# Patient Record
Sex: FEMALE | Race: WHITE | NOT HISPANIC OR LATINO | Employment: FULL TIME | URBAN - METROPOLITAN AREA
[De-identification: names, ages, dates, MRNs, and addresses within clinical notes are randomized per-mention and may not be internally consistent; named-entity substitution may affect disease eponyms.]

---

## 2023-04-25 LAB
ALANINE AMINOTRANSFERASE (SGPT) (U/L) IN SER/PLAS: 19 U/L (ref 7–45)
ALBUMIN (G/DL) IN SER/PLAS: 3.7 G/DL (ref 3.4–5)
ALKALINE PHOSPHATASE (U/L) IN SER/PLAS: 58 U/L (ref 33–136)
ANION GAP IN SER/PLAS: 17 MMOL/L (ref 10–20)
ASPARTATE AMINOTRANSFERASE (SGOT) (U/L) IN SER/PLAS: 19 U/L (ref 9–39)
BILIRUBIN TOTAL (MG/DL) IN SER/PLAS: 0.3 MG/DL (ref 0–1.2)
C REACTIVE PROTEIN (MG/L) IN SER/PLAS: 0.78 MG/DL
CALCIUM (MG/DL) IN SER/PLAS: 9.3 MG/DL (ref 8.6–10.3)
CARBON DIOXIDE, TOTAL (MMOL/L) IN SER/PLAS: 21 MMOL/L (ref 21–32)
CHLORIDE (MMOL/L) IN SER/PLAS: 104 MMOL/L (ref 98–107)
CREATININE (MG/DL) IN SER/PLAS: 0.61 MG/DL (ref 0.5–1.05)
ERYTHROCYTE DISTRIBUTION WIDTH (RATIO) BY AUTOMATED COUNT: 12.4 % (ref 11.5–14.5)
ERYTHROCYTE MEAN CORPUSCULAR HEMOGLOBIN CONCENTRATION (G/DL) BY AUTOMATED: 31.8 G/DL (ref 32–36)
ERYTHROCYTE MEAN CORPUSCULAR VOLUME (FL) BY AUTOMATED COUNT: 90 FL (ref 80–100)
ERYTHROCYTES (10*6/UL) IN BLOOD BY AUTOMATED COUNT: 4.48 X10E12/L (ref 4–5.2)
GFR FEMALE: >90 ML/MIN/1.73M2
GLUCOSE (MG/DL) IN SER/PLAS: 70 MG/DL (ref 74–99)
HEMATOCRIT (%) IN BLOOD BY AUTOMATED COUNT: 40.3 % (ref 36–46)
HEMOGLOBIN (G/DL) IN BLOOD: 12.8 G/DL (ref 12–16)
LEUKOCYTES (10*3/UL) IN BLOOD BY AUTOMATED COUNT: 5.3 X10E9/L (ref 4.4–11.3)
PLATELETS (10*3/UL) IN BLOOD AUTOMATED COUNT: 231 X10E9/L (ref 150–450)
POTASSIUM (MMOL/L) IN SER/PLAS: 4.5 MMOL/L (ref 3.5–5.3)
PROTEIN TOTAL: 6.4 G/DL (ref 6.4–8.2)
SEDIMENTATION RATE, ERYTHROCYTE: 44 MM/H (ref 0–30)
SODIUM (MMOL/L) IN SER/PLAS: 137 MMOL/L (ref 136–145)
UREA NITROGEN (MG/DL) IN SER/PLAS: 20 MG/DL (ref 6–23)

## 2023-11-02 DIAGNOSIS — I10 ESSENTIAL HYPERTENSION: ICD-10-CM

## 2023-11-02 RX ORDER — METOPROLOL SUCCINATE 50 MG/1
50 TABLET, EXTENDED RELEASE ORAL 2 TIMES DAILY
COMMUNITY
End: 2023-11-02 | Stop reason: SDUPTHER

## 2023-11-03 RX ORDER — METOPROLOL SUCCINATE 50 MG/1
50 TABLET, EXTENDED RELEASE ORAL 2 TIMES DAILY
Qty: 180 TABLET | Refills: 3 | Status: SHIPPED | OUTPATIENT
Start: 2023-11-03 | End: 2024-01-29 | Stop reason: WASHOUT

## 2023-12-05 ENCOUNTER — OFFICE VISIT (OUTPATIENT)
Dept: RHEUMATOLOGY | Facility: CLINIC | Age: 63
End: 2023-12-05
Payer: COMMERCIAL

## 2023-12-05 VITALS
DIASTOLIC BLOOD PRESSURE: 60 MMHG | WEIGHT: 102 LBS | SYSTOLIC BLOOD PRESSURE: 101 MMHG | HEIGHT: 66 IN | BODY MASS INDEX: 16.39 KG/M2

## 2023-12-05 DIAGNOSIS — M06.9 RHEUMATOID ARTHRITIS, INVOLVING UNSPECIFIED SITE, UNSPECIFIED WHETHER RHEUMATOID FACTOR PRESENT (MULTI): Primary | ICD-10-CM

## 2023-12-05 DIAGNOSIS — Z79.899 ENCOUNTER FOR LONG-TERM (CURRENT) USE OF HIGH-RISK MEDICATION: ICD-10-CM

## 2023-12-05 PROCEDURE — 99214 OFFICE O/P EST MOD 30 MIN: CPT | Performed by: INTERNAL MEDICINE

## 2023-12-05 PROCEDURE — 3078F DIAST BP <80 MM HG: CPT | Performed by: INTERNAL MEDICINE

## 2023-12-05 PROCEDURE — 1036F TOBACCO NON-USER: CPT | Performed by: INTERNAL MEDICINE

## 2023-12-05 PROCEDURE — 3074F SYST BP LT 130 MM HG: CPT | Performed by: INTERNAL MEDICINE

## 2023-12-05 RX ORDER — ABATACEPT 125 MG/ML
125 INJECTION, SOLUTION SUBCUTANEOUS
COMMUNITY
End: 2024-01-12 | Stop reason: HOSPADM

## 2023-12-05 RX ORDER — ACETAMINOPHEN, ASPIRIN (NSAID), AND CAFFEINE 250; 250; 65 MG/1; MG/1; MG/1
2 TABLET, FILM COATED ORAL EVERY 6 HOURS PRN
COMMUNITY

## 2023-12-05 ASSESSMENT — ENCOUNTER SYMPTOMS
PALPITATIONS: 1
DIZZINESS: 1
ROS GI COMMENTS: COLOSTOMY
SHORTNESS OF BREATH: 0
ABDOMINAL PAIN: 0

## 2023-12-05 NOTE — PROGRESS NOTES
Subjective   Patient ID: Maggie Carrington is a 63 y.o. female who presents for Rheumatoid Arthritis (Follow up ).  HPI  Patient with history of rheumatoid arthritis with rheumatoid nodules.  Previous medications have included methotrexate, gold, leflunomide, Xeljanz.  Patient with history of diverticulosis with history of colostomy and cardiac arrest.  Has had frequent infections on medication such as Xeljanz    At her last visit on 4/25/2023, we had her continue with Orencia injections.  Unfortunately she said she had difficulty with the co-pay assistance and they are charging her full price.  She does restarted again about 2 weeks ago and had been off for about 3 months.  Everything has been hurting but especially her right knee and hip.  She has been following with [facility] orth[facility] and did have injections in her right knee and hip with steroid and hyaluronic acid.    Denies any falls.  Denies any fractures.    She said her stress test was indicative of CAD.  Continues to be on metoprolol and gets occasional palpitations.  Gets occasional dizziness.    If she uses her walker too much her hands and shoulders will hurt her.  She is unable to ambulate without it.  Review of Systems   Respiratory:  Negative for shortness of breath.    Cardiovascular:  Positive for palpitations. Negative for leg swelling.   Gastrointestinal:  Negative for abdominal pain.        Colostomy   Neurological:  Positive for dizziness.       Objective   Physical Exam  GEN: NAD A&O x3 appropriate affect examined in her chair.  Uses a wheeled walker.  HENT: no enlarged glands or thyroid  EYES: +conjunctival redness, eyelids normal  SKIN: No rashes seen on exposed skin  MSK:  Unable to extend her fingers has had chronic changes bilaterally and surgical procedures in her MCPs.  Has swelling in the left breast more than the right no swelling elbows or shoulders decreased muscle tone in the upper extremities has swelling in the knees more on the  right than the left and pain with range of motion especially of the right hip.    Assessment/Plan        Rheumatoid arthritis with nodules.  Previously has been on methotrexate, gold, NSAIDs, leflunomide, Enbrel, Xeljanz.    -Currently on Orencia but has just been on for the last few weeks due to co-pay assistance problems.  Told her to call us if she has any issues in the future.  We will have her do blood work.  She does have some active swelling in her wrist today and also pain in the right knee and hip which most likely is from her rheumatoid.  Has been seeing [facility] and has had steroid and hyaluronic acid injections.      We will have her come back again in 6 months or as needed.    Patient is asking for prescription for wheelchair.

## 2023-12-25 DIAGNOSIS — M06.9 RHEUMATOID ARTHRITIS, INVOLVING UNSPECIFIED SITE, UNSPECIFIED WHETHER RHEUMATOID FACTOR PRESENT (MULTI): Primary | ICD-10-CM

## 2023-12-25 RX ORDER — ABATACEPT 125 MG/ML
125 INJECTION, SOLUTION SUBCUTANEOUS
Qty: 4 ML | Refills: 5 | Status: SHIPPED | OUTPATIENT
Start: 2023-12-25 | End: 2024-06-22

## 2024-01-05 ENCOUNTER — HOSPITAL ENCOUNTER (INPATIENT)
Facility: HOSPITAL | Age: 64
LOS: 3 days | Discharge: ANOTHER HOSPITAL | DRG: 281 | End: 2024-01-10
Attending: PHYSICIAN ASSISTANT | Admitting: INTERNAL MEDICINE
Payer: COMMERCIAL

## 2024-01-05 ENCOUNTER — APPOINTMENT (OUTPATIENT)
Dept: CARDIOLOGY | Facility: HOSPITAL | Age: 64
DRG: 281 | End: 2024-01-05
Payer: COMMERCIAL

## 2024-01-05 ENCOUNTER — HOSPITAL ENCOUNTER (INPATIENT)
Facility: HOSPITAL | Age: 64
End: 2024-01-05
Attending: FAMILY MEDICINE | Admitting: FAMILY MEDICINE
Payer: COMMERCIAL

## 2024-01-05 ENCOUNTER — APPOINTMENT (OUTPATIENT)
Dept: RADIOLOGY | Facility: HOSPITAL | Age: 64
DRG: 281 | End: 2024-01-05
Payer: COMMERCIAL

## 2024-01-05 DIAGNOSIS — I21.4 NSTEMI (NON-ST ELEVATED MYOCARDIAL INFARCTION) (MULTI): Primary | ICD-10-CM

## 2024-01-05 DIAGNOSIS — K04.7 DENTAL ABSCESS: ICD-10-CM

## 2024-01-05 DIAGNOSIS — R55 SYNCOPE AND COLLAPSE: ICD-10-CM

## 2024-01-05 LAB
ALBUMIN SERPL BCP-MCNC: 3.4 G/DL (ref 3.4–5)
ALP SERPL-CCNC: 58 U/L (ref 33–136)
ALT SERPL W P-5'-P-CCNC: 16 U/L (ref 7–45)
ANION GAP SERPL CALC-SCNC: 18 MMOL/L (ref 10–20)
ANION GAP SERPL CALC-SCNC: 19 MMOL/L (ref 10–20)
APTT PPP: 27 SECONDS (ref 27–38)
AST SERPL W P-5'-P-CCNC: 22 U/L (ref 9–39)
BILIRUB SERPL-MCNC: 0.4 MG/DL (ref 0–1.2)
BUN SERPL-MCNC: 11 MG/DL (ref 6–23)
BUN SERPL-MCNC: 11 MG/DL (ref 6–23)
CALCIUM SERPL-MCNC: 8.4 MG/DL (ref 8.6–10.3)
CALCIUM SERPL-MCNC: 8.4 MG/DL (ref 8.6–10.3)
CARDIAC TROPONIN I PNL SERPL HS: 127 NG/L (ref 0–13)
CARDIAC TROPONIN I PNL SERPL HS: 170 NG/L (ref 0–13)
CARDIAC TROPONIN I PNL SERPL HS: 94 NG/L (ref 0–13)
CHLORIDE SERPL-SCNC: 105 MMOL/L (ref 98–107)
CHLORIDE SERPL-SCNC: 105 MMOL/L (ref 98–107)
CO2 SERPL-SCNC: 20 MMOL/L (ref 21–32)
CO2 SERPL-SCNC: 21 MMOL/L (ref 21–32)
CREAT SERPL-MCNC: 0.61 MG/DL (ref 0.5–1.05)
CREAT SERPL-MCNC: 0.63 MG/DL (ref 0.5–1.05)
ERYTHROCYTE [DISTWIDTH] IN BLOOD BY AUTOMATED COUNT: 12.4 % (ref 11.5–14.5)
FLUAV RNA RESP QL NAA+PROBE: NOT DETECTED
FLUBV RNA RESP QL NAA+PROBE: NOT DETECTED
GFR SERPL CREATININE-BSD FRML MDRD: >90 ML/MIN/1.73M*2
GFR SERPL CREATININE-BSD FRML MDRD: >90 ML/MIN/1.73M*2
GLUCOSE SERPL-MCNC: 106 MG/DL (ref 74–99)
GLUCOSE SERPL-MCNC: 108 MG/DL (ref 74–99)
HCT VFR BLD AUTO: 39.1 % (ref 36–46)
HGB BLD-MCNC: 11.7 G/DL (ref 12–16)
LACTATE SERPL-SCNC: 1.8 MMOL/L (ref 0.4–2)
MAGNESIUM SERPL-MCNC: 1.51 MG/DL (ref 1.6–2.4)
MCH RBC QN AUTO: 27.2 PG (ref 26–34)
MCHC RBC AUTO-ENTMCNC: 29.9 G/DL (ref 32–36)
MCV RBC AUTO: 91 FL (ref 80–100)
NRBC BLD-RTO: 0 /100 WBCS (ref 0–0)
PLATELET # BLD AUTO: 171 X10*3/UL (ref 150–450)
POTASSIUM SERPL-SCNC: 3.5 MMOL/L (ref 3.5–5.3)
POTASSIUM SERPL-SCNC: 3.5 MMOL/L (ref 3.5–5.3)
PROT SERPL-MCNC: 6.4 G/DL (ref 6.4–8.2)
RBC # BLD AUTO: 4.3 X10*6/UL (ref 4–5.2)
SARS-COV-2 RNA RESP QL NAA+PROBE: NOT DETECTED
SODIUM SERPL-SCNC: 140 MMOL/L (ref 136–145)
SODIUM SERPL-SCNC: 140 MMOL/L (ref 136–145)
UFH PPP CHRO-ACNC: 0.7 IU/ML
WBC # BLD AUTO: 4.3 X10*3/UL (ref 4.4–11.3)

## 2024-01-05 PROCEDURE — 87636 SARSCOV2 & INF A&B AMP PRB: CPT | Performed by: PHYSICIAN ASSISTANT

## 2024-01-05 PROCEDURE — 93005 ELECTROCARDIOGRAM TRACING: CPT

## 2024-01-05 PROCEDURE — 70450 CT HEAD/BRAIN W/O DYE: CPT

## 2024-01-05 PROCEDURE — 36415 COLL VENOUS BLD VENIPUNCTURE: CPT | Performed by: PHYSICIAN ASSISTANT

## 2024-01-05 PROCEDURE — 73030 X-RAY EXAM OF SHOULDER: CPT | Mod: LT

## 2024-01-05 PROCEDURE — 70486 CT MAXILLOFACIAL W/O DYE: CPT | Performed by: RADIOLOGY

## 2024-01-05 PROCEDURE — 76377 3D RENDER W/INTRP POSTPROCES: CPT | Mod: IPSPLIT

## 2024-01-05 PROCEDURE — 72125 CT NECK SPINE W/O DYE: CPT

## 2024-01-05 PROCEDURE — 99285 EMERGENCY DEPT VISIT HI MDM: CPT | Performed by: PHYSICIAN ASSISTANT

## 2024-01-05 PROCEDURE — 2500000001 HC RX 250 WO HCPCS SELF ADMINISTERED DRUGS (ALT 637 FOR MEDICARE OP): Performed by: PHYSICIAN ASSISTANT

## 2024-01-05 PROCEDURE — 80053 COMPREHEN METABOLIC PANEL: CPT | Performed by: PHYSICIAN ASSISTANT

## 2024-01-05 PROCEDURE — 84484 ASSAY OF TROPONIN QUANT: CPT | Performed by: PHYSICIAN ASSISTANT

## 2024-01-05 PROCEDURE — 85730 THROMBOPLASTIN TIME PARTIAL: CPT | Performed by: PHYSICIAN ASSISTANT

## 2024-01-05 PROCEDURE — 71045 X-RAY EXAM CHEST 1 VIEW: CPT | Performed by: STUDENT IN AN ORGANIZED HEALTH CARE EDUCATION/TRAINING PROGRAM

## 2024-01-05 PROCEDURE — 72125 CT NECK SPINE W/O DYE: CPT | Performed by: RADIOLOGY

## 2024-01-05 PROCEDURE — 71045 X-RAY EXAM CHEST 1 VIEW: CPT

## 2024-01-05 PROCEDURE — 70486 CT MAXILLOFACIAL W/O DYE: CPT

## 2024-01-05 PROCEDURE — 85027 COMPLETE CBC AUTOMATED: CPT | Performed by: PHYSICIAN ASSISTANT

## 2024-01-05 PROCEDURE — 70450 CT HEAD/BRAIN W/O DYE: CPT | Performed by: RADIOLOGY

## 2024-01-05 PROCEDURE — 83605 ASSAY OF LACTIC ACID: CPT | Performed by: PHYSICIAN ASSISTANT

## 2024-01-05 PROCEDURE — 2500000004 HC RX 250 GENERAL PHARMACY W/ HCPCS (ALT 636 FOR OP/ED): Performed by: PHYSICIAN ASSISTANT

## 2024-01-05 PROCEDURE — 73030 X-RAY EXAM OF SHOULDER: CPT | Mod: LEFT SIDE | Performed by: RADIOLOGY

## 2024-01-05 PROCEDURE — 76377 3D RENDER W/INTRP POSTPROCES: CPT | Performed by: RADIOLOGY

## 2024-01-05 PROCEDURE — 80048 BASIC METABOLIC PNL TOTAL CA: CPT | Mod: CCI | Performed by: PHYSICIAN ASSISTANT

## 2024-01-05 PROCEDURE — 85520 HEPARIN ASSAY: CPT | Performed by: PHYSICIAN ASSISTANT

## 2024-01-05 PROCEDURE — 83735 ASSAY OF MAGNESIUM: CPT | Performed by: PHYSICIAN ASSISTANT

## 2024-01-05 RX ORDER — ACETAMINOPHEN 325 MG/1
650 TABLET ORAL ONCE
Status: COMPLETED | OUTPATIENT
Start: 2024-01-05 | End: 2024-01-05

## 2024-01-05 RX ORDER — HEPARIN SODIUM 5000 [USP'U]/ML
60 INJECTION, SOLUTION INTRAVENOUS; SUBCUTANEOUS ONCE
Status: COMPLETED | OUTPATIENT
Start: 2024-01-05 | End: 2024-01-05

## 2024-01-05 RX ORDER — LANOLIN ALCOHOL/MO/W.PET/CERES
400 CREAM (GRAM) TOPICAL ONCE
Status: COMPLETED | OUTPATIENT
Start: 2024-01-05 | End: 2024-01-05

## 2024-01-05 RX ORDER — CEFTRIAXONE 1 G/50ML
1 INJECTION, SOLUTION INTRAVENOUS ONCE
Status: COMPLETED | OUTPATIENT
Start: 2024-01-05 | End: 2024-01-05

## 2024-01-05 RX ORDER — NAPROXEN SODIUM 220 MG/1
81 TABLET, FILM COATED ORAL ONCE
Status: COMPLETED | OUTPATIENT
Start: 2024-01-05 | End: 2024-01-05

## 2024-01-05 RX ORDER — HEPARIN SODIUM 5000 [USP'U]/ML
INJECTION, SOLUTION INTRAVENOUS; SUBCUTANEOUS EVERY 4 HOURS PRN
Status: DISCONTINUED | OUTPATIENT
Start: 2024-01-05 | End: 2024-01-07

## 2024-01-05 RX ORDER — METOPROLOL SUCCINATE 25 MG/1
50 TABLET, EXTENDED RELEASE ORAL DAILY
Status: DISCONTINUED | OUTPATIENT
Start: 2024-01-06 | End: 2024-01-06

## 2024-01-05 RX ORDER — HEPARIN SODIUM 10000 [USP'U]/100ML
0-4000 INJECTION, SOLUTION INTRAVENOUS CONTINUOUS
Status: DISCONTINUED | OUTPATIENT
Start: 2024-01-05 | End: 2024-01-07

## 2024-01-05 RX ORDER — CLINDAMYCIN PHOSPHATE 300 MG/50ML
300 INJECTION, SOLUTION INTRAVENOUS EVERY 8 HOURS
Status: DISCONTINUED | OUTPATIENT
Start: 2024-01-05 | End: 2024-01-06

## 2024-01-05 RX ADMIN — HEPARIN SODIUM 1200 UNITS/HR: 10000 INJECTION, SOLUTION INTRAVENOUS at 14:21

## 2024-01-05 RX ADMIN — SODIUM CHLORIDE 1000 ML: 0.9 INJECTION, SOLUTION INTRAVENOUS at 12:14

## 2024-01-05 RX ADMIN — Medication 400 MG: at 15:58

## 2024-01-05 RX ADMIN — HEPARIN SODIUM 2650 UNITS: 5000 INJECTION, SOLUTION INTRAVENOUS; SUBCUTANEOUS at 14:22

## 2024-01-05 RX ADMIN — CLINDAMYCIN IN 5 PERCENT DEXTROSE 300 MG: 6 INJECTION, SOLUTION INTRAVENOUS at 20:15

## 2024-01-05 RX ADMIN — ACETAMINOPHEN 650 MG: 325 TABLET ORAL at 18:31

## 2024-01-05 RX ADMIN — ASPIRIN 81 MG CHEWABLE TABLET 81 MG: 81 TABLET CHEWABLE at 20:53

## 2024-01-05 RX ADMIN — CEFTRIAXONE 1 G: 1 INJECTION, SOLUTION INTRAVENOUS at 12:18

## 2024-01-05 ASSESSMENT — PAIN - FUNCTIONAL ASSESSMENT
PAIN_FUNCTIONAL_ASSESSMENT: 0-10
PAIN_FUNCTIONAL_ASSESSMENT: 0-10

## 2024-01-05 ASSESSMENT — COLUMBIA-SUICIDE SEVERITY RATING SCALE - C-SSRS
2. HAVE YOU ACTUALLY HAD ANY THOUGHTS OF KILLING YOURSELF?: NO
6. HAVE YOU EVER DONE ANYTHING, STARTED TO DO ANYTHING, OR PREPARED TO DO ANYTHING TO END YOUR LIFE?: NO
1. IN THE PAST MONTH, HAVE YOU WISHED YOU WERE DEAD OR WISHED YOU COULD GO TO SLEEP AND NOT WAKE UP?: NO

## 2024-01-05 ASSESSMENT — PAIN SCALES - GENERAL: PAINLEVEL_OUTOF10: 0 - NO PAIN

## 2024-01-05 ASSESSMENT — PAIN DESCRIPTION - LOCATION: LOCATION: BACK

## 2024-01-05 NOTE — ED PROVIDER NOTES
HPI   Chief Complaint   Patient presents with    Syncope     Pt. Being treated with amoxicillen since 1/2/24 for a tooth infection, has been vomiting every time she takes the medication.  Was walking to the bathroom when her legs became shakey, she got dizzy and then she woke up on the floor and called 911.  Pt. Denies any injury from the fall.       63-year-old female with past medical history positive for anxiety disorder , rheumatoid arthritis diverticulitis with bowel obstruction requiring colostomy had a right lower dental infection had been on amoxicillin but the medication was causing nausea and vomiting and then today she developed increased lightheadedness and dizziness and had a syncopal episode this morning    She has been feeling feverish/chills for the last 2 days  She does have some slight right lower facial swelling but patient states this has been present since she was diagnosed with a tooth infection by her dentist    No chest pain no shortness of breath has had chills headache    Does have some left shoulder pain but states pain with movement or palp                          No data recorded                Patient History   Past Medical History:   Diagnosis Date    Anxiety disorder, unspecified     Anxiety    Encounter for screening for other viral diseases 02/08/2016    Need for hepatitis B screening test    Muscle weakness (generalized)     Muscle weakness    Other conditions influencing health status     Localized Joint Stiffness Occurring In More Than One Joint    Other conditions influencing health status 11/06/2015    History of cough    Pain in unspecified ankle and joints of unspecified foot 07/08/2014    Ankle joint pain    Pain in unspecified joint     Multiple joint pain    Personal history of other specified conditions     History of headache    Personal history of other specified conditions     History of fatigue    Pneumonia, unspecified organism 11/10/2015    Community acquired  pneumonia    Pneumonia, unspecified organism 11/06/2015    Community acquired pneumonia     Past Surgical History:   Procedure Laterality Date    CT GUIDED PERCUTANEOUS PERITONEAL OR RETROPERITONEAL FLUID COLLECTION DRAINAGE  1/31/2022    CT GUIDED PERCUTANEOUS PERITONEAL OR RETROPERITONEAL FLUID COLLECTION DRAINAGE [facility]    CT GUIDED PERCUTANEOUS PERITONEAL OR RETROPERITONEAL FLUID COLLECTION DRAINAGE  8/10/2022    CT GUIDED PERCUTANEOUS PERITONEAL OR RETROPERITONEAL FLUID COLLECTION DRAINAGE [facility]     No family history on file.  Social History     Tobacco Use    Smoking status: Never     Passive exposure: Never    Smokeless tobacco: Never   Vaping Use    Vaping Use: Never used   Substance Use Topics    Alcohol use: Never    Drug use: Never       Physical Exam   ED Triage Vitals [01/05/24 1105]   Temp Heart Rate Resp BP   36.1 °C (97 °F) 110 16 106/66      SpO2 Temp Source Heart Rate Source Patient Position   -- Temporal Monitor Lying      BP Location FiO2 (%)     Left arm --       Physical Exam  Vitals and nursing note reviewed.   Constitutional:       General: She is not in acute distress.     Appearance: She is well-developed.   HENT:      Head: Normocephalic and atraumatic.      Right Ear: Tympanic membrane, ear canal and external ear normal.      Left Ear: Tympanic membrane, ear canal and external ear normal.      Nose: Congestion present.      Mouth/Throat:      Mouth: Mucous membranes are dry.      Comments: Right lower jaw slight swelling no erythema  Does show dental caries to 2 teeth along the right lower mouth  Eyes:      Conjunctiva/sclera: Conjunctivae normal.      Pupils: Pupils are equal, round, and reactive to light.   Cardiovascular:      Rate and Rhythm: Regular rhythm. Tachycardia present.      Heart sounds: No murmur heard.  Pulmonary:      Effort: Pulmonary effort is normal. No respiratory distress.      Breath sounds: Normal breath sounds.   Abdominal:       Palpations: Abdomen is soft.      Tenderness: There is no abdominal tenderness.   Musculoskeletal:         General: No swelling.      Cervical back: Neck supple.   Skin:     General: Skin is warm and dry.      Capillary Refill: Capillary refill takes less than 2 seconds.   Neurological:      General: No focal deficit present.      Mental Status: She is alert.   Psychiatric:         Mood and Affect: Mood normal.         ED Course & MDM   Diagnoses as of 01/05/24 2003   NSTEMI (non-ST elevated myocardial infarction) (CMS/[facility])   Dental abscess   Syncope and collapse       Medical Decision Making  Patient here for eval of a syncopal episode had been on amoxicillin for right-sided dental infection was making her sick to the stomach and she was having nausea and vomiting episodes, then over the last 2 days increasing weakness she went to get up to go to the bathroom today got lightheaded and dizzy and had syncopal episode  Prior to going to the restroom    Came in here for eval further  Patient denied any chest pain    On exam there is slight swelling to the right side of the face  Cardiac testing CT of the head cervical spine and maxillofacial x-ray of the chest and left shoulder obtained right side of the lower face/mandible with small apical abscess    Patient also complains of left shoulder pain status post fall x-ray of the left shoulder showed no acute fracture or abnormality    EKGs had some inverted T waves but nonspecific and no ST elevation  Patient's troponins initial was at 90, 120s, and last was in the 170s    Diagnosis NSTEMI syncope and dental abscess  She was started on heparin drip given IV antibiotics for dental abscess  Because of her troponins going up she was transferred to [facility]  Accepting doctor is Dr. Gonzales the hospitalist    Maxillofacial and cardiology are on also    Labs Reviewed  CBC - Abnormal     WBC                           4.3 (*)                nRBC                          0.0                     RBC                           4.30                   Hemoglobin                    11.7 (*)               Hematocrit                    39.1                   MCV                           91                     MCH                           27.2                   MCHC                          29.9 (*)               RDW                           12.4                   Platelets                     171                 BASIC METABOLIC PANEL - Abnormal     Glucose                       106 (*)                Sodium                        140                    Potassium                     3.5                    Chloride                      105                    Bicarbonate                   21                     Anion Gap                     18                     Urea Nitrogen                 11                     Creatinine                    0.61                   eGFR                          >90                    Calcium                       8.4 (*)             COMPREHENSIVE METABOLIC PANEL - Abnormal     Glucose                       108 (*)                Sodium                        140                    Potassium                     3.5                    Chloride                      105                    Bicarbonate                   20 (*)                 Anion Gap                     19                     Urea Nitrogen                 11                     Creatinine                    0.63                   eGFR                          >90                    Calcium                       8.4 (*)                Albumin                       3.4                    Alkaline Phosphatase          58                     Total Protein                 6.4                    AST                           22                     Bilirubin, Total              0.4                    ALT                           16                  TROPONIN I, HIGH SENSITIVITY - Abnormal     Troponin I, High  Sensitivity   94 (*)                     Narrative: Less than 99th percentile of normal range cutoff-                  Female and children under 18 years old <14 ng/L; Male <21 ng/L: Negative                  Repeat testing should be performed if clinically indicated.                                     Female and children under 18 years old 14-50 ng/L; Male 21-50 ng/L:                  Consistent with possible cardiac damage and possible increased clinical                   risk. Serial measurements may help to assess extent of myocardial damage.                                     >50 ng/L: Consistent with cardiac damage, increased clinical risk and                  myocardial infarction. Serial measurements may help assess extent of                   myocardial damage.                                      NOTE: Children less than 1 year old may have higher baseline troponin                   levels and results should be interpreted in conjunction with the overall                   clinical context.                                     NOTE: Troponin I testing is performed using a different                   testing methodology at [facility] than at other                   [facility]. Direct result comparisons should only                   be made within the same method.  TROPONIN I, HIGH SENSITIVITY - Abnormal     Troponin I, High Sensitivity   127 (*)                    Narrative: Less than 99th percentile of normal range cutoff-                  Female and children under 18 years old <14 ng/L; Male <21 ng/L: Negative                  Repeat testing should be performed if clinically indicated.                                     Female and children under 18 years old 14-50 ng/L; Male 21-50 ng/L:                  Consistent with possible cardiac damage and possible increased clinical                   risk. Serial measurements may help to assess extent of myocardial damage.                                      >50 ng/L: Consistent with cardiac damage, increased clinical risk and                  myocardial infarction. Serial measurements may help assess extent of                   myocardial damage.                                      NOTE: Children less than 1 year old may have higher baseline troponin                   levels and results should be interpreted in conjunction with the overall                   clinical context.                                     NOTE: Troponin I testing is performed using a different                   testing methodology at [facility] than at other                   [facility]. Direct result comparisons should only                   be made within the same method.  MAGNESIUM - Abnormal     Magnesium                     1.51 (*)            TROPONIN I, HIGH SENSITIVITY - Abnormal     Troponin I, High Sensitivity   170 (*)                    Narrative: Less than 99th percentile of normal range cutoff-                  Female and children under 18 years old <14 ng/L; Male <21 ng/L: Negative                  Repeat testing should be performed if clinically indicated.                                     Female and children under 18 years old 14-50 ng/L; Male 21-50 ng/L:                  Consistent with possible cardiac damage and possible increased clinical                   risk. Serial measurements may help to assess extent of myocardial damage.                                     >50 ng/L: Consistent with cardiac damage, increased clinical risk and                  myocardial infarction. Serial measurements may help assess extent of                   myocardial damage.                                      NOTE: Children less than 1 year old may have higher baseline troponin                   levels and results should be interpreted in conjunction with the overall                   clinical context.                                     NOTE: Troponin I  testing is performed using a different                   testing methodology at [facility] than at other                   [facility]. Direct result comparisons should only                   be made within the same method.  LACTATE - Normal     Lactate                       1.8                        Narrative: Venipuncture immediately after or during the administration of Metamizole may lead to falsely low results. Testing should be performed immediately                  prior to Metamizole dosing.  SARS-COV-2 AND INFLUENZA A/B PCR - Normal     Flu A Result                                         Flu B Result                                         Coronavirus 2019, PCR                                    Narrative: This assay has received FDA Emergency Use Authorization (EUA) and  is only authorized for the duration of time that circumstances exist to justify the authorization of the emergency use of in vitro diagnostic tests for the detection of SARS-CoV-2 virus and/or diagnosis of COVID-19 infection under section 564(b)(1) of the Act, 21 U.S.C. 360bbb-3(b)(1). Testing for SARS-CoV-2 is only recommended for patients who meet current clinical and/or epidemiological criteria as defined by federal, state, or local public health directives. This assay is an in vitro diagnostic nucleic acid amplification test for the qualitative detection of SARS-CoV-2, Influenza A, and Influenza B from nasopharyngeal specimens and has been validated for use at [facility]. Negative results do not preclude COVID-19 infections or Influenza A/B infections, and should not be used as the sole basis for diagnosis, treatment, or other management decisions. If Influenza A/B and RSV PCR results are negative, testing for Parainfluenza virus, Adenovirus and Metapneumovirus is routinely performed for [facility] pediatric oncology and intensive care inpatients, and is available on other patients by placing an  add-on request.   APTT - Normal     aPTT                          27                         Narrative: The APTT is no longer used for monitoring Unfractionated Heparin Therapy. For monitoring Heparin Therapy, use the Heparin Assay.  HEPARIN ASSAY - Normal     Heparin Unfractionated        0.7                        Narrative: The therapeutic reference range for UFH may be either 0.3-0.6 IU/mL or 0.3-0.7 IU/mL based on the clinical setting for anticoagulant therapy and the associated nomogram used. For Heparin dosing guidelines based on clinical scenario and Heparin Assay results, please refer to local Pharmacy and the [facility] Guidelines for Anticoagulation Therapy available on the [facility] intranet at: [email or web address]  HEPARIN ASSAY      XR shoulder left 2+ views   Final Result    No acute findings.                MACRO:    None          Signed by: Maynor Bryson 1/5/2024 4:09 PM    Dictation workstation:   [id number]     CT head wo IV contrast   Final Result    No CT evidence of acute intracranial hemorrhage or mass effect.          Signed by: Armen Villareal 1/5/2024 12:16 PM    Dictation workstation:   [id number]     CT maxillofacial bones wo IV contrast   Final Result    Soft tissue swelling anterior and lateral to the right mandibular    body. There is no soft tissue air or localized fluid collection at    this site.          1.5 cm round periapical lucency at the left mandibular molar    compatible with periapical abscess.          Mild mucoperiosteal thickening at the floor of the left maxillary    sinus with possible trace fluid. The remaining paranasal sinuses and    mastoid air cells are clear and symmetrically aerated.          Slight bowing of the septum to the left. The ostiomeatal units remain    patent bilaterally.          Signed by: Armen Villareal 1/5/2024 12:26 PM    Dictation workstation:    [id number]     CT cervical spine wo IV contrast   Final Result    No sign of acute fracture or subluxation.          Dextro convexity of the cervical spine with multilevel hypertrophic    facet arthrosis on the right.          No central canal narrowing.          Biapical pleural and parenchymal scarring with nodular cavitary    apical opacities and bronchiectasis. This appearance has progressed    in the interval from 08/09/2022. The need for further workup should    be determined clinically.          Signed by: Armen Villareal 1/5/2024 12:20 PM    Dictation workstation:   [id number]     CT 3D reconstruction    (Results Pending)  XR chest 1 view    (Results Pending)    Patient has been accepted at [facility] to the hospitalist service      Amount and/or Complexity of Data Reviewed  ECG/medicine tests: independent interpretation performed.     Details: EKG done at 1252 shows sinus rhythm baseline artifact rate of 91 Axis normal inverted T waves V1   QRS 68 QT/QTc 350/435 no old to compare to but no acute ST elevation    Second EKG done at 1751 shows sinus rhythm rate of 91 Axis normal, inverted T waves V3  QRS 60 QT/QTc 348/428, no ST elevation  Discussion of management or test interpretation with external provider(s): Patient still in the ED 24 hours (still no bed available for transfer ) later her troponins have cycled down still no chest pain  I had reached out to cardiology on and patient is okay to be admitted here instead of being transferred    She will be admitted here at [facility]        Procedure  Procedures     Chaparrita Daniels, PA-C  01/05/24 1150       Chaparrita L Frances, PA-C  01/05/24 1334       Chaparrita L Frances, PA-C  01/05/24 1532       Chaparrita L Frances, PA-C  01/05/24 2009       Chaparrita L Page, PA-C  01/05/24 2208       Chaparrita L Page, PA-C  01/06/24 1152       Chaparrita L Page, PA-C  01/06/24 1207

## 2024-01-05 NOTE — ED TRIAGE NOTES
Pt. Being treated with amoxicillen for a tooth infection since 1/2/24, had been vomiting since taking the medications, was walking to the bathroom this morning when her legs became shakey, she became dizzy and then she said she woke up on the floor.

## 2024-01-06 ENCOUNTER — APPOINTMENT (OUTPATIENT)
Dept: RADIOLOGY | Facility: HOSPITAL | Age: 64
DRG: 281 | End: 2024-01-06
Payer: COMMERCIAL

## 2024-01-06 ENCOUNTER — HOSPITAL ENCOUNTER (INPATIENT)
Age: 64
End: 2024-01-06
Attending: FAMILY MEDICINE | Admitting: FAMILY MEDICINE
Payer: COMMERCIAL

## 2024-01-06 PROBLEM — R79.89 ELEVATED TROPONIN: Status: ACTIVE | Noted: 2024-01-06

## 2024-01-06 LAB
ALBUMIN SERPL BCP-MCNC: 3.1 G/DL (ref 3.4–5)
ALP SERPL-CCNC: 47 U/L (ref 33–136)
ALT SERPL W P-5'-P-CCNC: 13 U/L (ref 7–45)
ANION GAP SERPL CALC-SCNC: 13 MMOL/L (ref 10–20)
AST SERPL W P-5'-P-CCNC: 21 U/L (ref 9–39)
BILIRUB SERPL-MCNC: 0.3 MG/DL (ref 0–1.2)
BUN SERPL-MCNC: 9 MG/DL (ref 6–23)
CALCIUM SERPL-MCNC: 8.3 MG/DL (ref 8.6–10.3)
CARDIAC TROPONIN I PNL SERPL HS: 75 NG/L (ref 0–13)
CHLORIDE SERPL-SCNC: 106 MMOL/L (ref 98–107)
CO2 SERPL-SCNC: 25 MMOL/L (ref 21–32)
CREAT SERPL-MCNC: 0.48 MG/DL (ref 0.5–1.05)
D DIMER PPP FEU-MCNC: 5833 NG/ML FEU
GFR SERPL CREATININE-BSD FRML MDRD: >90 ML/MIN/1.73M*2
GLUCOSE SERPL-MCNC: 92 MG/DL (ref 74–99)
MAGNESIUM SERPL-MCNC: 1.59 MG/DL (ref 1.6–2.4)
POTASSIUM SERPL-SCNC: 3.6 MMOL/L (ref 3.5–5.3)
PROT SERPL-MCNC: 5.8 G/DL (ref 6.4–8.2)
SODIUM SERPL-SCNC: 140 MMOL/L (ref 136–145)
UFH PPP CHRO-ACNC: 0.2 IU/ML
UFH PPP CHRO-ACNC: 0.3 IU/ML
UFH PPP CHRO-ACNC: 0.7 IU/ML
UFH PPP CHRO-ACNC: 0.7 IU/ML
UFH PPP CHRO-ACNC: 0.8 IU/ML

## 2024-01-06 PROCEDURE — 71275 CT ANGIOGRAPHY CHEST: CPT

## 2024-01-06 PROCEDURE — 85027 COMPLETE CBC AUTOMATED: CPT

## 2024-01-06 PROCEDURE — 84484 ASSAY OF TROPONIN QUANT: CPT | Performed by: PHYSICIAN ASSISTANT

## 2024-01-06 PROCEDURE — 2500000001 HC RX 250 WO HCPCS SELF ADMINISTERED DRUGS (ALT 637 FOR MEDICARE OP)

## 2024-01-06 PROCEDURE — 71275 CT ANGIOGRAPHY CHEST: CPT | Performed by: RADIOLOGY

## 2024-01-06 PROCEDURE — 2500000004 HC RX 250 GENERAL PHARMACY W/ HCPCS (ALT 636 FOR OP/ED)

## 2024-01-06 PROCEDURE — 36415 COLL VENOUS BLD VENIPUNCTURE: CPT | Performed by: PHYSICIAN ASSISTANT

## 2024-01-06 PROCEDURE — G0378 HOSPITAL OBSERVATION PER HR: HCPCS

## 2024-01-06 PROCEDURE — 85520 HEPARIN ASSAY: CPT | Performed by: INTERNAL MEDICINE

## 2024-01-06 PROCEDURE — 80053 COMPREHEN METABOLIC PANEL: CPT | Performed by: PHYSICIAN ASSISTANT

## 2024-01-06 PROCEDURE — 73502 X-RAY EXAM HIP UNI 2-3 VIEWS: CPT | Mod: RT

## 2024-01-06 PROCEDURE — 85379 FIBRIN DEGRADATION QUANT: CPT

## 2024-01-06 PROCEDURE — 83735 ASSAY OF MAGNESIUM: CPT | Performed by: PHYSICIAN ASSISTANT

## 2024-01-06 PROCEDURE — 85520 HEPARIN ASSAY: CPT | Performed by: PHYSICIAN ASSISTANT

## 2024-01-06 PROCEDURE — 2500000004 HC RX 250 GENERAL PHARMACY W/ HCPCS (ALT 636 FOR OP/ED): Performed by: PHYSICIAN ASSISTANT

## 2024-01-06 PROCEDURE — 73502 X-RAY EXAM HIP UNI 2-3 VIEWS: CPT | Mod: RIGHT SIDE | Performed by: RADIOLOGY

## 2024-01-06 PROCEDURE — 2550000001 HC RX 255 CONTRASTS: Performed by: INTERNAL MEDICINE

## 2024-01-06 RX ORDER — ACETAMINOPHEN 160 MG/5ML
650 SOLUTION ORAL EVERY 4 HOURS PRN
Status: DISCONTINUED | OUTPATIENT
Start: 2024-01-06 | End: 2024-01-10 | Stop reason: HOSPADM

## 2024-01-06 RX ORDER — ACETAMINOPHEN 325 MG/1
650 TABLET ORAL ONCE
Status: COMPLETED | OUTPATIENT
Start: 2024-01-06 | End: 2024-01-06

## 2024-01-06 RX ORDER — METOPROLOL SUCCINATE 25 MG/1
50 TABLET, EXTENDED RELEASE ORAL 2 TIMES DAILY
Status: DISCONTINUED | OUTPATIENT
Start: 2024-01-06 | End: 2024-01-10 | Stop reason: HOSPADM

## 2024-01-06 RX ORDER — TALC
6 POWDER (GRAM) TOPICAL DAILY
Status: DISCONTINUED | OUTPATIENT
Start: 2024-01-06 | End: 2024-01-10 | Stop reason: HOSPADM

## 2024-01-06 RX ORDER — ACETAMINOPHEN 325 MG/1
TABLET ORAL
Status: COMPLETED
Start: 2024-01-06 | End: 2024-01-06

## 2024-01-06 RX ORDER — POLYETHYLENE GLYCOL 3350 17 G/17G
17 POWDER, FOR SOLUTION ORAL DAILY
Status: DISCONTINUED | OUTPATIENT
Start: 2024-01-06 | End: 2024-01-07

## 2024-01-06 RX ORDER — GUAIFENESIN 600 MG/1
600 TABLET, EXTENDED RELEASE ORAL EVERY 12 HOURS PRN
Status: DISCONTINUED | OUTPATIENT
Start: 2024-01-06 | End: 2024-01-10 | Stop reason: HOSPADM

## 2024-01-06 RX ORDER — ACETAMINOPHEN 325 MG/1
650 TABLET ORAL EVERY 4 HOURS PRN
Status: DISCONTINUED | OUTPATIENT
Start: 2024-01-06 | End: 2024-01-10 | Stop reason: HOSPADM

## 2024-01-06 RX ORDER — ACETAMINOPHEN 650 MG/1
650 SUPPOSITORY RECTAL EVERY 4 HOURS PRN
Status: DISCONTINUED | OUTPATIENT
Start: 2024-01-06 | End: 2024-01-10 | Stop reason: HOSPADM

## 2024-01-06 RX ORDER — ONDANSETRON 4 MG/1
4 TABLET, ORALLY DISINTEGRATING ORAL EVERY 8 HOURS PRN
Status: DISCONTINUED | OUTPATIENT
Start: 2024-01-06 | End: 2024-01-08

## 2024-01-06 RX ORDER — LANOLIN ALCOHOL/MO/W.PET/CERES
400 CREAM (GRAM) TOPICAL DAILY
Status: DISCONTINUED | OUTPATIENT
Start: 2024-01-06 | End: 2024-01-10 | Stop reason: HOSPADM

## 2024-01-06 RX ORDER — GUAIFENESIN/DEXTROMETHORPHAN 100-10MG/5
5 SYRUP ORAL EVERY 4 HOURS PRN
Status: DISCONTINUED | OUTPATIENT
Start: 2024-01-06 | End: 2024-01-10 | Stop reason: HOSPADM

## 2024-01-06 RX ORDER — SODIUM CHLORIDE 9 MG/ML
INJECTION, SOLUTION INTRAVENOUS
Status: COMPLETED
Start: 2024-01-06 | End: 2024-01-07

## 2024-01-06 RX ORDER — AMPICILLIN AND SULBACTAM 2; 1 G/1; G/1
INJECTION, POWDER, FOR SOLUTION INTRAMUSCULAR; INTRAVENOUS
Status: COMPLETED
Start: 2024-01-06 | End: 2024-01-06

## 2024-01-06 RX ORDER — ONDANSETRON HYDROCHLORIDE 2 MG/ML
4 INJECTION, SOLUTION INTRAVENOUS EVERY 8 HOURS PRN
Status: DISCONTINUED | OUTPATIENT
Start: 2024-01-06 | End: 2024-01-08

## 2024-01-06 RX ORDER — SODIUM CHLORIDE 9 MG/ML
INJECTION, SOLUTION INTRAVENOUS
Status: COMPLETED
Start: 2024-01-06 | End: 2024-01-06

## 2024-01-06 RX ORDER — PANTOPRAZOLE SODIUM 40 MG/10ML
40 INJECTION, POWDER, LYOPHILIZED, FOR SOLUTION INTRAVENOUS
Status: DISCONTINUED | OUTPATIENT
Start: 2024-01-07 | End: 2024-01-10 | Stop reason: HOSPADM

## 2024-01-06 RX ORDER — PANTOPRAZOLE SODIUM 40 MG/1
40 TABLET, DELAYED RELEASE ORAL
Status: DISCONTINUED | OUTPATIENT
Start: 2024-01-07 | End: 2024-01-10 | Stop reason: HOSPADM

## 2024-01-06 RX ADMIN — SODIUM CHLORIDE 250 ML: 9 INJECTION, SOLUTION INTRAVENOUS at 15:53

## 2024-01-06 RX ADMIN — ACETAMINOPHEN 650 MG: 325 TABLET ORAL at 21:43

## 2024-01-06 RX ADMIN — METOPROLOL SUCCINATE 50 MG: 25 TABLET, EXTENDED RELEASE ORAL at 21:44

## 2024-01-06 RX ADMIN — ACETAMINOPHEN 650 MG: 325 TABLET ORAL at 02:00

## 2024-01-06 RX ADMIN — CLINDAMYCIN IN 5 PERCENT DEXTROSE 300 MG: 6 INJECTION, SOLUTION INTRAVENOUS at 04:15

## 2024-01-06 RX ADMIN — AMPICILLIN SODIUM AND SULBACTAM SODIUM 3 G: 2; 1 INJECTION, POWDER, FOR SOLUTION INTRAMUSCULAR; INTRAVENOUS at 21:00

## 2024-01-06 RX ADMIN — HEPARIN SODIUM 900 UNITS/HR: 10000 INJECTION, SOLUTION INTRAVENOUS at 15:46

## 2024-01-06 RX ADMIN — ACETAMINOPHEN 650 MG: 325 TABLET ORAL at 15:45

## 2024-01-06 RX ADMIN — ACETAMINOPHEN 650 MG: 325 TABLET ORAL at 08:28

## 2024-01-06 RX ADMIN — AMPICILLIN SODIUM AND SULBACTAM SODIUM: 2; 1 INJECTION, POWDER, FOR SOLUTION INTRAMUSCULAR; INTRAVENOUS at 22:00

## 2024-01-06 RX ADMIN — Medication 6 MG: at 22:04

## 2024-01-06 RX ADMIN — IOHEXOL 68 ML: 350 INJECTION, SOLUTION INTRAVENOUS at 15:02

## 2024-01-06 RX ADMIN — AMPICILLIN SODIUM AND SULBACTAM SODIUM 3 G: 2; 1 INJECTION, POWDER, FOR SOLUTION INTRAMUSCULAR; INTRAVENOUS at 15:46

## 2024-01-06 RX ADMIN — SODIUM CHLORIDE: 9 INJECTION, SOLUTION INTRAVENOUS at 22:00

## 2024-01-06 RX ADMIN — Medication 400 MG: at 12:02

## 2024-01-06 SDOH — SOCIAL STABILITY: SOCIAL INSECURITY: DO YOU FEEL UNSAFE GOING BACK TO THE PLACE WHERE YOU ARE LIVING?: NO

## 2024-01-06 SDOH — SOCIAL STABILITY: SOCIAL INSECURITY: HAS ANYONE EVER THREATENED TO HURT YOUR FAMILY OR YOUR PETS?: NO

## 2024-01-06 SDOH — SOCIAL STABILITY: SOCIAL INSECURITY: ABUSE: ADULT

## 2024-01-06 SDOH — SOCIAL STABILITY: SOCIAL INSECURITY: ARE YOU OR HAVE YOU BEEN THREATENED OR ABUSED PHYSICALLY, EMOTIONALLY, OR SEXUALLY BY ANYONE?: NO

## 2024-01-06 SDOH — SOCIAL STABILITY: SOCIAL INSECURITY: DOES ANYONE TRY TO KEEP YOU FROM HAVING/CONTACTING OTHER FRIENDS OR DOING THINGS OUTSIDE YOUR HOME?: NO

## 2024-01-06 SDOH — SOCIAL STABILITY: SOCIAL INSECURITY: DO YOU FEEL ANYONE HAS EXPLOITED OR TAKEN ADVANTAGE OF YOU FINANCIALLY OR OF YOUR PERSONAL PROPERTY?: NO

## 2024-01-06 SDOH — SOCIAL STABILITY: SOCIAL INSECURITY: HAVE YOU HAD THOUGHTS OF HARMING ANYONE ELSE?: NO

## 2024-01-06 SDOH — SOCIAL STABILITY: SOCIAL INSECURITY: WERE YOU ABLE TO COMPLETE ALL THE BEHAVIORAL HEALTH SCREENINGS?: YES

## 2024-01-06 SDOH — SOCIAL STABILITY: SOCIAL INSECURITY: ARE THERE ANY APPARENT SIGNS OF INJURIES/BEHAVIORS THAT COULD BE RELATED TO ABUSE/NEGLECT?: NO

## 2024-01-06 ASSESSMENT — PAIN SCALES - GENERAL
PAINLEVEL_OUTOF10: 0 - NO PAIN
PAINLEVEL_OUTOF10: 7
PAINLEVEL_OUTOF10: 5 - MODERATE PAIN
PAINLEVEL_OUTOF10: 6
PAINLEVEL_OUTOF10: 8
PAINLEVEL_OUTOF10: 8
PAINLEVEL_OUTOF10: 3

## 2024-01-06 ASSESSMENT — COGNITIVE AND FUNCTIONAL STATUS - GENERAL
MOBILITY SCORE: 21
PATIENT BASELINE BEDBOUND: NO
DAILY ACTIVITIY SCORE: 24
CLIMB 3 TO 5 STEPS WITH RAILING: A LOT
WALKING IN HOSPITAL ROOM: A LITTLE

## 2024-01-06 ASSESSMENT — PAIN DESCRIPTION - LOCATION
LOCATION: GENERALIZED

## 2024-01-06 ASSESSMENT — ACTIVITIES OF DAILY LIVING (ADL)
GROOMING: INDEPENDENT
PATIENT'S MEMORY ADEQUATE TO SAFELY COMPLETE DAILY ACTIVITIES?: YES
DRESSING YOURSELF: INDEPENDENT
WALKS IN HOME: INDEPENDENT
HEARING - RIGHT EAR: FUNCTIONAL
JUDGMENT_ADEQUATE_SAFELY_COMPLETE_DAILY_ACTIVITIES: YES
ADEQUATE_TO_COMPLETE_ADL: YES
FEEDING YOURSELF: INDEPENDENT
BATHING: INDEPENDENT
LACK_OF_TRANSPORTATION: NO
ASSISTIVE_DEVICE: WALKER
TOILETING: INDEPENDENT
HEARING - LEFT EAR: FUNCTIONAL

## 2024-01-06 ASSESSMENT — PATIENT HEALTH QUESTIONNAIRE - PHQ9
1. LITTLE INTEREST OR PLEASURE IN DOING THINGS: NOT AT ALL
SUM OF ALL RESPONSES TO PHQ9 QUESTIONS 1 & 2: 0
2. FEELING DOWN, DEPRESSED OR HOPELESS: NOT AT ALL

## 2024-01-06 ASSESSMENT — COLUMBIA-SUICIDE SEVERITY RATING SCALE - C-SSRS
6. HAVE YOU EVER DONE ANYTHING, STARTED TO DO ANYTHING, OR PREPARED TO DO ANYTHING TO END YOUR LIFE?: NO
1. IN THE PAST MONTH, HAVE YOU WISHED YOU WERE DEAD OR WISHED YOU COULD GO TO SLEEP AND NOT WAKE UP?: NO
2. HAVE YOU ACTUALLY HAD ANY THOUGHTS OF KILLING YOURSELF?: NO

## 2024-01-06 ASSESSMENT — LIFESTYLE VARIABLES
SKIP TO QUESTIONS 9-10: 1
AUDIT-C TOTAL SCORE: 0
HOW MANY STANDARD DRINKS CONTAINING ALCOHOL DO YOU HAVE ON A TYPICAL DAY: PATIENT DOES NOT DRINK
HOW OFTEN DO YOU HAVE A DRINK CONTAINING ALCOHOL: NEVER
HOW OFTEN DO YOU HAVE 6 OR MORE DRINKS ON ONE OCCASION: NEVER
AUDIT-C TOTAL SCORE: 0

## 2024-01-06 ASSESSMENT — PAIN - FUNCTIONAL ASSESSMENT
PAIN_FUNCTIONAL_ASSESSMENT: 0-10

## 2024-01-06 ASSESSMENT — PAIN DESCRIPTION - DESCRIPTORS: DESCRIPTORS: ACHING

## 2024-01-06 NOTE — CARE PLAN
The clinical goals for the shift include Have heparin be theraputic  Pt had CT chest today and has heparin infusion going. Pt has Rheumatoid arthritis with severe hand deformities and generalized pain. She was resting in bed today--her  is in rehab at this time--her son has been in to visit. Lungs are clear. Tele shows SR. Denies chest pain.

## 2024-01-06 NOTE — PROGRESS NOTES
"Maggie Carrington is a 63 y.o. female on day 0 of admission presenting with Elevated troponin.    Subjective   Patient here yesterday status post syncopal episode after having several days of vomiting 2/2 amoxicillin, patient noted to have elevated troponins but no chest pain no EKG changes  She was initially set up for transfer to [facility] but still no beds  Is on heparin drip           Objective     Physical Exam  Vitals and nursing note reviewed.   Constitutional:       General: She is not in acute distress.     Appearance: She is well-developed.   HENT:      Head: Normocephalic and atraumatic.      Jaw: Swelling present.      Comments: Right-sided mandible slight swelling unchanged from previously     Right Ear: Tympanic membrane, ear canal and external ear normal.      Left Ear: Tympanic membrane, ear canal and external ear normal.      Nose: Nose normal.      Mouth/Throat:      Mouth: Mucous membranes are moist.      Pharynx: Oropharynx is clear.   Eyes:      Conjunctiva/sclera: Conjunctivae normal.   Cardiovascular:      Rate and Rhythm: Normal rate and regular rhythm.      Heart sounds: No murmur heard.  Pulmonary:      Effort: Pulmonary effort is normal. No respiratory distress.      Breath sounds: Normal breath sounds.   Abdominal:      Palpations: Abdomen is soft.      Tenderness: There is no abdominal tenderness.   Musculoskeletal:         General: No swelling.      Cervical back: Neck supple.   Skin:     General: Skin is warm and dry.      Capillary Refill: Capillary refill takes less than 2 seconds.   Neurological:      General: No focal deficit present.      Mental Status: She is alert.   Psychiatric:         Mood and Affect: Mood normal.         Last Recorded Vitals  Blood pressure 105/60, pulse 73, temperature 36.1 °C (97 °F), temperature source Temporal, resp. rate 14, height 1.651 m (5' 5\"), weight (!) 44.5 kg (98 lb), SpO2 98 %.  Intake/Output last 3 Shifts:  I/O last 3 completed shifts:  In: 50 " (1.1 mL/kg) [IV Piggyback:50]  Out: - (0 mL/kg)   Weight: 44.5 kg     Relevant Results              Scheduled medications  clindamycin, 300 mg, intravenous, q8h  metoprolol succinate XL, 50 mg, oral, Daily      Continuous medications  heparin, 0-4,000 Units/hr, Last Rate: 800 Units/hr (01/06/24 1112)      PRN medications  PRN medications: heparin    Results for orders placed or performed during the hospital encounter of 01/05/24 (from the past 24 hour(s))   Comprehensive metabolic panel   Result Value Ref Range    Glucose 108 (H) 74 - 99 mg/dL    Sodium 140 136 - 145 mmol/L    Potassium 3.5 3.5 - 5.3 mmol/L    Chloride 105 98 - 107 mmol/L    Bicarbonate 20 (L) 21 - 32 mmol/L    Anion Gap 19 10 - 20 mmol/L    Urea Nitrogen 11 6 - 23 mg/dL    Creatinine 0.63 0.50 - 1.05 mg/dL    eGFR >90 >60 mL/min/1.73m*2    Calcium 8.4 (L) 8.6 - 10.3 mg/dL    Albumin 3.4 3.4 - 5.0 g/dL    Alkaline Phosphatase 58 33 - 136 U/L    Total Protein 6.4 6.4 - 8.2 g/dL    AST 22 9 - 39 U/L    Bilirubin, Total 0.4 0.0 - 1.2 mg/dL    ALT 16 7 - 45 U/L   Sars-CoV-2 and Influenza A/B PCR   Result Value Ref Range    Flu A Result Not Detected Not Detected    Flu B Result Not Detected Not Detected    Coronavirus 2019, PCR Not Detected Not Detected   Troponin I, High Sensitivity   Result Value Ref Range    Troponin I, High Sensitivity 127 (HH) 0 - 13 ng/L   Magnesium   Result Value Ref Range    Magnesium 1.51 (L) 1.60 - 2.40 mg/dL   aPTT - baseline   Result Value Ref Range    aPTT 27 27 - 38 seconds   Troponin I, High Sensitivity   Result Value Ref Range    Troponin I, High Sensitivity 170 (HH) 0 - 13 ng/L   Heparin Assay, UFH   Result Value Ref Range    Heparin Unfractionated 0.7 See Comment Below for Therapeutic Ranges IU/mL   Heparin Assay, UFH   Result Value Ref Range    Heparin Unfractionated 0.8 See Comment Below for Therapeutic Ranges IU/mL   Heparin Assay, UFH   Result Value Ref Range    Heparin Unfractionated 0.7 See Comment Below for  Therapeutic Ranges IU/mL   Comprehensive metabolic panel   Result Value Ref Range    Glucose 92 74 - 99 mg/dL    Sodium 140 136 - 145 mmol/L    Potassium 3.6 3.5 - 5.3 mmol/L    Chloride 106 98 - 107 mmol/L    Bicarbonate 25 21 - 32 mmol/L    Anion Gap 13 10 - 20 mmol/L    Urea Nitrogen 9 6 - 23 mg/dL    Creatinine 0.48 (L) 0.50 - 1.05 mg/dL    eGFR >90 >60 mL/min/1.73m*2    Calcium 8.3 (L) 8.6 - 10.3 mg/dL    Albumin 3.1 (L) 3.4 - 5.0 g/dL    Alkaline Phosphatase 47 33 - 136 U/L    Total Protein 5.8 (L) 6.4 - 8.2 g/dL    AST 21 9 - 39 U/L    Bilirubin, Total 0.3 0.0 - 1.2 mg/dL    ALT 13 7 - 45 U/L   Troponin I, High Sensitivity   Result Value Ref Range    Troponin I, High Sensitivity 75 (HH) 0 - 13 ng/L   Heparin Assay, UFH   Result Value Ref Range    Heparin Unfractionated 0.7 See Comment Below for Therapeutic Ranges IU/mL         {XR hip right with pelvis when performed 2 or 3 views    Result Date: 1/6/2024  Interpreted By:  Benton Camargo, STUDY: XR HIP RIGHT WITH PELVIS WHEN PERFORMED 2 OR 3 VIEWS;  1/6/2024 8:23 am   INDICATION: Signs/Symptoms:fall.   COMPARISON: None.   ACCESSION NUMBER(S): [id number]   ORDERING CLINICIAN: KHLOE HORNER   FINDINGS: No acute fracture identified. There is severe joint space narrowing of the right hip with femoral head flattening.       No definite acute osseous injury the pelvis or right hip. Severe degenerative changes of the right hip. Femoral head flattening could be related to remote subchondral fracture secondary to avascular necrosis.     Signed by: Benton Camargo 1/6/2024 8:28 AM Dictation workstation:   [id number]    XR chest 1 view    Result Date: 1/5/2024  Interpreted By:  Rodrick Ignacio, STUDY: XR CHEST 1 VIEW;  1/5/2024 8:15 pm   INDICATION: Signs/Symptoms:cp.   COMPARISON: Chest radiograph dated 08/15/2022. Chest CT dated 09/20/2022.   ACCESSION NUMBER(S): [id number]   ORDERING CLINICIAN: JAMISON FREEMAN   FINDINGS:   CARDIOMEDIASTINAL SILHOUETTE:  Cardiomediastinal silhouette is normal in size and configuration.   LUNGS/PLEURA: There are patchy opacities in the lateral right upper lobe, which may represent an acute infectious or inflammatory process. Hyperinflated and hyperlucent lungs. There are no consolidations.There are no pleural effusions. There is no demonstrated pneumothorax.     BONES: No evidence of acute osseous abnormality.       1.  Patchy opacities in the lateral right upper lobe which may represent an acute infectious or inflammatory process. The lungs are mildly hyperinflated and hyperlucent suggestive of emphysematous change.     Signed by: Rodrick Ignacio 1/5/2024 8:22 PM Dictation workstation:   [id number]    XR shoulder left 2+ views    Result Date: 1/5/2024  Interpreted By:  Maynor Bryson, STUDY: XR SHOULDER LEFT 2+ VIEWS; ;  1/5/2024 3:47 pm   INDICATION: Signs/Symptoms:fall pain.   COMPARISON: None.   ACCESSION NUMBER(S): [id number]   ORDERING CLINICIAN: JAMISON FREEMAN   FINDINGS: No fracture or dislocation. No joint effusion. AC joint adrenal gland joint are normal. Lung apex is clear.       No acute findings.     MACRO: None   Signed by: Maynor Bryson 1/5/2024 4:09 PM Dictation workstation:   [id number]    CT maxillofacial bones wo IV contrast    Result Date: 1/5/2024  Interpreted By:  Armen Villareal, STUDY: CT FACIAL BONES WO IV CONTRAST;  1/5/2024 11:51 am   INDICATION: Signs/Symptoms:pain swelling right jaw.   COMPARISON: None.   ACCESSION NUMBER(S): [id number]   ORDERING CLINICIAN: JAMISON FREEMAN   TECHNIQUE: Contiguous axial CT sections are performed through the facial bones and orbits and supplemented with coronal and sagittal reformatted images.   Multiplanar 3D reformations are also generated and reviewed on a separate workstation.   FINDINGS: There is mucoperiosteal thickening of the floor of the left maxillary sinus with possible small fluid level. The remaining paranasal sinuses and mastoid air cells are clear  and symmetrically aerated.   There is mild bowing of the nasal septum to the left. The ostiomeatal units are patent bilaterally.   The visualized orbital contents are unremarkable. The medial and inferior orbital walls are intact. There is no fluid collection or intraorbital air.   There is some soft tissue swelling anterior to the mandible at the midline and lateral to the right mandibular body. There is no soft tissue air or organized fluid collection. There is no underlying bone destruction or aggressive periosteal reaction. There is rounded apical lucency within the left maxilla at the level of the left molar with suspected violation of the medial cortex. This finding measures 1.5 cm in diameter. There is mild multifocal periapical lucencies on the right.       Soft tissue swelling anterior and lateral to the right mandibular body. There is no soft tissue air or localized fluid collection at this site.   1.5 cm round periapical lucency at the left mandibular molar compatible with periapical abscess.   Mild mucoperiosteal thickening at the floor of the left maxillary sinus with possible trace fluid. The remaining paranasal sinuses and mastoid air cells are clear and symmetrically aerated.   Slight bowing of the septum to the left. The ostiomeatal units remain patent bilaterally.   Signed by: Armen Villareal 1/5/2024 12:26 PM Dictation workstation:   [id number]    CT cervical spine wo IV contrast    Result Date: 1/5/2024  Interpreted By:  Armen Villarela, STUDY: CT CERVICAL SPINE WO IV CONTRAST;  1/5/2024 11:51 am   INDICATION: Signs/Symptoms:pain fall.   COMPARISON: None.   ACCESSION NUMBER(S): [id number]   ORDERING CLINICIAN: JAMISON FREEMAN   TECHNIQUE: Contiguous axial CT sections are performed from the skullbase to the upper thoracic spine and supplemented with coronal and sagittal reformatted images.   FINDINGS: There is mild dextro convexity of the cervical spine. The cervical vertebral body AP  alignment is within normal limits. The facet joints align normally.   The cervical vertebral body heights are maintained. There is no sign of acute cervical spine fracture. The C1 ring is intact. There is no bone destruction or aggressive periosteal reaction. No lytic or blastic lesion is identified. The surrounding visualized osseous structures are intact.   There is no evidence of central canal narrowing.   There is multilevel hypertrophic facet arthrosis on the right with at least mild narrowing of the right C3-4 and C4-5 neural foramen.   There is biapical pleural and parenchymal scarring. There are nodular opacities with cavitation and bronchiectasis in the lung apices. The remaining surrounding soft tissues are unremarkable.       No sign of acute fracture or subluxation.   Dextro convexity of the cervical spine with multilevel hypertrophic facet arthrosis on the right.   No central canal narrowing.   Biapical pleural and parenchymal scarring with nodular cavitary apical opacities and bronchiectasis. This appearance has progressed in the interval from 08/09/2022. The need for further workup should be determined clinically.   Signed by: Armen Villareal 1/5/2024 12:20 PM Dictation workstation:   [id number]    CT head wo IV contrast    Result Date: 1/5/2024  Interpreted By:  Armen Villareal, STUDY: CT HEAD WO IV CONTRAST;  1/5/2024 11:51 am   INDICATION: Signs/Symptoms:fall syncope.   COMPARISON: None.   ACCESSION NUMBER(S): [id number]   ORDERING CLINICIAN: JAMISON FREEMAN   TECHNIQUE: Contiguous unenhanced axial CT sections are performed from the skull base to the vertex.   FINDINGS: The osseous structures are intact. The visualized portions of the paranasal sinuses and mastoid air cells are clear. There is mucoperiosteal thickening at the floor of the left maxillary sinus.   The cortical sulci and CSF spaces are symmetric in appearance. There is no sign of parenchymal hematoma or dense extra-axial  fluid collection. There is no mass effect or midline shift. The gray matter/white-matter differentiation is preserved.       No CT evidence of acute intracranial hemorrhage or mass effect.   Signed by: Armen Villareal 1/5/2024 12:16 PM Dictation workstation:   [id number]              Assessment/Plan   Principal Problem:    Elevated troponin    Troponins have been cycling down  Started at 94-> 170, this morning's is down to 75  Spoke with Dr. Star anne with patient staying here  I also spoke with Dr. Head about the dental abscess okay with staying here continue with IV antibiotics    He can see her as telemedicine today or tomorrow and then will see her in person on Monday if needed    Patient will be admitted here instead of being transferred       I spent 32 minutes in the professional and overall care of this patient.      Chaparrita Daniels PA-C

## 2024-01-07 PROBLEM — I21.4 NSTEMI (NON-ST ELEVATED MYOCARDIAL INFARCTION) (MULTI): Status: ACTIVE | Noted: 2024-01-07

## 2024-01-07 PROBLEM — F41.9 ANXIETY: Status: ACTIVE | Noted: 2024-01-07

## 2024-01-07 PROBLEM — Z86.74 HISTORY OF CARDIAC ARREST: Status: ACTIVE | Noted: 2024-01-07

## 2024-01-07 PROBLEM — K04.7 DENTAL ABSCESS: Status: ACTIVE | Noted: 2024-01-07

## 2024-01-07 PROBLEM — M05.79 RHEUMATOID ARTHRITIS INVOLVING MULTIPLE SITES WITH POSITIVE RHEUMATOID FACTOR (MULTI): Status: ACTIVE | Noted: 2024-01-07

## 2024-01-07 PROBLEM — R55 SYNCOPE AND COLLAPSE: Status: ACTIVE | Noted: 2024-01-07

## 2024-01-07 PROBLEM — K57.90 DIVERTICULOSIS: Status: ACTIVE | Noted: 2024-01-07

## 2024-01-07 LAB
ANION GAP SERPL CALC-SCNC: 9 MMOL/L (ref 10–20)
BUN SERPL-MCNC: 6 MG/DL (ref 6–23)
CALCIUM SERPL-MCNC: 7.8 MG/DL (ref 8.6–10.3)
CHLORIDE SERPL-SCNC: 108 MMOL/L (ref 98–107)
CO2 SERPL-SCNC: 25 MMOL/L (ref 21–32)
CREAT SERPL-MCNC: 0.44 MG/DL (ref 0.5–1.05)
ERYTHROCYTE [DISTWIDTH] IN BLOOD BY AUTOMATED COUNT: 12.3 % (ref 11.5–14.5)
ERYTHROCYTE [DISTWIDTH] IN BLOOD BY AUTOMATED COUNT: 12.3 % (ref 11.5–14.5)
GFR SERPL CREATININE-BSD FRML MDRD: >90 ML/MIN/1.73M*2
GLUCOSE SERPL-MCNC: 94 MG/DL (ref 74–99)
HCT VFR BLD AUTO: 33.5 % (ref 36–46)
HCT VFR BLD AUTO: 34 % (ref 36–46)
HGB BLD-MCNC: 10.4 G/DL (ref 12–16)
HGB BLD-MCNC: 10.4 G/DL (ref 12–16)
HOLD SPECIMEN: NORMAL
HOLD SPECIMEN: NORMAL
MCH RBC QN AUTO: 27.7 PG (ref 26–34)
MCH RBC QN AUTO: 27.8 PG (ref 26–34)
MCHC RBC AUTO-ENTMCNC: 30.6 G/DL (ref 32–36)
MCHC RBC AUTO-ENTMCNC: 31 G/DL (ref 32–36)
MCV RBC AUTO: 89 FL (ref 80–100)
MCV RBC AUTO: 91 FL (ref 80–100)
NRBC BLD-RTO: 0 /100 WBCS (ref 0–0)
NRBC BLD-RTO: 0 /100 WBCS (ref 0–0)
PLATELET # BLD AUTO: 172 X10*3/UL (ref 150–450)
PLATELET # BLD AUTO: 176 X10*3/UL (ref 150–450)
POTASSIUM SERPL-SCNC: 3.4 MMOL/L (ref 3.5–5.3)
RBC # BLD AUTO: 3.74 X10*6/UL (ref 4–5.2)
RBC # BLD AUTO: 3.76 X10*6/UL (ref 4–5.2)
SODIUM SERPL-SCNC: 139 MMOL/L (ref 136–145)
UFH PPP CHRO-ACNC: 0.3 IU/ML
UFH PPP CHRO-ACNC: 0.5 IU/ML
WBC # BLD AUTO: 3.3 X10*3/UL (ref 4.4–11.3)
WBC # BLD AUTO: 3.5 X10*3/UL (ref 4.4–11.3)

## 2024-01-07 PROCEDURE — 85520 HEPARIN ASSAY: CPT | Performed by: INTERNAL MEDICINE

## 2024-01-07 PROCEDURE — 36415 COLL VENOUS BLD VENIPUNCTURE: CPT

## 2024-01-07 PROCEDURE — 80048 BASIC METABOLIC PNL TOTAL CA: CPT

## 2024-01-07 PROCEDURE — 1200000002 HC GENERAL ROOM WITH TELEMETRY DAILY: Mod: IPSPLIT

## 2024-01-07 PROCEDURE — 2500000001 HC RX 250 WO HCPCS SELF ADMINISTERED DRUGS (ALT 637 FOR MEDICARE OP): Mod: IPSPLIT | Performed by: NURSE PRACTITIONER

## 2024-01-07 PROCEDURE — 2500000004 HC RX 250 GENERAL PHARMACY W/ HCPCS (ALT 636 FOR OP/ED): Mod: IPSPLIT

## 2024-01-07 PROCEDURE — 85027 COMPLETE CBC AUTOMATED: CPT

## 2024-01-07 PROCEDURE — C9113 INJ PANTOPRAZOLE SODIUM, VIA: HCPCS

## 2024-01-07 PROCEDURE — 2500000004 HC RX 250 GENERAL PHARMACY W/ HCPCS (ALT 636 FOR OP/ED)

## 2024-01-07 PROCEDURE — 99223 1ST HOSP IP/OBS HIGH 75: CPT | Performed by: NURSE PRACTITIONER

## 2024-01-07 PROCEDURE — 36415 COLL VENOUS BLD VENIPUNCTURE: CPT | Mod: IPSPLIT | Performed by: INTERNAL MEDICINE

## 2024-01-07 PROCEDURE — 2500000004 HC RX 250 GENERAL PHARMACY W/ HCPCS (ALT 636 FOR OP/ED): Performed by: PHYSICIAN ASSISTANT

## 2024-01-07 PROCEDURE — 2500000004 HC RX 250 GENERAL PHARMACY W/ HCPCS (ALT 636 FOR OP/ED): Performed by: NURSE PRACTITIONER

## 2024-01-07 PROCEDURE — 94760 N-INVAS EAR/PLS OXIMETRY 1: CPT

## 2024-01-07 PROCEDURE — 87040 BLOOD CULTURE FOR BACTERIA: CPT | Mod: GENLAB | Performed by: INTERNAL MEDICINE

## 2024-01-07 PROCEDURE — G0408 INPT/TELE FOLLOW UP 35: HCPCS | Performed by: OTOLARYNGOLOGY

## 2024-01-07 PROCEDURE — 2500000001 HC RX 250 WO HCPCS SELF ADMINISTERED DRUGS (ALT 637 FOR MEDICARE OP): Mod: IPSPLIT

## 2024-01-07 PROCEDURE — 99222 1ST HOSP IP/OBS MODERATE 55: CPT | Performed by: NURSE PRACTITIONER

## 2024-01-07 RX ORDER — SODIUM CHLORIDE 9 MG/ML
INJECTION, SOLUTION INTRAVENOUS
Status: COMPLETED
Start: 2024-01-07 | End: 2024-01-07

## 2024-01-07 RX ORDER — SODIUM CHLORIDE 9 MG/ML
75 INJECTION, SOLUTION INTRAVENOUS CONTINUOUS
Status: DISCONTINUED | OUTPATIENT
Start: 2024-01-07 | End: 2024-01-10 | Stop reason: HOSPADM

## 2024-01-07 RX ORDER — POTASSIUM CHLORIDE 20 MEQ/1
20 TABLET, EXTENDED RELEASE ORAL ONCE
Status: COMPLETED | OUTPATIENT
Start: 2024-01-07 | End: 2024-01-07

## 2024-01-07 RX ORDER — ATORVASTATIN CALCIUM 40 MG/1
40 TABLET, FILM COATED ORAL NIGHTLY
Status: DISCONTINUED | OUTPATIENT
Start: 2024-01-07 | End: 2024-01-10 | Stop reason: HOSPADM

## 2024-01-07 RX ORDER — POLYETHYLENE GLYCOL 3350 17 G/17G
17 POWDER, FOR SOLUTION ORAL DAILY PRN
Status: DISCONTINUED | OUTPATIENT
Start: 2024-01-07 | End: 2024-01-10 | Stop reason: HOSPADM

## 2024-01-07 RX ORDER — ASPIRIN 81 MG/1
81 TABLET ORAL DAILY
Status: DISCONTINUED | OUTPATIENT
Start: 2024-01-07 | End: 2024-01-10 | Stop reason: HOSPADM

## 2024-01-07 RX ORDER — AMPICILLIN AND SULBACTAM 2; 1 G/1; G/1
INJECTION, POWDER, FOR SOLUTION INTRAMUSCULAR; INTRAVENOUS
Status: COMPLETED
Start: 2024-01-07 | End: 2024-01-07

## 2024-01-07 RX ADMIN — AMPICILLIN SODIUM AND SULBACTAM SODIUM: 2; 1 INJECTION, POWDER, FOR SOLUTION INTRAMUSCULAR; INTRAVENOUS at 03:30

## 2024-01-07 RX ADMIN — ASPIRIN 81 MG: 81 TABLET, COATED ORAL at 13:02

## 2024-01-07 RX ADMIN — AMPICILLIN SODIUM AND SULBACTAM SODIUM 3 G: 2; 1 INJECTION, POWDER, FOR SOLUTION INTRAMUSCULAR; INTRAVENOUS at 09:50

## 2024-01-07 RX ADMIN — Medication 6 MG: at 21:59

## 2024-01-07 RX ADMIN — ACETAMINOPHEN 650 MG: 325 TABLET ORAL at 15:58

## 2024-01-07 RX ADMIN — ATORVASTATIN CALCIUM 40 MG: 40 TABLET, FILM COATED ORAL at 21:59

## 2024-01-07 RX ADMIN — AMPICILLIN SODIUM AND SULBACTAM SODIUM 3 G: 2; 1 INJECTION, POWDER, FOR SOLUTION INTRAMUSCULAR; INTRAVENOUS at 15:40

## 2024-01-07 RX ADMIN — ACETAMINOPHEN 650 MG: 325 TABLET ORAL at 04:58

## 2024-01-07 RX ADMIN — SODIUM CHLORIDE 500 ML: 9 INJECTION, SOLUTION INTRAVENOUS at 04:49

## 2024-01-07 RX ADMIN — SODIUM CHLORIDE 100 ML: 9 INJECTION, SOLUTION INTRAVENOUS at 21:58

## 2024-01-07 RX ADMIN — AMPICILLIN SODIUM AND SULBACTAM SODIUM 3 G: 2; 1 INJECTION, POWDER, FOR SOLUTION INTRAMUSCULAR; INTRAVENOUS at 03:36

## 2024-01-07 RX ADMIN — Medication 400 MG: at 09:49

## 2024-01-07 RX ADMIN — PANTOPRAZOLE SODIUM 40 MG: 40 INJECTION, POWDER, FOR SOLUTION INTRAVENOUS at 06:44

## 2024-01-07 RX ADMIN — AMPICILLIN SODIUM AND SULBACTAM SODIUM 3 G: 2; 1 INJECTION, POWDER, FOR SOLUTION INTRAMUSCULAR; INTRAVENOUS at 21:59

## 2024-01-07 RX ADMIN — ACETAMINOPHEN 650 MG: 325 TABLET ORAL at 22:00

## 2024-01-07 RX ADMIN — ACETAMINOPHEN 650 MG: 325 TABLET ORAL at 09:48

## 2024-01-07 RX ADMIN — SODIUM CHLORIDE 75 ML/HR: 9 INJECTION, SOLUTION INTRAVENOUS at 13:03

## 2024-01-07 RX ADMIN — TICAGRELOR 180 MG: 90 TABLET ORAL at 13:02

## 2024-01-07 RX ADMIN — SODIUM CHLORIDE: 9 INJECTION, SOLUTION INTRAVENOUS at 03:30

## 2024-01-07 RX ADMIN — POTASSIUM CHLORIDE 20 MEQ: 1500 TABLET, EXTENDED RELEASE ORAL at 15:40

## 2024-01-07 ASSESSMENT — PAIN SCALES - WONG BAKER
WONGBAKER_NUMERICALRESPONSE: NO HURT

## 2024-01-07 ASSESSMENT — PAIN SCALES - GENERAL
PAINLEVEL_OUTOF10: 6
PAINLEVEL_OUTOF10: 0 - NO PAIN
PAINLEVEL_OUTOF10: 6
PAINLEVEL_OUTOF10: 3
PAINLEVEL_OUTOF10: 4
PAINLEVEL_OUTOF10: 0 - NO PAIN
PAINLEVEL_OUTOF10: 4
PAINLEVEL_OUTOF10: 6
PAINLEVEL_OUTOF10: 7
PAINLEVEL_OUTOF10: 4
PAINLEVEL_OUTOF10: 0 - NO PAIN

## 2024-01-07 ASSESSMENT — ENCOUNTER SYMPTOMS
LIGHT-HEADEDNESS: 1
DIZZINESS: 1
VOMITING: 1
CHEST TIGHTNESS: 0
FACIAL SWELLING: 1
PALPITATIONS: 0
WHEEZING: 1
SHORTNESS OF BREATH: 0
DIARRHEA: 1
NAUSEA: 1

## 2024-01-07 ASSESSMENT — COGNITIVE AND FUNCTIONAL STATUS - GENERAL
DAILY ACTIVITIY SCORE: 20
TURNING FROM BACK TO SIDE WHILE IN FLAT BAD: A LITTLE
STANDING UP FROM CHAIR USING ARMS: A LOT
HELP NEEDED FOR BATHING: A LITTLE
DRESSING REGULAR UPPER BODY CLOTHING: A LITTLE
WALKING IN HOSPITAL ROOM: A LOT
TOILETING: A LITTLE
DRESSING REGULAR LOWER BODY CLOTHING: A LITTLE
MOBILITY SCORE: 16
CLIMB 3 TO 5 STEPS WITH RAILING: A LOT
MOVING TO AND FROM BED TO CHAIR: A LITTLE

## 2024-01-07 ASSESSMENT — PAIN - FUNCTIONAL ASSESSMENT
PAIN_FUNCTIONAL_ASSESSMENT: WONG-BAKER FACES
PAIN_FUNCTIONAL_ASSESSMENT: 0-10
PAIN_FUNCTIONAL_ASSESSMENT: WONG-BAKER FACES
PAIN_FUNCTIONAL_ASSESSMENT: WONG-BAKER FACES
PAIN_FUNCTIONAL_ASSESSMENT: 0-10

## 2024-01-07 ASSESSMENT — PAIN DESCRIPTION - ORIENTATION
ORIENTATION: RIGHT
ORIENTATION: LEFT;RIGHT
ORIENTATION: RIGHT;LEFT

## 2024-01-07 ASSESSMENT — PAIN DESCRIPTION - LOCATION
LOCATION: HIP
LOCATION: HIP
LOCATION: GENERALIZED
LOCATION: HIP

## 2024-01-07 NOTE — CONSULTS
Cardiology Consult Note  Patient: Maggie Carrington  Unit/Bed: 233/233-A  YOB: 1960    Admitting Diagnosis: Syncope and collapse [R55]  Dental abscess [K04.7]  NSTEMI (non-ST elevated myocardial infarction) (CMS/Formerly [facility]) [I21.4]  Elevated troponin [R79.89]  Date:  1/5/2024  Hospital Day: 0  Attending: Radha Francis MD      Chief Complaint   Patient presents with    Syncope     Pt. Being treated with amoxicillen since 1/2/24 for a tooth infection, has been vomiting every time she takes the medication.  Was walking to the bathroom when her legs became shakey, she got dizzy and then she woke up on the floor and called 911.  Pt. Denies any injury from the fall.        SUBJECTIVE:     History of Present Illness:  63 YOF presented via [facility] ED with c/o dizziness / malaise after several days of vomiting after starting antibiotic for dental infection.    hsTroponin returned at  returned elevated at 94--127-170--75 and she has been persistently hypotensive     She follows with Dr. Alas and reports a hx of HF and PAF ~2021.  During a hospitalization for sepsis.  She does have a history of cardiac arrest -- I don't have any detailed records of this  MPI stress testing May 2023 showed an old septal infarct  She is disabled at baseline with arthritis, denies chest discomfort or unusual shortness of breath       Allergies:  Allergies   Allergen Reactions    Duloxetine GI Upset      Home Medication:  Medications Prior to Admission   Medication Sig Dispense Refill Last Dose    abatacept (Orencia ClickJect) 125 mg/mL auto-injector auto-injection Inject 1 mL (125 mg) under the skin 1 (one) time per week. 4 mL 5     abatacept (Orencia) 125 mg/mL injection Inject 1 mL (125 mg) under the skin 1 (one) time per week.       aspirin-acetaminophen-caffeine (Excedrin Extra Strength) 250-250-65 mg tablet Take 2 tablets by mouth every 6 hours if needed.       metoprolol succinate XL (Toprol-XL) 50 mg 24 hr tablet Take 1  tablet (50 mg) by mouth 2 times a day. 180 tablet 3       PMHx:  Past Medical History:   Diagnosis Date    Anxiety disorder, unspecified     Anxiety    Encounter for screening for other viral diseases 02/08/2016    Need for hepatitis B screening test    Muscle weakness (generalized)     Muscle weakness    Other conditions influencing health status     Localized Joint Stiffness Occurring In More Than One Joint    Other conditions influencing health status 11/06/2015    History of cough    Pain in unspecified ankle and joints of unspecified foot 07/08/2014    Ankle joint pain    Pain in unspecified joint     Multiple joint pain    Personal history of other specified conditions     History of headache    Personal history of other specified conditions     History of fatigue    Pneumonia, unspecified organism 11/10/2015    Community acquired pneumonia    Pneumonia, unspecified organism 11/06/2015    Community acquired pneumonia       PSHx:  Past Surgical History:   Procedure Laterality Date    CT GUIDED PERCUTANEOUS PERITONEAL OR RETROPERITONEAL FLUID COLLECTION DRAINAGE  1/31/2022    CT GUIDED PERCUTANEOUS PERITONEAL OR RETROPERITONEAL FLUID COLLECTION DRAINAGE [facility] INPATIENT LEGACY    CT GUIDED PERCUTANEOUS PERITONEAL OR RETROPERITONEAL FLUID COLLECTION DRAINAGE  8/10/2022    CT GUIDED PERCUTANEOUS PERITONEAL OR RETROPERITONEAL FLUID COLLECTION DRAINAGE [facility] INPATIENT LEGACY       Social Hx:  Social History     Socioeconomic History    Marital status:      Spouse name: None    Number of children: None    Years of education: None    Highest education level: None   Occupational History    None   Tobacco Use    Smoking status: Never     Passive exposure: Never    Smokeless tobacco: Never   Vaping Use    Vaping Use: Never used   Substance and Sexual Activity    Alcohol use: Never    Drug use: Never    Sexual activity: None   Other Topics Concern    None   Social History Narrative    None     Social Determinants of  Health     Financial Resource Strain: Low Risk  (1/6/2024)    Overall Financial Resource Strain (CARDIA)     Difficulty of Paying Living Expenses: Not hard at all   Food Insecurity: Not on file   Transportation Needs: No Transportation Needs (1/6/2024)    PRAPARE - Transportation     Lack of Transportation (Medical): No     Lack of Transportation (Non-Medical): No   Physical Activity: Not on file   Stress: Not on file   Social Connections: Not on file   Intimate Partner Violence: Not on file   Housing Stability: Low Risk  (1/6/2024)    Housing Stability Vital Sign     Unable to Pay for Housing in the Last Year: No     Number of Places Lived in the Last Year: 1     Unstable Housing in the Last Year: No       Family Hx:  No family history on file.    Review of Systems:   12 system review of symptoms is negative except as noted above             OBJECTIVE  Vitals:   Visit Vitals  BP (!) 90/48 (BP Location: Right arm)   Pulse 71   Temp 36.6 °C (97.9 °F) (Temporal)   Resp 16        Wt Readings from Last 5 Encounters:   01/06/24 45.1 kg (99 lb 6.8 oz)   12/05/23 46.3 kg (102 lb)   08/09/23 (!) 44 kg (97 lb)   04/25/23 (!) 44 kg (97 lb)   04/19/23 (!) 44 kg (97 lb)       Intake / Output:   I/O last 3 completed shifts:  In: 1355.3 (30.1 mL/kg) [P.O.:620; I.V.:315.3 (7 mL/kg); IV Piggyback:420]  Out: 1610 (35.7 mL/kg) [Urine:560 (0.3 mL/kg/hr); Stool:1050]  Weight: 45.1 kg      Tele monitoring: NSR    Physical Examination:    Constitutional:       Appearance: Healthy appearance. Not in distress.   Eyes:      Conjunctiva/sclera: Conjunctivae normal.   Neck:      Vascular: JVD normal.   Pulmonary:      Effort: Pulmonary effort is normal.      Breath sounds: Normal breath sounds.   Cardiovascular:      PMI at left midclavicular line. Normal rate. Regular rhythm. Normal S1. Normal S2.       Murmurs: There is no murmur.      No rub.   Pulses:     Intact distal pulses.   Edema:     Peripheral edema absent.   Abdominal:       General: Bowel sounds are normal.   Musculoskeletal:      Cervical back: Neck supple. Skin:     General: Skin is warm and dry.   Neurological:      Mental Status: Alert and oriented to person, place and time.            LABS:  CMP:   Recent Labs     01/07/24  0726 01/06/24  0812 01/05/24  1307 01/05/24  1156 01/05/24  1120 04/25/23  1156 11/04/22  1422 08/19/22  0729 08/18/22  0517 08/17/22  0704 08/16/22  0611 08/15/22  0700 08/14/22  0548 08/13/22  0546 08/12/22  0349 08/11/22  0452 08/10/22  2012 08/10/22  0323 08/09/22  0422 08/08/22  0429    140  --  140 140 137 137 130* 128* 131* 133 132* 138 138 141 143   < > 146*   < > 142   K 3.4* 3.6  --  3.5 3.5 4.5 4.5 3.4 4.0 3.8 4.5 4.3 3.6 3.5 3.5 3.7   < > 4.1   < > 3.4   * 106  --  105 105 104 103 96* 93* 95* 94* 95* 100 99 104 106   < > 111*   < > 102   CO2 25 25  --  20* 21 21 21* 28 25 28 31 30 32* 30 29 28   < > 26   < > 31   ANIONGAP 9* 13  --  19 18 17 13 7 10 8 8 7 6 9 8 10   < > 9   < > 9   BUN 6 9  --  11 11 20 18 3* 3* 4* 6* 6* 5* 6* 7* 11   < > 12   < > 11   CREATININE 0.44* 0.48*  --  0.63 0.61 0.61 0.6 0.3* 0.3* 0.3* 0.3* 0.3* 0.3* 0.3* 0.3* 0.3*   < > 0.5   < > 0.6   EGFR >90 >90  --  >90 >90  --  101 121 121 121 121 121 121 121 121 121   < > 107   < > 102   MG  --  1.59* 1.51*  --   --   --   --   --   --  1.7  --  1.6 1.6 1.6 1.7 1.9  --  1.7  --  2.0    < > = values in this interval not displayed.     LIVER ENZYMES:   Recent Labs     01/06/24  0812 01/05/24  1156 04/25/23  1156 11/04/22  1422 08/19/22  0729 08/18/22  0517 08/17/22  0704 08/15/22  0700 08/06/22  0249 08/01/22  2026 01/29/22  0616 01/28/22  1829   ALBUMIN 3.1* 3.4 3.7 3.6 2.2* 2.3* 2.1* 2.4*   < > 3.8   < > 3.5   ALT 13 16 19 24 11 14 16 27   < > 4*   < > 7   AST 21 22 19 28 13 21 17 30   < > 11   < > 15   BILITOT 0.3 0.4 0.3 0.2 0.5 0.7 0.7 0.8   < > 0.6   < > 0.4   LIPASE  --   --   --   --   --   --   --   --   --  7*  --  16    < > = values in this interval not  "displayed.     CBC:  Recent Labs     01/07/24  0726 01/06/24  2353 01/05/24  1120 04/25/23  1156 11/04/22  1422 08/19/22  0729 08/18/22  1305 08/17/22  0704   WBC 3.3* 3.5* 4.3* 5.3 6.4 6.5 8.8 9.8   HGB 10.4* 10.4* 11.7* 12.8 12.8 8.9* 10.0* 8.6*   HCT 34.0* 33.5* 39.1 40.3 40.4 28.1* 31.5* 27.1*    172 171 231 327 312 330 328   MCV 91 89 91 90 91.6 89.8 89.2 91.2     COAG:   Recent Labs     08/10/22  0323 08/08/22  1731 08/06/22  0249 08/05/22  0402 08/05/22  0013 08/04/22  2203 08/01/22 2026 01/31/22  1022   INR 1.0 1.2* 1.6* 1.7* SAMPLE HEMOLYZED TO BE RECOLLECTED SAMPLE PROBLEM SUSPECTED 1.1 1.2*     ABO:   Recent Labs     08/16/22  1106   ABO O  NEGATIVE     HEME/ENDO:  Recent Labs     08/09/22  0422 08/08/22  0429 06/01/22  1502 07/26/18  1017   FERRITIN  --   --  113  --    IRONSAT  --   --  7.2*  --    TSH 3.93 3.23  --   --    HGBA1C 5.1  --   --  5.2      CARDIAC:   Recent Labs     01/06/24  0812 01/05/24  1526 01/05/24  1307 01/05/24  1123 08/09/22  0422 08/08/22  0429 08/01/22  2026   LDH  --   --   --   --  396* 445*  --    TROPHS 75* 170* 127* 94*  --   --  6       Lipid Panel:  No results found for: \"HDL\", \"CHHDL\", \"VLDL\", \"TRIG\", \"NHDL\"       Current Medications:   MEDICATIONS  Infusions:  heparin, Last Rate: 900 Units/hr (01/06/24 8016)      Scheduled:  ampicillin-sulbactam, 3 g, q6h  magnesium oxide, 400 mg, Daily  melatonin, 6 mg, Daily  metoprolol succinate XL, 50 mg, BID  pantoprazole, 40 mg, Daily before breakfast   Or  pantoprazole, 40 mg, Daily before breakfast  polyethylene glycol, 17 g, Daily      PRN:  acetaminophen, 650 mg, q4h PRN   Or  acetaminophen, 650 mg, q4h PRN   Or  acetaminophen, 650 mg, q4h PRN  acetaminophen, 650 mg, q4h PRN   Or  acetaminophen, 650 mg, q4h PRN   Or  acetaminophen, 650 mg, q4h PRN  benzocaine-menthoL, 1 lozenge, q2h PRN  dextromethorphan-guaifenesin, 5 mL, q4h PRN  guaiFENesin, 600 mg, q12h PRN  heparin, 1,500-3,000 Units, q4h PRN  ondansetron ODT, 4 " mg, q8h PRN   Or  ondansetron, 4 mg, q8h PRN            Cardiovascular studies:    EKG dated 1/05/2024 independently reviewed 1751  NSR 91. Non-specific ST changes     EKG dated 1/05/2024 independently reviewed 1252  NSR 91               LAST IMAGING RESULTS   CT angio chest for pulmonary embolism  Narrative: Interpreted By:  Estrella Hewitt,   STUDY:  CT ANGIO CHEST FOR PULMONARY EMBOLISM;  1/6/2024 3:01 pm      INDICATION:  Signs/Symptoms:elevated d-dimer.      COMPARISON:  08/09/2022      ACCESSION NUMBER(S):  [id number]      ORDERING CLINICIAN:  ELIANA RAVI      TECHNIQUE:  Serial axial CT images of the chest obtained following the  intravenous administration 60 mL Omnipaque 350. Sagittal, coronal and  MIP reconstructions were generated.      FINDINGS:  VESSELS: There is good opacification of the pulmonary arteries. There  are no obvious pulmonary artery emboli.      The aorta and cava unremarkable.      HEART:  The heart is normal in size.      MEDIASTINUM AND GEMMA: There is no significant mediastinal or hilar  lymphadenopathy.      LUNG, PLEURA, AND LARGE AIRWAYS: The lungs are hyperinflated. There  are bilateral patchy nodular and cavitary infiltrates. There is no  obvious pleural fluid.      CHEST WALL AND LOWER NECK: The thyroid as visualized is unremarkable.  There is minimal subcutaneous fat.      BONES: Patient is kyphotic.      UPPER ABDOMEN: Unremarkable      COMPARISON TO THE PRIOR EXAM:  There is less consolidation at the left base there is resolution of  the left pleural effusion. There however has been progression of  upper lobe infiltrates.      Impression: No evidence of PE.      The lungs are hyperinflated.      Parenchymal patchy nodular infiltrates bilaterally. There are areas  of cavitation within the infiltrates.      The left lower lobe consolidation on the previous exam and the left  pleural fluid has largely resolved.      Signed by: Estrella Hewitt 1/6/2024 3:56 PM  Dictation workstation:    [id number]  CT maxillofacial bones wo IV contrast, CT 3D reconstruction  Narrative: Interpreted By:  Armen Villareal,   STUDY:  CT FACIAL BONES WO IV CONTRAST;  1/5/2024 11:51 am      INDICATION:  Signs/Symptoms:pain swelling right jaw.      COMPARISON:  None.      ACCESSION NUMBER(S):  [id number]      ORDERING CLINICIAN:  JAMISON FREEMAN      TECHNIQUE:  Contiguous axial CT sections are performed through the facial bones  and orbits and supplemented with coronal and sagittal reformatted  images.      Multiplanar 3D reformations are also generated and reviewed on a  separate workstation.      FINDINGS:  There is mucoperiosteal thickening of the floor of the left maxillary  sinus with possible small fluid level. The remaining paranasal  sinuses and mastoid air cells are clear and symmetrically aerated.      There is mild bowing of the nasal septum to the left. The ostiomeatal  units are patent bilaterally.      The visualized orbital contents are unremarkable. The medial and  inferior orbital walls are intact. There is no fluid collection or  intraorbital air.      There is some soft tissue swelling anterior to the mandible at the  midline and lateral to the right mandibular body. There is no soft  tissue air or organized fluid collection. There is no underlying bone  destruction or aggressive periosteal reaction. There is rounded  apical lucency within the left maxilla at the level of the left molar  with suspected violation of the medial cortex. This finding measures  1.5 cm in diameter. There is mild multifocal periapical lucencies on  the right.      Impression: Soft tissue swelling anterior and lateral to the right mandibular  body. There is no soft tissue air or localized fluid collection at  this site.      1.5 cm round periapical lucency at the left mandibular molar  compatible with periapical abscess.      Mild mucoperiosteal thickening at the floor of the left maxillary  sinus with possible trace  fluid. The remaining paranasal sinuses and  mastoid air cells are clear and symmetrically aerated.      Slight bowing of the septum to the left. The ostiomeatal units remain  patent bilaterally.      Signed by: Armen Villareal 1/5/2024 12:26 PM  Dictation workstation:   [id number]  XR hip right with pelvis when performed 2 or 3 views  Narrative: Interpreted By:  Benton Camargo,   STUDY:  XR HIP RIGHT WITH PELVIS WHEN PERFORMED 2 OR 3 VIEWS;  1/6/2024 8:23  am      INDICATION:  Signs/Symptoms:fall.      COMPARISON:  None.      ACCESSION NUMBER(S):  [id number]      ORDERING CLINICIAN:  KHLOE HORNER      FINDINGS:  No acute fracture identified. There is severe joint space narrowing  of the right hip with femoral head flattening.      Impression: No definite acute osseous injury the pelvis or right hip. Severe  degenerative changes of the right hip. Femoral head flattening could  be related to remote subchondral fracture secondary to avascular  necrosis.          Signed by: Benton Camargo 1/6/2024 8:28 AM  Dictation workstation:   [id number]        Assessment/Plan   63 YOF with NSTEMI vs Type II MI in the setting of dental abscess     She reports PAF at home prior to admission and reports palpitations / tachycardia during this acute illness.  Telemetry / EKG are NSR although there are dynamic ST / T wave abnormalities in the inferior / lateral leads     MPI stress testing May 2023 showed an old septal infarct  She is disabled at baseline with rheumatoid arthritis,      Plan:  OK to Stop heparin infusion   ASA 81 mg daily   Brilinta loading dose followed by 90 mg BID   Atorvastatin 40 mg HS   Consider transfer to [facility] for evaluation by primary cardiologist and determination of ischemic evaluation         Thank you  for the consult   Please call or message with questions or concerns   The case was discussed with AINSLEY Watson         Electronically signed by Danielle Ann  APRN-CNP on 1/7/2024 at 8:49 AM

## 2024-01-07 NOTE — H&P
History Of Present Illness  Maggie Carrington is a 63 y.o. female with PMH of anxiety, rhuematoid arthritis, diverticulosis s/p colostomy, cardiac arrest 8/2022 who presented to ED 1/6 after a syncopal episode at home with fall. She states she has been taking antibiotics for a few days for a dental abscess which have been making her vomit. She was walking to the bathroom yesterday and became lightheaded and dizzy, causing her to fall and hit her chin. She states she woke up on the floor and came to ED. In ED she denied having CP, SOB, headache, n/t. Her ED workup included CT of head, Cspine, L shoulder, chest and R hip which showed no fracture or acute injury. CT angio showed no PE. Lab derangements include HST of 94, 127, 170. Her EKG had some TWI with no RAMANDEEP. Dr Graves was consulted from ED and accepted her here to have cardiac evaluation done. Dr Head was also consulted for her dental abscess and she was started on IV unasyn. She was also started on IV heparin gtt for cardiac protection and admitted to Medicine for further evaluation and treatment.     Past Medical History  Past Medical History:   Diagnosis Date    Anxiety     Arthritis     Encounter for screening for other viral diseases 02/08/2016    Need for hepatitis B screening test    Muscle weakness (generalized)     Muscle weakness    Pain in unspecified joint     Multiple joint pain    Pneumonia, unspecified organism 11/10/2015    Community acquired pneumonia     RA, diverticulosiis, colostomy 8/2022, cardiac arrest8/4/22  Surgical History  Past Surgical History:   Procedure Laterality Date    CT GUIDED PERCUTANEOUS PERITONEAL OR RETROPERITONEAL FLUID COLLECTION DRAINAGE  1/31/2022    CT GUIDED PERCUTANEOUS PERITONEAL OR RETROPERITONEAL FLUID COLLECTION DRAINAGE [facility]PATIENT [facility]GAC[facility]    CT GUIDED PERCUTANEOUS PERITONEAL OR RETROPERITONEAL FLUID COLLECTION DRAINAGE  8/10/2022    CT GUIDED PERCUTANEOUS PERITONEAL OR RETROPERITONEAL FLUID COLLECTION DRAINAGE  [facility]IENT LEGAC[facility]      Social History  She reports that she has never smoked. She has never been exposed to tobacco smoke. She has never used smokeless tobacco. She reports that she does not drink alcohol and does not use drugs.    Family History  No family history on file.   Mother dm, faterh psoriatic arth  Allergies  Duloxetine    Review of Systems   HENT:  Positive for dental problem and facial swelling.    Respiratory:  Positive for wheezing. Negative for chest tightness and shortness of breath.    Cardiovascular:  Negative for chest pain, palpitations and leg swelling.   Gastrointestinal:  Positive for diarrhea, nausea and vomiting.   Neurological:  Positive for dizziness, syncope and light-headedness.   All other systems reviewed and are negative.       Physical Exam  Constitutional:       General: She is not in acute distress.     Comments: Cachectic     HENT:      Head: Normocephalic.      Mouth/Throat:      Mouth: Mucous membranes are moist.   Eyes:      Extraocular Movements: Extraocular movements intact.      Pupils: Pupils are equal, round, and reactive to light.   Cardiovascular:      Rate and Rhythm: Normal rate and regular rhythm.      Pulses: Normal pulses.      Heart sounds: Normal heart sounds.   Pulmonary:      Breath sounds: Normal breath sounds. No wheezing or rales.      Comments: Poor effort    Abdominal:      General: Bowel sounds are normal. There is no distension.      Tenderness: There is no abdominal tenderness. There is no guarding.   Musculoskeletal:         General: Swelling, tenderness and deformity present.      Cervical back: Rigidity present.      Comments: Bilateral hands and feet with nodules, deformities in fingers and toes   Skin:     General: Skin is warm and dry.      Capillary Refill: Capillary refill takes less than 2 seconds.      Coloration: Skin is pale.   Neurological:      General: No focal deficit present.      Mental Status: She is alert and oriented to person,  "place, and time.   Psychiatric:         Mood and Affect: Mood normal.         Behavior: Behavior normal.        Last Recorded Vitals  Blood pressure (!) 83/47, pulse 62, temperature 36 °C (96.8 °F), temperature source Temporal, resp. rate 16, height 1.651 m (5' 5\"), weight 45.1 kg (99 lb 6.8 oz), SpO2 96 %.    Scheduled medications  ampicillin-sulbactam, 3 g, intravenous, q6h  aspirin, 81 mg, oral, Daily  atorvastatin, 40 mg, oral, Nightly  magnesium oxide, 400 mg, oral, Daily  melatonin, 6 mg, oral, Daily  [Held by provider] metoprolol succinate XL, 50 mg, oral, BID  pantoprazole, 40 mg, oral, Daily before breakfast   Or  pantoprazole, 40 mg, intravenous, Daily before breakfast  [START ON 1/8/2024] ticagrelor, 90 mg, oral, BID    Continuous medications  heparin, 0-4,000 Units/hr, Last Rate: 900 Units/hr (01/06/24 1546)  sodium chloride 0.9%, 75 mL/hr, Last Rate: 75 mL/hr (01/07/24 1303)    PRN medications  PRN medications: acetaminophen **OR** acetaminophen **OR** acetaminophen, acetaminophen **OR** acetaminophen **OR** acetaminophen, benzocaine-menthoL, dextromethorphan-guaifenesin, guaiFENesin, heparin, ondansetron ODT **OR** ondansetron, polyethylene glycol    Relevant Results    CT angio chest for pulmonary embolism  Result Date: 1/6/2024  No evidence of PE.   The lungs are hyperinflated.   Parenchymal patchy nodular infiltrates bilaterally. There are areas of cavitation within the infiltrates.   The left lower lobe consolidation on the previous exam and the left pleural fluid has largely resolved.       XR hip right with pelvis when performed 2 or 3 views  Result Date: 1/6/2024  No definite acute osseous injury the pelvis or right hip. Severe degenerative changes of the right hip. Femoral head flattening could be related to remote subchondral fracture secondary to avascular necrosis.         XR chest 1 view  Result Date: 1/5/2024  1.  Patchy opacities in the lateral right upper lobe which may represent an " acute infectious or inflammatory process. The lungs are mildly hyperinflated and hyperlucent suggestive of emphysematous change.         XR shoulder left 2+ views  Result Date: 1/5/2024  No acute findings.         CT maxillofacial bones wo IV contrast  Result Date: 1/5/2024  Soft tissue swelling anterior and lateral to the right mandibular body. There is no soft tissue air or localized fluid collection at this site.   1.5 cm round periapical lucency at the left mandibular molar compatible with periapical abscess.   Mild mucoperiosteal thickening at the floor of the left maxillary sinus with possible trace fluid. The remaining paranasal sinuses and mastoid air cells are clear and symmetrically aerated.   Slight bowing of the septum to the left. The ostiomeatal units remain patent bilaterally.       CT cervical spine wo IV contrast  Result Date: 1/5/2024  No sign of acute fracture or subluxation.   Dextro convexity of the cervical spine with multilevel hypertrophic facet arthrosis on the right.   No central canal narrowing.   Biapical pleural and parenchymal scarring with nodular cavitary apical opacities and bronchiectasis. This appearance has progressed in the interval from 08/09/2022.      CT head wo IV contrast  Result Date: 1/5/2024  No CT evidence of acute intracranial hemorrhage or mass effect.           Latest Reference Range & Units 01/05/24 11:20 01/05/24 11:23 01/05/24 11:56 01/05/24 13:07 01/05/24 15:26 01/06/24 08:12 01/07/24 07:26   GLUCOSE 74 - 99 mg/dL 106 (H)  108 (H)   92 94   SODIUM 136 - 145 mmol/L 140  140   140 139   POTASSIUM 3.5 - 5.3 mmol/L 3.5  3.5   3.6 3.4 (L)   CHLORIDE 98 - 107 mmol/L 105  105   106 108 (H)   Bicarbonate 21 - 32 mmol/L 21  20 (L)   25 25   Anion Gap 10 - 20 mmol/L 18  19   13 9 (L)   Blood Urea Nitrogen 6 - 23 mg/dL 11  11   9 6   Creatinine 0.50 - 1.05 mg/dL 0.61  0.63   0.48 (L) 0.44 (L)   EGFR >60 mL/min/1.73m*2 >90  >90   >90 >90   Calcium 8.6 - 10.3 mg/dL 8.4 (L)   8.4 (L)   8.3 (L) 7.8 (L)   Albumin 3.4 - 5.0 g/dL   3.4   3.1 (L)    Alkaline Phosphatase 33 - 136 U/L   58   47    ALT 7 - 45 U/L   16   13    AST 9 - 39 U/L   22   21    Bilirubin Total 0.0 - 1.2 mg/dL   0.4   0.3    Total Protein 6.4 - 8.2 g/dL   6.4   5.8 (L)    MAGNESIUM 1.60 - 2.40 mg/dL    1.51 (L)  1.59 (L)    Lactate 0.4 - 2.0 mmol/L 1.8         Troponin I, High Sensitivity 0 - 13 ng/L  94 (HH)  127 (HH) 170 (HH) 75 ()       [facility] Reference Range & Units 01/05/24 11:20 01/06/24 23:53 01/07/24 07:26   WBC 4.4 - 11.3 x10*3/uL 4.3 (L) 3.5 (L) 3.3 (L)   nRBC 0.0 - 0.0 /100 WBCs 0.0 0.0 0.0   RBC 4.00 - 5.20 x10*6/uL 4.30 3.76 (L) 3.74 (L)   HEMOGLOBIN 12.0 - 16.0 g/dL 11.7 (L) 10.4 (L) 10.4 (L)   HEMATOCRIT 36.0 - 46.0 % 39.1 33.5 (L) 34.0 (L)   MCV 80 - 100 fL 91 89 91   MCH 26.0 - 34.0 pg 27.2 27.7 27.8   MCHC 32.0 - 36.0 g/dL 29.9 (L) 31.0 (L) 30.6 (L)   RED CELL DISTRIBUTION WIDTH 11.5 - 14.5 % 12.4 12.3 12.3   Platelets 150 - 450 x10*3/uL 171 172 176     Assessment/Plan   Principal Problem:    Elevated troponin  Active Problems:    Syncope and collapse    Dental abscess    NSTEMI (non-ST elevated myocardial infarction) (CMS/HCC)    Rheumatoid arthritis involving multiple sites with positive rheumatoid factor (CMS/HCC)    Diverticulosis    History of cardiac arrest    Anxiety    #Elevated troponin  #History of cardiac arrest  #Syncope and collapse  #NSTEMI (non-ST elevated myocardial infarction) (CMS/HCC)  #Hypotension  #Hyperlipidemia  - Hx: VT/PE cardiac arrest post-surgery in 8/2022, ROSC quickly  - syncopal at home after n/v caused by oral abx  - appetite low lately  - trauma to face with fall  - troponin 94 > 127 > 170 > 75  - EKG with TWI, no RAMANDEEP  - denies CP at any time  - telemetry  - Cardiology consulted, appreciate recs  - transfer initiated to [facility] where her Cardiologist sees her  - heparin gtt continuously  - metop on hold for low BP, resume when able  - continue asa, statin,  ticagrelor  - orthostatic VS daily    #Dental abscess  - was on oral abx at home, nausea and vomiting ensued  - continue ampicillin-sulbactam  - Dr bryson consulted, appreciate recs  - ID consulted, appreciate recs    #Rheumatoid arthritis involving multiple sites with positive rheumatoid factor (CMS/HCC)  - has had biologic infusions in past, Orencia injections  - follows with Rhuematology  - uses Excedrin at home for pain  - tylenol, ibuprofen PRN pain    #Diverticulosis  - s/p colostomy since 8/2022  - stoma care and pouch changes as needed    #Anxiety    GI ppx: PPI  DVT ppx: heparin gtt    Fluids: as needed  Electrolytes: replace as needed  Nutrition: cardiac  Adjuncts: PIV    Code Status: full code  Pt requires inpatient stay at this time.  Total accumulated time spent face to face and not face to face preparing to see the patient, obtaining and reviewing separately obtained history; performing a medically appropriate examination and/or evaluation; counseling and educating the patient, family; ordering medications, tests, or procedures; referring and communicating with other health care professionals; documenting clinical information in the patient's medical record; independently interpreting results and communicating the results to the patient, family; and care coordination was 45 minutes.    SHANKAR Hernandez-CNP

## 2024-01-07 NOTE — CARE PLAN
Problem: Pain - Adult  Goal: Verbalizes/displays adequate comfort level or baseline comfort level  Outcome: Progressing     Problem: Safety - Adult  Goal: Free from fall injury  Outcome: Progressing     Problem: Discharge Planning  Goal: Discharge to home or other facility with appropriate resources  Outcome: Progressing     Problem: Chronic Conditions and Co-morbidities  Goal: Patient's chronic conditions and co-morbidity symptoms are monitored and maintained or improved  Outcome: Progressing   The patient's goals for the shift include      The clinical goals for the shift include Have heparin be theraputic    Over the shift, the patient did  make progress toward the following goals.

## 2024-01-07 NOTE — CONSULTS
Reason For Consult  Left mandibular swelling, dental abscess    History Of Present Illness  Maggie Carrington is a 63 y.o. female. About 2 weeks ago, she had swelling and pain of left lower gingiva. SHE saw a dentist on 01.02.204 and was recommended to use an oral antibiotic (Amoxicillin). It did not help much.    Currently, she is in [facility] , she had a cardiac issue. She is on IV unasyn 3gr q6hr.   Her CT scan shows expansion of left mandibular cortex at a focal area with a wide fenestration medially and laterally. Her WBC counts are at 3000s. The history supports an infectious process (sudden onset with pain), but lab and CT are concerning for a (possible) neoplastic process.    Plan:  1- continue IV antibiotics   2- I am planning to see the patient tomorrow   2- if there is no improvement with IV antibiotics, then needle aspiration culture and/or biopsy could be considered.      Past Medical History  She has a past medical history of Anxiety, Arthritis, Encounter for screening for other viral diseases (02/08/2016), Muscle weakness (generalized), Pain in unspecified joint, and Pneumonia, unspecified organism (11/10/2015).    Surgical History  She has a past surgical history that includes CT guided percutaneous peritoneal or retroperitoneal fluid collection drainage (01/31/2022); CT guided percutaneous peritoneal or retroperitoneal fluid collection drainage (08/10/2022); and Colon surgery.     Social History  She reports that she has never smoked. She has never been exposed to tobacco smoke. She has never used smokeless tobacco. She reports that she does not drink alcohol and does not use drugs.    Family History  Family History   Problem Relation Name Age of Onset    Diabetes Mother      Other (psoriatic arthritis) Father          Allergies  Duloxetine    Review of Systems  Left lower gingival swelling and pain.     Physical Exam  Phone call - virtual visit     Last Recorded Vitals  Blood pressure (!) 83/47, pulse  "62, temperature 36 °C (96.8 °F), temperature source Temporal, resp. rate 16, height 1.651 m (5' 5\"), weight 45.1 kg (99 lb 6.8 oz), SpO2 96 %.    Relevant Results    Interpreted By: Armen Villareal,   STUDY:  CT FACIAL BONES WO IV CONTRAST; 1/5/2024 11:51 am  INDICATION:  Signs/Symptoms:pain swelling right jaw.  COMPARISON:  None.  ACCESSION NUMBER(S):  [id number]  ORDERING CLINICIAN:  JAMISON FREEMAN  TECHNIQUE:  Contiguous axial CT sections are performed through the facial bones  and orbits and supplemented with coronal and sagittal reformatted  images.  Multiplanar 3D reformations are also generated and reviewed on a  separate workstation.  FINDINGS:  There is mucoperiosteal thickening of the floor of the left maxillary  sinus with possible small fluid level. The remaining paranasal  sinuses and mastoid air cells are clear and symmetrically aerated.  There is mild bowing of the nasal septum to the left. The ostiomeatal  units are patent bilaterally.  The visualized orbital contents are unremarkable. The medial and  inferior orbital walls are intact. There is no fluid collection or  intraorbital air.  There is some soft tissue swelling anterior to the mandible at the  midline and lateral to the right mandibular body. There is no soft  tissue air or organized fluid collection. There is no underlying bone  destruction or aggressive periosteal reaction. There is rounded  apical lucency within the left maxilla at the level of the left molar  with suspected violation of the medial cortex. This finding measures  1.5 cm in diameter. There is mild multifocal periapical lucencies on  the right.  IMPRESSION:  Soft tissue swelling anterior and lateral to the right mandibular  body. There is no soft tissue air or localized fluid collection at  this site.  1.5 cm round periapical lucency at the left mandibular molar  compatible with periapical abscess.  Mild mucoperiosteal thickening at the floor of the left " maxillary  sinus with possible trace fluid. The remaining paranasal sinuses and  mastoid air cells are clear and symmetrically aerated.  Slight bowing of the septum to the left. The ostiomeatal units remain  patent bilaterally.  Signed by: Armen Villareal 1/5/2024 12:26 PM  Dictation workstation: [id number]     Contains abnormal data CBC              Component  Ref Range & Units 07:26  (1/7/24) 1 d ago  (1/6/24) 2 d ago  (1/5/24) 8 mo ago  (4/25/23) 1 yr ago  (11/4/22) 1 yr ago  (8/19/22) 1 yr ago  (8/18/22)   WBC  4.4 - 11.3 x10*3/uL 3.3 Low  3.5 Low  4.3 Low  5.3 R 6.4 R 6.5 R 8.8 R          Assessment/Plan   Maggie Carrington is a 63 y.o. female. About 2 weeks ago, she had swelling and pain of left lower gingiva. SHE saw a dentist on 01.02.204 and was recommended to use an oral antibiotic (Amoxicillin). It did not help much.    Currently, she is in [facility] , she had a cardiac issue. She is on IV unasyn 3gr q6hr.   Her CT scan shows expansion of left mandibular cortex at a focal area with a wide fenestration medially and laterally. Her WBC counts are at 3000s. The history supports an infectious process (sudden onset with pain), but lab and CT are concerning for a (possible) neoplastic process.    Plan:  1- continue IV antibiotics   2- I am planning to see the patient tomorrow   2- if there is no improvement with IV antibiotics, then needle aspiration culture and/or biopsy could be considered.     Yon Head MD

## 2024-01-07 NOTE — CARE PLAN
The clinical goals for the shift include SBP will be >100 today  Pt's BP has been low all shift. STACIE Delacruz NP was notified thru day with results. HR was in 70's. Pt was in bed today due to low BP. Metoprolol on hold. Heparin drip Dcd. IV fluids started ay 75cc/hr. Has mild hip pain and getting Tylenol for it. Colostomy was changed today--draining loose green stool.

## 2024-01-08 ENCOUNTER — APPOINTMENT (OUTPATIENT)
Dept: CARDIOLOGY | Facility: HOSPITAL | Age: 64
DRG: 281 | End: 2024-01-08
Payer: COMMERCIAL

## 2024-01-08 LAB
ANION GAP SERPL CALC-SCNC: 13 MMOL/L (ref 10–20)
AORTIC VALVE PEAK VELOCITY: 1.01
AV PEAK GRADIENT: 4.1
AVA (PEAK VEL): 2.06
BUN SERPL-MCNC: 7 MG/DL (ref 6–23)
CALCIUM SERPL-MCNC: 7.9 MG/DL (ref 8.6–10.3)
CHLORIDE SERPL-SCNC: 113 MMOL/L (ref 98–107)
CO2 SERPL-SCNC: 21 MMOL/L (ref 21–32)
CREAT SERPL-MCNC: 0.47 MG/DL (ref 0.5–1.05)
EGFRCR SERPLBLD CKD-EPI 2021: >90 ML/MIN/1.73M*2
EJECTION FRACTION APICAL 4 CHAMBER: 60.1
EJECTION FRACTION: 62
ERYTHROCYTE [DISTWIDTH] IN BLOOD BY AUTOMATED COUNT: 12.8 % (ref 11.5–14.5)
GLUCOSE SERPL-MCNC: 87 MG/DL (ref 74–99)
HCT VFR BLD AUTO: 34 % (ref 36–46)
HGB BLD-MCNC: 10 G/DL (ref 12–16)
HOLD SPECIMEN: NORMAL
LEFT ATRIUM VOLUME AREA LENGTH INDEX BSA: 16.8
LEFT VENTRICLE INTERNAL DIMENSION DIASTOLE: 3.06 (ref 3.5–6)
LEFT VENTRICULAR OUTFLOW TRACT DIAMETER: 2
MCH RBC QN AUTO: 28.3 PG (ref 26–34)
MCHC RBC AUTO-ENTMCNC: 29.4 G/DL (ref 32–36)
MCV RBC AUTO: 96 FL (ref 80–100)
MITRAL VALVE E/A RATIO: 1.54
MITRAL VALVE E/E' RATIO: 6.25
NRBC BLD-RTO: 0 /100 WBCS (ref 0–0)
PLATELET # BLD AUTO: 159 X10*3/UL (ref 150–450)
POTASSIUM SERPL-SCNC: 4.1 MMOL/L (ref 3.5–5.3)
RBC # BLD AUTO: 3.53 X10*6/UL (ref 4–5.2)
RIGHT VENTRICLE FREE WALL PEAK S': 17.1
RIGHT VENTRICLE PEAK SYSTOLIC PRESSURE: 12.5
SODIUM SERPL-SCNC: 143 MMOL/L (ref 136–145)
TRICUSPID ANNULAR PLANE SYSTOLIC EXCURSION: 1.3
UFH PPP CHRO-ACNC: <0.1 IU/ML
WBC # BLD AUTO: 2.7 X10*3/UL (ref 4.4–11.3)

## 2024-01-08 PROCEDURE — 1200000002 HC GENERAL ROOM WITH TELEMETRY DAILY: Mod: IPSPLIT

## 2024-01-08 PROCEDURE — 93306 TTE W/DOPPLER COMPLETE: CPT | Performed by: INTERNAL MEDICINE

## 2024-01-08 PROCEDURE — 2500000001 HC RX 250 WO HCPCS SELF ADMINISTERED DRUGS (ALT 637 FOR MEDICARE OP): Mod: IPSPLIT | Performed by: NURSE PRACTITIONER

## 2024-01-08 PROCEDURE — 99232 SBSQ HOSP IP/OBS MODERATE 35: CPT | Performed by: NURSE PRACTITIONER

## 2024-01-08 PROCEDURE — 2500000001 HC RX 250 WO HCPCS SELF ADMINISTERED DRUGS (ALT 637 FOR MEDICARE OP): Mod: IPSPLIT

## 2024-01-08 PROCEDURE — 2500000004 HC RX 250 GENERAL PHARMACY W/ HCPCS (ALT 636 FOR OP/ED): Mod: IPSPLIT | Performed by: PHYSICIAN ASSISTANT

## 2024-01-08 PROCEDURE — 80048 BASIC METABOLIC PNL TOTAL CA: CPT | Mod: IPSPLIT | Performed by: NURSE PRACTITIONER

## 2024-01-08 PROCEDURE — 85027 COMPLETE CBC AUTOMATED: CPT | Mod: IPSPLIT | Performed by: NURSE PRACTITIONER

## 2024-01-08 PROCEDURE — C9113 INJ PANTOPRAZOLE SODIUM, VIA: HCPCS | Mod: IPSPLIT

## 2024-01-08 PROCEDURE — 2500000004 HC RX 250 GENERAL PHARMACY W/ HCPCS (ALT 636 FOR OP/ED): Mod: IPSPLIT

## 2024-01-08 PROCEDURE — 93306 TTE W/DOPPLER COMPLETE: CPT | Mod: IPSPLIT

## 2024-01-08 PROCEDURE — 2500000004 HC RX 250 GENERAL PHARMACY W/ HCPCS (ALT 636 FOR OP/ED): Mod: IPSPLIT | Performed by: NURSE PRACTITIONER

## 2024-01-08 PROCEDURE — 99232 SBSQ HOSP IP/OBS MODERATE 35: CPT | Performed by: OTOLARYNGOLOGY

## 2024-01-08 PROCEDURE — 36415 COLL VENOUS BLD VENIPUNCTURE: CPT | Mod: IPSPLIT | Performed by: INTERNAL MEDICINE

## 2024-01-08 PROCEDURE — 85520 HEPARIN ASSAY: CPT | Mod: IPSPLIT | Performed by: INTERNAL MEDICINE

## 2024-01-08 RX ORDER — OXYCODONE HYDROCHLORIDE 5 MG/1
5 TABLET ORAL EVERY 4 HOURS PRN
Status: DISCONTINUED | OUTPATIENT
Start: 2024-01-08 | End: 2024-01-10 | Stop reason: HOSPADM

## 2024-01-08 RX ADMIN — ASPIRIN 81 MG: 81 TABLET, COATED ORAL at 09:53

## 2024-01-08 RX ADMIN — ACETAMINOPHEN 650 MG: 325 TABLET ORAL at 22:04

## 2024-01-08 RX ADMIN — OXYCODONE HYDROCHLORIDE 5 MG: 5 TABLET ORAL at 22:02

## 2024-01-08 RX ADMIN — TICAGRELOR 90 MG: 90 TABLET ORAL at 09:54

## 2024-01-08 RX ADMIN — AMPICILLIN SODIUM AND SULBACTAM SODIUM 3 G: 2; 1 INJECTION, POWDER, FOR SOLUTION INTRAMUSCULAR; INTRAVENOUS at 09:54

## 2024-01-08 RX ADMIN — AMPICILLIN SODIUM AND SULBACTAM SODIUM 3 G: 2; 1 INJECTION, POWDER, FOR SOLUTION INTRAMUSCULAR; INTRAVENOUS at 22:02

## 2024-01-08 RX ADMIN — ATORVASTATIN CALCIUM 40 MG: 40 TABLET, FILM COATED ORAL at 22:03

## 2024-01-08 RX ADMIN — Medication 6 MG: at 22:03

## 2024-01-08 RX ADMIN — SODIUM CHLORIDE 75 ML/HR: 9 INJECTION, SOLUTION INTRAVENOUS at 19:53

## 2024-01-08 RX ADMIN — Medication 400 MG: at 09:54

## 2024-01-08 RX ADMIN — OXYCODONE HYDROCHLORIDE 5 MG: 5 TABLET ORAL at 09:53

## 2024-01-08 RX ADMIN — AMPICILLIN SODIUM AND SULBACTAM SODIUM 3 G: 2; 1 INJECTION, POWDER, FOR SOLUTION INTRAMUSCULAR; INTRAVENOUS at 15:23

## 2024-01-08 RX ADMIN — AZITHROMYCIN 500 MG: 500 INJECTION, POWDER, LYOPHILIZED, FOR SOLUTION INTRAVENOUS at 11:40

## 2024-01-08 RX ADMIN — OXYCODONE HYDROCHLORIDE 5 MG: 5 TABLET ORAL at 16:36

## 2024-01-08 RX ADMIN — SODIUM CHLORIDE 75 ML/HR: 9 INJECTION, SOLUTION INTRAVENOUS at 01:22

## 2024-01-08 RX ADMIN — TICAGRELOR 90 MG: 90 TABLET ORAL at 22:05

## 2024-01-08 RX ADMIN — PANTOPRAZOLE SODIUM 40 MG: 40 INJECTION, POWDER, FOR SOLUTION INTRAVENOUS at 06:24

## 2024-01-08 RX ADMIN — AMPICILLIN SODIUM AND SULBACTAM SODIUM 3 G: 2; 1 INJECTION, POWDER, FOR SOLUTION INTRAMUSCULAR; INTRAVENOUS at 03:06

## 2024-01-08 ASSESSMENT — COGNITIVE AND FUNCTIONAL STATUS - GENERAL
MOBILITY SCORE: 19
DRESSING REGULAR UPPER BODY CLOTHING: A LITTLE
DRESSING REGULAR LOWER BODY CLOTHING: A LITTLE
TOILETING: A LITTLE
CLIMB 3 TO 5 STEPS WITH RAILING: A LOT
MOVING TO AND FROM BED TO CHAIR: A LITTLE
WALKING IN HOSPITAL ROOM: A LITTLE
DAILY ACTIVITIY SCORE: 20
DRESSING REGULAR LOWER BODY CLOTHING: A LITTLE
MOBILITY SCORE: 19
STANDING UP FROM CHAIR USING ARMS: A LITTLE
STANDING UP FROM CHAIR USING ARMS: A LITTLE
WALKING IN HOSPITAL ROOM: A LITTLE
HELP NEEDED FOR BATHING: A LITTLE
TOILETING: A LITTLE
HELP NEEDED FOR BATHING: A LITTLE
CLIMB 3 TO 5 STEPS WITH RAILING: A LOT
MOVING TO AND FROM BED TO CHAIR: A LITTLE
DRESSING REGULAR UPPER BODY CLOTHING: A LITTLE
DAILY ACTIVITIY SCORE: 20

## 2024-01-08 ASSESSMENT — PAIN SCALES - GENERAL
PAINLEVEL_OUTOF10: 5 - MODERATE PAIN
PAINLEVEL_OUTOF10: 9
PAINLEVEL_OUTOF10: 8
PAINLEVEL_OUTOF10: 5 - MODERATE PAIN
PAINLEVEL_OUTOF10: 9
PAINLEVEL_OUTOF10: 7

## 2024-01-08 ASSESSMENT — PAIN DESCRIPTION - LOCATION
LOCATION: HIP
LOCATION: HIP

## 2024-01-08 ASSESSMENT — ACTIVITIES OF DAILY LIVING (ADL): LACK_OF_TRANSPORTATION: NO

## 2024-01-08 ASSESSMENT — PAIN - FUNCTIONAL ASSESSMENT
PAIN_FUNCTIONAL_ASSESSMENT: 0-10

## 2024-01-08 ASSESSMENT — PAIN DESCRIPTION - ORIENTATION
ORIENTATION: RIGHT;LEFT
ORIENTATION: RIGHT

## 2024-01-08 NOTE — PROGRESS NOTES
"Maggie Carrington is a 63 y.o. female on day 1 of admission presenting with Elevated troponin.    Subjective   She is resting in bed this morning, no complaints of chest pain, shortness of breath, cough, fever. She states she has chronic pain in her extremities and back, oxycodone offered for relief. We discussed transfer to [facility]-capable facility for cardiac care, she would like to stay here but understands the need for possible cardiac intervention and is willing to transfer for that reason.     Objective     Physical Exam  Constitutional:       General: She is not in acute distress.     Comments: Cachectic   HENT:      Head: Normocephalic.      Mouth/Throat:      Mouth: Mucous membranes are moist.   Eyes:      Extraocular Movements: Extraocular movements intact.      Pupils: Pupils are equal, round, and reactive to light.   Cardiovascular:      Rate and Rhythm: Normal rate and regular rhythm.      Pulses: Normal pulses.      Heart sounds: Normal heart sounds.   Pulmonary:      Breath sounds: Normal breath sounds. No wheezing or rales.      Comments: Poor effort   Abdominal:      General: Bowel sounds are normal. There is no distension.      Tenderness: There is no abdominal tenderness. There is no guarding.   Musculoskeletal:         General: Swelling, tenderness and deformity present.      Cervical back: Rigidity present.      Comments: Bilateral hands and feet with nodules, deformities in fingers and toes   Skin:     General: Skin is warm and dry.      Capillary Refill: Capillary refill takes less than 2 seconds.      Coloration: Skin is pale.   Neurological:      General: No focal deficit present.      Mental Status: She is alert and oriented to person, place, and time.   Psychiatric:         Mood and Affect: Mood normal.         Behavior: Behavior normal.      Last Recorded Vitals  Blood pressure 107/67, pulse 88, temperature 36.8 °C (98.2 °F), temperature source Temporal, resp. rate 16, height 1.651 m (5' 5\"), " weight 45.1 kg (99 lb 6.8 oz), SpO2 96 %.  Intake/Output last 3 Shifts:  I/O last 3 completed shifts:  In: 2673.2 (59.3 mL/kg) [P.O.:680; I.V.:1473.2 (32.7 mL/kg); IV Piggyback:520]  Out: 1990 (44.1 mL/kg) [Urine:890 (0.5 mL/kg/hr); Stool:1100]  Weight: 45.1 kg     Scheduled medications  ampicillin-sulbactam, 3 g, intravenous, q6h  aspirin, 81 mg, oral, Daily  atorvastatin, 40 mg, oral, Nightly  azithromycin, 500 mg, intravenous, q24h  magnesium oxide, 400 mg, oral, Daily  melatonin, 6 mg, oral, Daily  [Held by provider] metoprolol succinate XL, 50 mg, oral, BID  pantoprazole, 40 mg, oral, Daily before breakfast   Or  pantoprazole, 40 mg, intravenous, Daily before breakfast  ticagrelor, 90 mg, oral, BID    Continuous medications  sodium chloride 0.9%, 75 mL/hr, Last Rate: 75 mL/hr (01/08/24 0558)    PRN medications  PRN medications: acetaminophen **OR** acetaminophen **OR** acetaminophen, acetaminophen **OR** acetaminophen **OR** acetaminophen, benzocaine-menthoL, dextromethorphan-guaifenesin, guaiFENesin, oxyCODONE, polyethylene glycol    Relevant Results  ECG 12 lead  Result Date: 1/8/2024  Normal sinus rhythm     CT angio chest for pulmonary embolism  Result Date: 1/6/2024  No evidence of PE.   The lungs are hyperinflated.   Parenchymal patchy nodular infiltrates bilaterally. There are areas of cavitation within the infiltrates.   The left lower lobe consolidation on the previous exam and the left pleural fluid has largely resolved.       XR hip right with pelvis when performed 2 or 3 views  Result Date: 1/6/2024  No definite acute osseous injury the pelvis or right hip. Severe degenerative changes of the right hip. Femoral head flattening could be related to remote subchondral fracture secondary to avascular necrosis.         XR chest 1 view  Result Date: 1/5/2024  1.  Patchy opacities in the lateral right upper lobe which may represent an acute infectious or inflammatory process. The lungs are mildly  hyperinflated and hyperlucent suggestive of emphysematous change.         XR shoulder left 2+ views  Result Date: 1/5/2024  No acute findings.         CT maxillofacial bones wo IV contrast  Result Date: 1/5/2024  Soft tissue swelling anterior and lateral to the right mandibular body. There is no soft tissue air or localized fluid collection at this site.   1.5 cm round periapical lucency at the left mandibular molar compatible with periapical abscess.   Mild mucoperiosteal thickening at the floor of the left maxillary sinus with possible trace fluid. The remaining paranasal sinuses and mastoid air cells are clear and symmetrically aerated.   Slight bowing of the septum to the left. The ostiomeatal units remain patent bilaterally.       CT cervical spine wo IV contrast  Result Date: 1/5/2024  No sign of acute fracture or subluxation.   Dextro convexity of the cervical spine with multilevel hypertrophic facet arthrosis on the right.   No central canal narrowing. Biapical pleural and parenchymal scarring with nodular cavitary apical opacities and bronchiectasis. This appearance has progressed in the interval from 08/09/2022. The need for further workup should be determined clinically.       CT head wo IV contrast  Result Date: 1/5/2024  No CT evidence of acute intracranial hemorrhage or mass effect.        Latest Reference Range & Units 01/06/24 23:53 01/07/24 07:26 01/08/24 06:46   WBC 4.4 - 11.3 x10*3/uL 3.5 (L) 3.3 (L) 2.7 (L)   nRBC 0.0 - 0.0 /100 WBCs 0.0 0.0 0.0   RBC 4.00 - 5.20 x10*6/uL 3.76 (L) 3.74 (L) 3.53 (L)   HEMOGLOBIN 12.0 - 16.0 g/dL 10.4 (L) 10.4 (L) 10.0 (L)   HEMATOCRIT 36.0 - 46.0 % 33.5 (L) 34.0 (L) 34.0 (L)   MCV 80 - 100 fL 89 91 96   MCH 26.0 - 34.0 pg 27.7 27.8 28.3   MCHC 32.0 - 36.0 g/dL 31.0 (L) 30.6 (L) 29.4 (L)   RED CELL DISTRIBUTION WIDTH 11.5 - 14.5 % 12.3 12.3 12.8   Platelets 150 - 450 x10*3/uL 172 176 159      Latest Reference Range & Units 01/06/24 08:12 01/07/24 07:26   GLUCOSE 74  - 99 mg/dL 92 94   SODIUM 136 - 145 mmol/L 140 139   POTASSIUM 3.5 - 5.3 mmol/L 3.6 3.4 (L)   CHLORIDE 98 - 107 mmol/L 106 108 (H)   Bicarbonate 21 - 32 mmol/L 25 25   Anion Gap 10 - 20 mmol/L 13 9 (L)   Blood Urea Nitrogen 6 - 23 mg/dL 9 6   Creatinine 0.50 - 1.05 mg/dL 0.48 (L) 0.44 (L)   EGFR >60 mL/min/1.73m*2 >90 >90   Calcium 8.6 - 10.3 mg/dL 8.3 (L) 7.8 (L)      Latest Reference Range & Units 01/05/24 11:23 01/05/24 13:07 01/05/24 15:26 01/06/24 08:12   Troponin I, High Sensitivity 0 - 13 ng/L 94 (HH) 127 (HH) 170 (HH) 75 (HH)     Assessment/Plan   Principal Problem:    Elevated troponin  Active Problems:    Syncope and collapse    Dental abscess    NSTEMI (non-ST elevated myocardial infarction) (CMS/[facility])    Rheumatoid arthritis involving multiple sites with positive rheumatoid factor (CMS/[facility])    Diverticulosis    History of cardiac arrest    Anxiety    #Elevated troponin  #History of cardiac arrest  #Syncope and collapse  #NSTEMI (non-ST elevated myocardial infarction) (CMS/[facility])  #Hypotension  #Hyperlipidemia  - Hx: VT/PE cardiac arrest post-surgery in 8/2022, ROSC quickly  - syncopal at home after n/v caused by oral abx  - appetite low lately  - trauma to face with fall  - troponin 94 > 127 > 170 > 75  - EKG with TWI, no RAMANDEEP  - denies CP at any time  - telemetry  - Cardiology consulted, appreciate recs  - transfer [facility] her Cardiologist sees her   - transfer to [facility] today for possible intervention  - heparin gtt dc'd  - metop on hold for low BP, resume when able  - continue asa, statin, ticagrelor  - orthostatic VS daily     #Dental abscess  #Pulmonary infiltrate on CT   - was on oral abx at home, nausea and vomiting ensued  - continue ampicillin-sulbactam  - Dr bryson consulted, appreciate recs  - ID consulted, appreciate recs  - CXR: 1.  Patchy opacities in the lateral right upper lobe which may represent an acute infectious or inflammatory process. The lungs are mildly  hyperinflated and hyperlucent suggestive of emphysematous change.  - CT chest: Parenchymal patchy nodular infiltrates bilaterally. There are areas of cavitation within the infiltrates.    - added azithromycin for cavitary lesion vs infiltrate on CT chest, pt asymptomatic     #Rheumatoid arthritis involving multiple sites with positive rheumatoid factor (CMS/HCC)  - has had biologic infusions in past, Orencia injections  - follows with Rhuematology  - uses Excedrin at home for pain (cannot take with Brilinta)  - tylenol, ibuprofen, oxycodone PRN pain     #Diverticulosis  - s/p colostomy since 8/2022  - stoma care and pouch changes as needed     #Anxiety  - supportive care    GI ppx: PPI  DVT ppx: heparin gtt     Fluids: as needed  Electrolytes: replace as needed  Nutrition: cardiac  Adjuncts: PIV     Code Status: full code  Pt requires inpatient stay at this time.  Total accumulated time spent face to face and not face to face preparing to see the patient, obtaining and reviewing separately obtained history; performing a medically appropriate examination and/or evaluation; counseling and educating the patient, family; ordering medications, tests, or procedures; referring and communicating with other health care professionals; documenting clinical information in the patient's medical record; independently interpreting results and communicating the results to the patient, family; and care coordination was 35 minutes.     SHANKAR Hernandez-CNP

## 2024-01-08 NOTE — CARE PLAN
Dental abscess  Abnormal chest CT-nodular infiltrate    Continue Unasyn  ENT consult  Consider pulmonary consult  Blood cultures  Follow-up pending workup  Monitor temperature and WBC

## 2024-01-08 NOTE — PROGRESS NOTES
Maggie Carrington is a 63 y.o. female on day 1 of admission presenting with Elevated troponin.    Subjective   Sitting up in bed without complaints of CP, SOB or palpitations or anginal equivalent. She is resting comfortably in bed. Pending transfer to [facility].      Per H&P:  She follows with Dr. Alas and reports a hx of HF and PAF ~2021.  During a hospitalization for sepsis.  She does have a history of cardiac arrest -- I don't have any detailed records of this  MPI stress testing May 2023 showed an old septal infarct  She is disabled at baseline with arthritis, denies chest discomfort or unusual shortness of breath      Objective     Physical Exam  Constitutional:       General: She is not in acute distress.  HENT:      Right Ear: External ear normal.      Left Ear: External ear normal.      Nose: Nose normal.      Mouth/Throat:      Mouth: Mucous membranes are moist.      Pharynx: Oropharynx is clear.   Eyes:      Pupils: Pupils are equal, round, and reactive to light.   Neck:      Vascular: No carotid bruit.   Cardiovascular:      Rate and Rhythm: Normal rate and regular rhythm.      Pulses: Normal pulses.      Heart sounds: Normal heart sounds. No murmur heard.  Pulmonary:      Effort: Pulmonary effort is normal. No respiratory distress.      Breath sounds: Normal breath sounds.   Abdominal:      General: Abdomen is flat. Bowel sounds are normal. There is no distension.      Palpations: Abdomen is soft.      Tenderness: There is no abdominal tenderness.   Musculoskeletal:         General: Deformity present. No swelling.      Cervical back: Normal range of motion and neck supple.      Comments: Arthritic deformities to fingers and toes   Skin:     General: Skin is warm.   Neurological:      Mental Status: She is alert and oriented to person, place, and time. Mental status is at baseline.   Psychiatric:         Mood and Affect: Mood normal.         Behavior: Behavior normal.         Last Recorded  "Vitals  Blood pressure 107/67, pulse 88, temperature 36.8 °C (98.2 °F), temperature source Temporal, resp. rate 16, height 1.651 m (5' 5\"), weight 45.1 kg (99 lb 6.8 oz), SpO2 96 %.  Intake/Output last 3 Shifts:  I/O last 3 completed shifts:  In: 2673.2 (59.3 mL/kg) [P.O.:680; I.V.:1473.2 (32.7 mL/kg); IV Piggyback:520]  Out: 1990 (44.1 mL/kg) [Urine:890 (0.5 mL/kg/hr); Stool:1100]  Weight: 45.1 kg     Relevant Results  Scheduled medications  ampicillin-sulbactam, 3 g, intravenous, q6h  aspirin, 81 mg, oral, Daily  atorvastatin, 40 mg, oral, Nightly  azithromycin, 500 mg, intravenous, q24h  magnesium oxide, 400 mg, oral, Daily  melatonin, 6 mg, oral, Daily  [Held by provider] metoprolol succinate XL, 50 mg, oral, BID  pantoprazole, 40 mg, oral, Daily before breakfast   Or  pantoprazole, 40 mg, intravenous, Daily before breakfast  ticagrelor, 90 mg, oral, BID      Continuous medications  sodium chloride 0.9%, 75 mL/hr, Last Rate: 75 mL/hr (01/08/24 0558)      PRN medications  PRN medications: acetaminophen **OR** acetaminophen **OR** acetaminophen, acetaminophen **OR** acetaminophen **OR** acetaminophen, benzocaine-menthoL, dextromethorphan-guaifenesin, guaiFENesin, oxyCODONE, polyethylene glycol    Results for orders placed or performed during the hospital encounter of 01/05/24 (from the past 24 hour(s))   Blood Culture    Specimen: Peripheral Venipuncture; Blood culture   Result Value Ref Range    Blood Culture Loaded on Instrument - Culture in progress    Blood Culture    Specimen: Peripheral Venipuncture; Blood culture   Result Value Ref Range    Blood Culture Loaded on Instrument - Culture in progress    Light Blue Top   Result Value Ref Range    Extra Tube Hold for add-ons.    Gray Top   Result Value Ref Range    Extra Tube Hold for add-ons.    Heparin Assay   Result Value Ref Range    Heparin Unfractionated <0.1 See Comment Below for Therapeutic Ranges IU/mL   Lavender Top   Result Value Ref Range    Extra " Tube Hold for add-ons.    SST TOP   Result Value Ref Range    Extra Tube Hold for add-ons.    PST Top   Result Value Ref Range    Extra Tube Hold for add-ons.      ECG 12 lead    Result Date: 1/8/2024  Normal sinus rhythm Normal ECG When compared with ECG of 01-AUG-2022 20:29, Previous ECG has undetermined rhythm, needs review T wave amplitude has decreased in Lateral leads    ECG 12 lead    Result Date: 1/8/2024  Normal sinus rhythm Nonspecific T wave abnormality Abnormal ECG When compared with ECG of 05-JAN-2024 12:52, (unconfirmed) ST now depressed in Anterior leads Nonspecific T wave abnormality now evident in Inferior leads T wave inversion now evident in Anterior leads    CT angio chest for pulmonary embolism    Result Date: 1/6/2024  Interpreted By:  Estrella Hewitt, STUDY: CT ANGIO CHEST FOR PULMONARY EMBOLISM;  1/6/2024 3:01 pm   INDICATION: Signs/Symptoms:elevated d-dimer.   COMPARISON: 08/09/2022   ACCESSION NUMBER(S): [id number]   ORDERING CLINICIAN: ELIANA RAVI   TECHNIQUE: Serial axial CT images of the chest obtained following the intravenous administration 60 mL Omnipaque 350. Sagittal, coronal and MIP reconstructions were generated.   FINDINGS: VESSELS: There is good opacification of the pulmonary arteries. There are no obvious pulmonary artery emboli.   The aorta and cava unremarkable.   HEART:  The heart is normal in size.   MEDIASTINUM AND GEMMA: There is no significant mediastinal or hilar lymphadenopathy.   LUNG, PLEURA, AND LARGE AIRWAYS: The lungs are hyperinflated. There are bilateral patchy nodular and cavitary infiltrates. There is no obvious pleural fluid.   CHEST WALL AND LOWER NECK: The thyroid as visualized is unremarkable. There is minimal subcutaneous fat.   BONES: Patient is kyphotic.   UPPER ABDOMEN: Unremarkable   COMPARISON TO THE PRIOR EXAM: There is less consolidation at the left base there is resolution of the left pleural effusion. There however has been progression of upper  lobe infiltrates.       No evidence of PE.   The lungs are hyperinflated.   Parenchymal patchy nodular infiltrates bilaterally. There are areas of cavitation within the infiltrates.   The left lower lobe consolidation on the previous exam and the left pleural fluid has largely resolved.   Signed by: Estrella Hewitt 1/6/2024 3:56 PM Dictation workstation:   [id number]    XR hip right with pelvis when performed 2 or 3 views    Result Date: 1/6/2024  Interpreted By:  Benton Camargo, STUDY: XR HIP RIGHT WITH PELVIS WHEN PERFORMED 2 OR 3 VIEWS;  1/6/2024 8:23 am   INDICATION: Signs/Symptoms:fall.   COMPARISON: None.   ACCESSION NUMBER(S): [id number]   ORDERING CLINICIAN: KHLOE HORNER   FINDINGS: No acute fracture identified. There is severe joint space narrowing of the right hip with femoral head flattening.       No definite acute osseous injury the pelvis or right hip. Severe degenerative changes of the right hip. Femoral head flattening could be related to remote subchondral fracture secondary to avascular necrosis.     Signed by: Benton Camargo 1/6/2024 8:28 AM Dictation workstation:   [id number]    XR chest 1 view    Result Date: 1/5/2024  Interpreted By:  Rodrick Ignacio, STUDY: XR CHEST 1 VIEW;  1/5/2024 8:15 pm   INDICATION: Signs/Symptoms:cp.   COMPARISON: Chest radiograph dated 08/15/2022. Chest CT dated 09/20/2022.   ACCESSION NUMBER(S): [id number]   ORDERING CLINICIAN: JAMISON FREEMAN   FINDINGS:   CARDIOMEDIASTINAL SILHOUETTE: Cardiomediastinal silhouette is normal in size and configuration.   LUNGS/PLEURA: There are patchy opacities in the lateral right upper lobe, which may represent an acute infectious or inflammatory process. Hyperinflated and hyperlucent lungs. There are no consolidations.There are no pleural effusions. There is no demonstrated pneumothorax.     BONES: No evidence of acute osseous abnormality.       1.  Patchy opacities in the lateral right upper lobe which may represent an acute  infectious or inflammatory process. The lungs are mildly hyperinflated and hyperlucent suggestive of emphysematous change.     Signed by: Rodrick Ignacio 1/5/2024 8:22 PM Dictation workstation:   [id number]    XR shoulder left 2+ views    Result Date: 1/5/2024  Interpreted By:  Maynor Bryson, STUDY: XR SHOULDER LEFT 2+ VIEWS; ;  1/5/2024 3:47 pm   INDICATION: Signs/Symptoms:fall pain.   COMPARISON: None.   ACCESSION NUMBER(S): [id number]   ORDERING CLINICIAN: JAMISON FREEMAN   FINDINGS: No fracture or dislocation. No joint effusion. AC joint adrenal gland joint are normal. Lung apex is clear.       No acute findings.     MACRO: None   Signed by: Maynor Bryson 1/5/2024 4:09 PM Dictation workstation:   [id number]    CT maxillofacial bones wo IV contrast    Result Date: 1/5/2024  Interpreted By:  Armen Villareal, STUDY: CT FACIAL BONES WO IV CONTRAST;  1/5/2024 11:51 am   INDICATION: Signs/Symptoms:pain swelling right jaw.   COMPARISON: None.   ACCESSION NUMBER(S): [id number]   ORDERING CLINICIAN: JAMISON FREEMAN   TECHNIQUE: Contiguous axial CT sections are performed through the facial bones and orbits and supplemented with coronal and sagittal reformatted images.   Multiplanar 3D reformations are also generated and reviewed on a separate workstation.   FINDINGS: There is mucoperiosteal thickening of the floor of the left maxillary sinus with possible small fluid level. The remaining paranasal sinuses and mastoid air cells are clear and symmetrically aerated.   There is mild bowing of the nasal septum to the left. The ostiomeatal units are patent bilaterally.   The visualized orbital contents are unremarkable. The medial and inferior orbital walls are intact. There is no fluid collection or intraorbital air.   There is some soft tissue swelling anterior to the mandible at the midline and lateral to the right mandibular body. There is no soft tissue air or organized fluid collection. There is no underlying bone  destruction or aggressive periosteal reaction. There is rounded apical lucency within the left maxilla at the level of the left molar with suspected violation of the medial cortex. This finding measures 1.5 cm in diameter. There is mild multifocal periapical lucencies on the right.       Soft tissue swelling anterior and lateral to the right mandibular body. There is no soft tissue air or localized fluid collection at this site.   1.5 cm round periapical lucency at the left mandibular molar compatible with periapical abscess.   Mild mucoperiosteal thickening at the floor of the left maxillary sinus with possible trace fluid. The remaining paranasal sinuses and mastoid air cells are clear and symmetrically aerated.   Slight bowing of the septum to the left. The ostiomeatal units remain patent bilaterally.   Signed by: Armen Villareal 1/5/2024 12:26 PM Dictation workstation:   [id number]    CT 3D reconstruction    Result Date: 1/5/2024  Interpreted By:  Armen Villareal, STUDY: CT FACIAL BONES WO IV CONTRAST;  1/5/2024 11:51 am   INDICATION: Signs/Symptoms:pain swelling right jaw.   COMPARISON: None.   ACCESSION NUMBER(S): [id number]   ORDERING CLINICIAN: JAMISON FREEMAN   TECHNIQUE: Contiguous axial CT sections are performed through the facial bones and orbits and supplemented with coronal and sagittal reformatted images.   Multiplanar 3D reformations are also generated and reviewed on a separate workstation.   FINDINGS: There is mucoperiosteal thickening of the floor of the left maxillary sinus with possible small fluid level. The remaining paranasal sinuses and mastoid air cells are clear and symmetrically aerated.   There is mild bowing of the nasal septum to the left. The ostiomeatal units are patent bilaterally.   The visualized orbital contents are unremarkable. The medial and inferior orbital walls are intact. There is no fluid collection or intraorbital air.   There is some soft tissue swelling  anterior to the mandible at the midline and lateral to the right mandibular body. There is no soft tissue air or organized fluid collection. There is no underlying bone destruction or aggressive periosteal reaction. There is rounded apical lucency within the left maxilla at the level of the left molar with suspected violation of the medial cortex. This finding measures 1.5 cm in diameter. There is mild multifocal periapical lucencies on the right.       Soft tissue swelling anterior and lateral to the right mandibular body. There is no soft tissue air or localized fluid collection at this site.   1.5 cm round periapical lucency at the left mandibular molar compatible with periapical abscess.   Mild mucoperiosteal thickening at the floor of the left maxillary sinus with possible trace fluid. The remaining paranasal sinuses and mastoid air cells are clear and symmetrically aerated.   Slight bowing of the septum to the left. The ostiomeatal units remain patent bilaterally.   Signed by: Armen Villareal 1/5/2024 12:26 PM Dictation workstation:   [id number]    CT cervical spine wo IV contrast    Result Date: 1/5/2024  Interpreted By:  Armen Villareal, STUDY: CT CERVICAL SPINE WO IV CONTRAST;  1/5/2024 11:51 am   INDICATION: Signs/Symptoms:pain fall.   COMPARISON: None.   ACCESSION NUMBER(S): [id number]   ORDERING CLINICIAN: JAMISON FREEMAN   TECHNIQUE: Contiguous axial CT sections are performed from the skullbase to the upper thoracic spine and supplemented with coronal and sagittal reformatted images.   FINDINGS: There is mild dextro convexity of the cervical spine. The cervical vertebral body AP alignment is within normal limits. The facet joints align normally.   The cervical vertebral body heights are maintained. There is no sign of acute cervical spine fracture. The C1 ring is intact. There is no bone destruction or aggressive periosteal reaction. No lytic or blastic lesion is identified. The surrounding  visualized osseous structures are intact.   There is no evidence of central canal narrowing.   There is multilevel hypertrophic facet arthrosis on the right with at least mild narrowing of the right C3-4 and C4-5 neural foramen.   There is biapical pleural and parenchymal scarring. There are nodular opacities with cavitation and bronchiectasis in the lung apices. The remaining surrounding soft tissues are unremarkable.       No sign of acute fracture or subluxation.   Dextro convexity of the cervical spine with multilevel hypertrophic facet arthrosis on the right.   No central canal narrowing.   Biapical pleural and parenchymal scarring with nodular cavitary apical opacities and bronchiectasis. This appearance has progressed in the interval from 08/09/2022. The need for further workup should be determined clinically.   Signed by: Armen Villareal 1/5/2024 12:20 PM Dictation workstation:   [id number]    CT head wo IV contrast    Result Date: 1/5/2024  Interpreted By:  Armen Villareal, STUDY: CT HEAD WO IV CONTRAST;  1/5/2024 11:51 am   INDICATION: Signs/Symptoms:fall syncope.   COMPARISON: None.   ACCESSION NUMBER(S): [id number]   ORDERING CLINICIAN: JAMISON FREEMAN   TECHNIQUE: Contiguous unenhanced axial CT sections are performed from the skull base to the vertex.   FINDINGS: The osseous structures are intact. The visualized portions of the paranasal sinuses and mastoid air cells are clear. There is mucoperiosteal thickening at the floor of the left maxillary sinus.   The cortical sulci and CSF spaces are symmetric in appearance. There is no sign of parenchymal hematoma or dense extra-axial fluid collection. There is no mass effect or midline shift. The gray matter/white-matter differentiation is preserved.       No CT evidence of acute intracranial hemorrhage or mass effect.   Signed by: Armen Villareal 1/5/2024 12:16 PM Dictation workstation:   [id number]        Assessment/Plan   Principal  Problem:    Elevated troponin  Active Problems:    Syncope and collapse    Dental abscess    NSTEMI (non-ST elevated myocardial infarction) (CMS/HCC)    Rheumatoid arthritis involving multiple sites with positive rheumatoid factor (CMS/HCC)    Diverticulosis    History of cardiac arrest    Anxiety    63 YOF with NSTEMI vs Type II MI in the setting of dental abscess    PAF at home PTA  Palpitations/Tachycardia during acute illness  Dental infection on ATB       Plan:  Complete ACS protocol  Continue GDMT as follows  -ASA 81 mg daily   -Brilinta loading dose followed by 90 mg BID   -Atorvastatin 40 mg HS   3. Pending transfer to facility with maxillofacial as well as higher level of cardiac care to determine ischemic evaluation     Thank you for the consult   Please call or message with questions or concerns   The case was discussed with SHANKAR Watson-CNP       SHANKAR Rees-CNP

## 2024-01-08 NOTE — CARE PLAN
The patient's goals for the shift include  Pt will state pain is at tolerable level throughout shift.     The clinical goals for the shift include Pt will state controlled pain throughout shift.    Pt remained safe and stable throughout shift. VS were stable and pt received oxy for pain. IV abx were administered throughout shift and pt tolerated well. Pt will be transferred to [facility] for heart cath when bed is available. No other complaints. Pt resting in bed with call light within reach.

## 2024-01-08 NOTE — CARE PLAN
The patient's goals for the shift include      The clinical goals for the shift include SBP will be >100 today    Patient had uneventful evening was able to rest most of the nihgt with no complaints

## 2024-01-08 NOTE — PROGRESS NOTES
Patient is day 1 admission for syncope, NSTEMI, and dental abscess.  Cardiology, ID, and ENT following.      Discussed plan of care in interdisciplinary rounds,  continue IV ATB.  Transfer request placed for [facility] for further care and management from patients regular cardiologist.      Met with patient at bedside to discuss discharge planning. Patient is A&OX3 from home with her spouse, they reside in a one story home with ramp access.  Patients spouse is currently admitted to [facility] for skilled rehab.   Prior to admission patient was independent in all ADL's, uses a walker, rollator, wheelchair, and shower chair.   Patient has a cleaning lady that comes once every two weeks.  Discussed home going needs, patient is requesting [facility] for RN/PT/OT services.   Provider updated that internal home care referral will need placed on discharge.      Home with [facility] vs Transfer to [facility]

## 2024-01-09 LAB
ANION GAP SERPL CALC-SCNC: 12 MMOL/L (ref 10–20)
BUN SERPL-MCNC: 5 MG/DL (ref 6–23)
CALCIUM SERPL-MCNC: 7.9 MG/DL (ref 8.6–10.3)
CHLORIDE SERPL-SCNC: 110 MMOL/L (ref 98–107)
CO2 SERPL-SCNC: 22 MMOL/L (ref 21–32)
CREAT SERPL-MCNC: 0.46 MG/DL (ref 0.5–1.05)
EGFRCR SERPLBLD CKD-EPI 2021: >90 ML/MIN/1.73M*2
ERYTHROCYTE [DISTWIDTH] IN BLOOD BY AUTOMATED COUNT: 12 % (ref 11.5–14.5)
GLUCOSE SERPL-MCNC: 95 MG/DL (ref 74–99)
HCT VFR BLD AUTO: 37.4 % (ref 36–46)
HGB BLD-MCNC: 10.5 G/DL (ref 12–16)
HOLD SPECIMEN: NORMAL
MCH RBC QN AUTO: 27.6 PG (ref 26–34)
MCHC RBC AUTO-ENTMCNC: 28.1 G/DL (ref 32–36)
MCV RBC AUTO: 98 FL (ref 80–100)
NRBC BLD-RTO: 0 /100 WBCS (ref 0–0)
PLATELET # BLD AUTO: 162 X10*3/UL (ref 150–450)
POTASSIUM SERPL-SCNC: 3.8 MMOL/L (ref 3.5–5.3)
RBC # BLD AUTO: 3.81 X10*6/UL (ref 4–5.2)
SODIUM SERPL-SCNC: 140 MMOL/L (ref 136–145)
WBC # BLD AUTO: 4.6 X10*3/UL (ref 4.4–11.3)

## 2024-01-09 PROCEDURE — 36415 COLL VENOUS BLD VENIPUNCTURE: CPT | Mod: IPSPLIT | Performed by: NURSE PRACTITIONER

## 2024-01-09 PROCEDURE — 2500000001 HC RX 250 WO HCPCS SELF ADMINISTERED DRUGS (ALT 637 FOR MEDICARE OP): Mod: IPSPLIT

## 2024-01-09 PROCEDURE — 2500000001 HC RX 250 WO HCPCS SELF ADMINISTERED DRUGS (ALT 637 FOR MEDICARE OP): Mod: IPSPLIT | Performed by: NURSE PRACTITIONER

## 2024-01-09 PROCEDURE — 1200000002 HC GENERAL ROOM WITH TELEMETRY DAILY: Mod: IPSPLIT

## 2024-01-09 PROCEDURE — 2500000004 HC RX 250 GENERAL PHARMACY W/ HCPCS (ALT 636 FOR OP/ED): Mod: IPSPLIT

## 2024-01-09 PROCEDURE — 99232 SBSQ HOSP IP/OBS MODERATE 35: CPT | Performed by: NURSE PRACTITIONER

## 2024-01-09 PROCEDURE — 2500000004 HC RX 250 GENERAL PHARMACY W/ HCPCS (ALT 636 FOR OP/ED): Mod: IPSPLIT | Performed by: PHYSICIAN ASSISTANT

## 2024-01-09 PROCEDURE — 2500000004 HC RX 250 GENERAL PHARMACY W/ HCPCS (ALT 636 FOR OP/ED): Mod: IPSPLIT | Performed by: NURSE PRACTITIONER

## 2024-01-09 PROCEDURE — 85027 COMPLETE CBC AUTOMATED: CPT | Mod: IPSPLIT | Performed by: NURSE PRACTITIONER

## 2024-01-09 PROCEDURE — 80048 BASIC METABOLIC PNL TOTAL CA: CPT | Mod: IPSPLIT | Performed by: NURSE PRACTITIONER

## 2024-01-09 RX ORDER — SODIUM CHLORIDE 9 MG/ML
INJECTION, SOLUTION INTRAVENOUS
Status: DISCONTINUED
Start: 2024-01-09 | End: 2024-01-09 | Stop reason: WASHOUT

## 2024-01-09 RX ORDER — METOPROLOL TARTRATE 25 MG/1
25 TABLET, FILM COATED ORAL 2 TIMES DAILY
Status: DISCONTINUED | OUTPATIENT
Start: 2024-01-09 | End: 2024-01-10 | Stop reason: HOSPADM

## 2024-01-09 RX ADMIN — AMPICILLIN SODIUM AND SULBACTAM SODIUM 3 G: 2; 1 INJECTION, POWDER, FOR SOLUTION INTRAMUSCULAR; INTRAVENOUS at 22:23

## 2024-01-09 RX ADMIN — SODIUM CHLORIDE 75 ML/HR: 9 INJECTION, SOLUTION INTRAVENOUS at 15:00

## 2024-01-09 RX ADMIN — ACETAMINOPHEN 650 MG: 325 TABLET ORAL at 14:59

## 2024-01-09 RX ADMIN — TICAGRELOR 90 MG: 90 TABLET ORAL at 09:16

## 2024-01-09 RX ADMIN — AMPICILLIN SODIUM AND SULBACTAM SODIUM 3 G: 2; 1 INJECTION, POWDER, FOR SOLUTION INTRAMUSCULAR; INTRAVENOUS at 03:05

## 2024-01-09 RX ADMIN — AMPICILLIN SODIUM AND SULBACTAM SODIUM 3 G: 2; 1 INJECTION, POWDER, FOR SOLUTION INTRAMUSCULAR; INTRAVENOUS at 15:03

## 2024-01-09 RX ADMIN — AMPICILLIN SODIUM AND SULBACTAM SODIUM 3 G: 2; 1 INJECTION, POWDER, FOR SOLUTION INTRAMUSCULAR; INTRAVENOUS at 09:16

## 2024-01-09 RX ADMIN — Medication 400 MG: at 09:16

## 2024-01-09 RX ADMIN — METOPROLOL TARTRATE 25 MG: 25 TABLET, FILM COATED ORAL at 22:25

## 2024-01-09 RX ADMIN — TICAGRELOR 90 MG: 90 TABLET ORAL at 22:24

## 2024-01-09 RX ADMIN — AZITHROMYCIN 500 MG: 500 INJECTION, POWDER, LYOPHILIZED, FOR SOLUTION INTRAVENOUS at 11:09

## 2024-01-09 RX ADMIN — ATORVASTATIN CALCIUM 40 MG: 40 TABLET, FILM COATED ORAL at 22:25

## 2024-01-09 RX ADMIN — Medication 6 MG: at 22:25

## 2024-01-09 RX ADMIN — PANTOPRAZOLE SODIUM 40 MG: 40 TABLET, DELAYED RELEASE ORAL at 06:34

## 2024-01-09 RX ADMIN — ACETAMINOPHEN 650 MG: 325 TABLET ORAL at 22:32

## 2024-01-09 RX ADMIN — METOPROLOL TARTRATE 25 MG: 25 TABLET, FILM COATED ORAL at 15:00

## 2024-01-09 RX ADMIN — ASPIRIN 81 MG: 81 TABLET, COATED ORAL at 09:16

## 2024-01-09 ASSESSMENT — COGNITIVE AND FUNCTIONAL STATUS - GENERAL
STANDING UP FROM CHAIR USING ARMS: A LITTLE
TOILETING: A LITTLE
TURNING FROM BACK TO SIDE WHILE IN FLAT BAD: A LITTLE
DRESSING REGULAR UPPER BODY CLOTHING: A LITTLE
CLIMB 3 TO 5 STEPS WITH RAILING: A LOT
MOBILITY SCORE: 16
MOVING FROM LYING ON BACK TO SITTING ON SIDE OF FLAT BED WITH BEDRAILS: A LITTLE
PERSONAL GROOMING: A LITTLE
MOVING TO AND FROM BED TO CHAIR: A LITTLE
DAILY ACTIVITIY SCORE: 19
HELP NEEDED FOR BATHING: A LITTLE
WALKING IN HOSPITAL ROOM: A LOT
DRESSING REGULAR LOWER BODY CLOTHING: A LITTLE

## 2024-01-09 ASSESSMENT — PAIN DESCRIPTION - LOCATION
LOCATION: HEAD
LOCATION: KNEE
LOCATION: HEAD
LOCATION: HEAD

## 2024-01-09 ASSESSMENT — PAIN SCALES - GENERAL
PAINLEVEL_OUTOF10: 1
PAINLEVEL_OUTOF10: 3
PAINLEVEL_OUTOF10: 0 - NO PAIN
PAINLEVEL_OUTOF10: 1
PAINLEVEL_OUTOF10: 3

## 2024-01-09 ASSESSMENT — PAIN - FUNCTIONAL ASSESSMENT
PAIN_FUNCTIONAL_ASSESSMENT: 0-10

## 2024-01-09 NOTE — CARE PLAN
The patient's goals for the shift include      The clinical goals for the shift include Pt will state controlled pain throughout shift.    Over the shift, the patient did not make progress toward the following goals. Barriers to progression include ineffective pain management. Recommendations to address these barriers include evaluate pain throughout the shift.

## 2024-01-09 NOTE — PROGRESS NOTES
Discussed plan of care in interdisciplinary rounds,  patient is accepted for transfer to [facility] for cardiology management.   Awaiting available bed for transfer at this time.

## 2024-01-09 NOTE — PROGRESS NOTES
Maggie Carrington is a 63 y.o. female   I have seen and evaluated the patient this evening.  There is mild swelling at left inferior part of mandible, which is palpable intraorally, below the gingiva, at left gingivobuccal sulcus. Mild tenderness.  I have not noticed any dental or gingival infection on that side.      My impression, this could be an odontogenic cyst, or a low-grade left mandibular neoplastic process.  There could be coexisting mild infection, so I recommend to continue the antibiotics.    Considering her cardiac problem is at the forefront, I recommend to continue and finalize her cardiac treatment first. She can follow-up with me, or with another ENT as an outpatient to plan a biopsy. Alternatively, she can follow-up with oral maxillofacial surgery; or if she is transferred to [facility], a consultation from ENT - head and neck surgery team could be requested.    Thank you for letting me participate in this patient's care.    Yon Head MD  Otolaryngology

## 2024-01-09 NOTE — PROGRESS NOTES
Maggie Carrington is a 63 y.o. female on day 2 of admission presenting with Elevated troponin.    Subjective   Sitting up in bed. Complains of all over arthritic pain. Given stronger pain medication overnight and had some nausea associated with it. Otherwise without complaints of CP, SOB or palpitations or anginal equivalent. She is resting comfortably in bed. Pending transfer to higher level of care.      Per H&P:  She follows with Dr. Alas and reports a hx of HF and PAF ~2021.  During a hospitalization for sepsis.  She does have a history of cardiac arrest -- I don't have any detailed records of this  MPI stress testing May 2023 showed an old septal infarct  She is disabled at baseline with arthritis, denies chest discomfort or unusual shortness of breath      Objective     Physical Exam  Constitutional:       General: She is not in acute distress.  HENT:      Right Ear: External ear normal.      Left Ear: External ear normal.      Nose: Nose normal.      Mouth/Throat:      Mouth: Mucous membranes are moist.      Pharynx: Oropharynx is clear.   Eyes:      Pupils: Pupils are equal, round, and reactive to light.   Neck:      Vascular: No carotid bruit.   Cardiovascular:      Rate and Rhythm: Normal rate and regular rhythm.      Pulses: Normal pulses.      Heart sounds: Normal heart sounds. No murmur heard.  Pulmonary:      Effort: Pulmonary effort is normal. No respiratory distress.      Breath sounds: Normal breath sounds.   Abdominal:      General: Abdomen is flat. Bowel sounds are normal. There is no distension.      Palpations: Abdomen is soft.      Tenderness: There is no abdominal tenderness.   Musculoskeletal:         General: Deformity present. No swelling.      Cervical back: Normal range of motion and neck supple.      Comments: Arthritic deformities to fingers and toes   Skin:     General: Skin is warm.   Neurological:      Mental Status: She is alert and oriented to person, place, and time. Mental status  "is at baseline.   Psychiatric:         Mood and Affect: Mood normal.         Behavior: Behavior normal.         Last Recorded Vitals  Blood pressure 112/71, pulse 83, temperature 36.5 °C (97.7 °F), temperature source Temporal, resp. rate 18, height 1.651 m (5' 5\"), weight 45.1 kg (99 lb 6.8 oz), SpO2 96 %.  Intake/Output last 3 Shifts:  I/O last 3 completed shifts:  In: 3695 (81.9 mL/kg) [P.O.:720; I.V.:2225 (49.3 mL/kg); IV Piggyback:750]  Out: 1575 (34.9 mL/kg) [Urine:1000 (0.6 mL/kg/hr); Stool:575]  Weight: 45.1 kg     Relevant Results  Scheduled medications  ampicillin-sulbactam, 3 g, intravenous, q6h  aspirin, 81 mg, oral, Daily  atorvastatin, 40 mg, oral, Nightly  azithromycin, 500 mg, intravenous, q24h  magnesium oxide, 400 mg, oral, Daily  melatonin, 6 mg, oral, Daily  [Held by provider] metoprolol succinate XL, 50 mg, oral, BID  metoprolol tartrate, 25 mg, oral, BID  pantoprazole, 40 mg, oral, Daily before breakfast   Or  pantoprazole, 40 mg, intravenous, Daily before breakfast  ticagrelor, 90 mg, oral, BID      Continuous medications  sodium chloride 0.9%, 75 mL/hr, Last Rate: 75 mL/hr (01/09/24 0019)      PRN medications  PRN medications: acetaminophen **OR** acetaminophen **OR** acetaminophen, acetaminophen **OR** acetaminophen **OR** acetaminophen, benzocaine-menthoL, dextromethorphan-guaifenesin, guaiFENesin, oxyCODONE, polyethylene glycol    Results for orders placed or performed during the hospital encounter of 01/05/24 (from the past 24 hour(s))   Transthoracic Echo (TTE) Complete   Result Value Ref Range    AV pk kermit 1.01     LVOT diam 2.00     LV biplane EF 62     MV avg E/e' ratio 6.25     MV E/A ratio 1.54     LA vol index A/L 16.8     Tricuspid annular plane systolic excursion 1.3     RV free wall pk S' 17.10     LVIDd 3.06     RVSP 12.5     Aortic Valve Area by Continuity of Peak Velocity 2.06     AV pk grad 4.1     LV A4C EF 60.1    Light Blue Top   Result Value Ref Range    Extra Tube Hold " for add-ons.    CBC   Result Value Ref Range    WBC 4.6 4.4 - 11.3 x10*3/uL    nRBC 0.0 0.0 - 0.0 /100 WBCs    RBC 3.81 (L) 4.00 - 5.20 x10*6/uL    Hemoglobin 10.5 (L) 12.0 - 16.0 g/dL    Hematocrit 37.4 36.0 - 46.0 %    MCV 98 80 - 100 fL    MCH 27.6 26.0 - 34.0 pg    MCHC 28.1 (L) 32.0 - 36.0 g/dL    RDW 12.0 11.5 - 14.5 %    Platelets 162 150 - 450 x10*3/uL   Basic Metabolic Panel   Result Value Ref Range    Glucose 95 74 - 99 mg/dL    Sodium 140 136 - 145 mmol/L    Potassium 3.8 3.5 - 5.3 mmol/L    Chloride 110 (H) 98 - 107 mmol/L    Bicarbonate 22 21 - 32 mmol/L    Anion Gap 12 10 - 20 mmol/L    Urea Nitrogen 5 (L) 6 - 23 mg/dL    Creatinine 0.46 (L) 0.50 - 1.05 mg/dL    eGFR >90 >60 mL/min/1.73m*2    Calcium 7.9 (L) 8.6 - 10.3 mg/dL     ECG 12 lead    Result Date: 1/8/2024  Normal sinus rhythm Normal ECG When compared with ECG of 01-AUG-2022 20:29, Previous ECG has undetermined rhythm, needs review T wave amplitude has decreased in Lateral leads    ECG 12 lead    Result Date: 1/8/2024  Normal sinus rhythm Nonspecific T wave abnormality Abnormal ECG When compared with ECG of 05-JAN-2024 12:52, (unconfirmed) ST now depressed in Anterior leads Nonspecific T wave abnormality now evident in Inferior leads T wave inversion now evident in Anterior leads    CT angio chest for pulmonary embolism    Result Date: 1/6/2024  Interpreted By:  Estrella Hewitt, STUDY: CT ANGIO CHEST FOR PULMONARY EMBOLISM;  1/6/2024 3:01 pm   INDICATION: Signs/Symptoms:elevated d-dimer.   COMPARISON: 08/09/2022   ACCESSION NUMBER(S): [id number]   ORDERING CLINICIAN: ELIANA RAVI   TECHNIQUE: Serial axial CT images of the chest obtained following the intravenous administration 60 mL Omnipaque 350. Sagittal, coronal and MIP reconstructions were generated.   FINDINGS: VESSELS: There is good opacification of the pulmonary arteries. There are no obvious pulmonary artery emboli.   The aorta and cava unremarkable.   HEART:  The heart is normal in  size.   MEDIASTINUM AND GEMMA: There is no significant mediastinal or hilar lymphadenopathy.   LUNG, PLEURA, AND LARGE AIRWAYS: The lungs are hyperinflated. There are bilateral patchy nodular and cavitary infiltrates. There is no obvious pleural fluid.   CHEST WALL AND LOWER NECK: The thyroid as visualized is unremarkable. There is minimal subcutaneous fat.   BONES: Patient is kyphotic.   UPPER ABDOMEN: Unremarkable   COMPARISON TO THE PRIOR EXAM: There is less consolidation at the left base there is resolution of the left pleural effusion. There however has been progression of upper lobe infiltrates.       No evidence of PE.   The lungs are hyperinflated.   Parenchymal patchy nodular infiltrates bilaterally. There are areas of cavitation within the infiltrates.   The left lower lobe consolidation on the previous exam and the left pleural fluid has largely resolved.   Signed by: Estrella Hewitt 1/6/2024 3:56 PM Dictation workstation:   [id number]    XR hip right with pelvis when performed 2 or 3 views    Result Date: 1/6/2024  Interpreted By:  Benton Camargo, STUDY: XR HIP RIGHT WITH PELVIS WHEN PERFORMED 2 OR 3 VIEWS;  1/6/2024 8:23 am   INDICATION: Signs/Symptoms:fall.   COMPARISON: None.   ACCESSION NUMBER(S): [id number]   ORDERING CLINICIAN: KHLOE HORNER   FINDINGS: No acute fracture identified. There is severe joint space narrowing of the right hip with femoral head flattening.       No definite acute osseous injury the pelvis or right hip. Severe degenerative changes of the right hip. Femoral head flattening could be related to remote subchondral fracture secondary to avascular necrosis.     Signed by: Benton Camargo 1/6/2024 8:28 AM Dictation workstation:   [id number]    XR chest 1 view    Result Date: 1/5/2024  Interpreted By:  Rodrick Ignacio, STUDY: XR CHEST 1 VIEW;  1/5/2024 8:15 pm   INDICATION: Signs/Symptoms:cp.   COMPARISON: Chest radiograph dated 08/15/2022. Chest CT dated 09/20/2022.   ACCESSION  NUMBER(S): [id number]   ORDERING CLINICIAN: JAMISON FREEMAN   FINDINGS:   CARDIOMEDIASTINAL SILHOUETTE: Cardiomediastinal silhouette is normal in size and configuration.   LUNGS/PLEURA: There are patchy opacities in the lateral right upper lobe, which may represent an acute infectious or inflammatory process. Hyperinflated and hyperlucent lungs. There are no consolidations.There are no pleural effusions. There is no demonstrated pneumothorax.     BONES: No evidence of acute osseous abnormality.       1.  Patchy opacities in the lateral right upper lobe which may represent an acute infectious or inflammatory process. The lungs are mildly hyperinflated and hyperlucent suggestive of emphysematous change.     Signed by: Rodrick Ignacio 1/5/2024 8:22 PM Dictation workstation:   [id number]    XR shoulder left 2+ views    Result Date: 1/5/2024  Interpreted By:  Maynor Bryson, STUDY: XR SHOULDER LEFT 2+ VIEWS; ;  1/5/2024 3:47 pm   INDICATION: Signs/Symptoms:fall pain.   COMPARISON: None.   ACCESSION NUMBER(S): [id number]   ORDERING CLINICIAN: JAMISON FREEMAN   FINDINGS: No fracture or dislocation. No joint effusion. AC joint adrenal gland joint are normal. Lung apex is clear.       No acute findings.     MACRO: None   Signed by: Maynor Bryson 1/5/2024 4:09 PM Dictation workstation:   [id number]    CT maxillofacial bones wo IV contrast    Result Date: 1/5/2024  Interpreted By:  Armen Villareal, STUDY: CT FACIAL BONES WO IV CONTRAST;  1/5/2024 11:51 am   INDICATION: Signs/Symptoms:pain swelling right jaw.   COMPARISON: None.   ACCESSION NUMBER(S): [id number]   ORDERING CLINICIAN: JAMISON FREEMAN   TECHNIQUE: Contiguous axial CT sections are performed through the facial bones and orbits and supplemented with coronal and sagittal reformatted images.   Multiplanar 3D reformations are also generated and reviewed on a separate workstation.   FINDINGS: There is mucoperiosteal thickening of the floor of the left maxillary  sinus with possible small fluid level. The remaining paranasal sinuses and mastoid air cells are clear and symmetrically aerated.   There is mild bowing of the nasal septum to the left. The ostiomeatal units are patent bilaterally.   The visualized orbital contents are unremarkable. The medial and inferior orbital walls are intact. There is no fluid collection or intraorbital air.   There is some soft tissue swelling anterior to the mandible at the midline and lateral to the right mandibular body. There is no soft tissue air or organized fluid collection. There is no underlying bone destruction or aggressive periosteal reaction. There is rounded apical lucency within the left maxilla at the level of the left molar with suspected violation of the medial cortex. This finding measures 1.5 cm in diameter. There is mild multifocal periapical lucencies on the right.       Soft tissue swelling anterior and lateral to the right mandibular body. There is no soft tissue air or localized fluid collection at this site.   1.5 cm round periapical lucency at the left mandibular molar compatible with periapical abscess.   Mild mucoperiosteal thickening at the floor of the left maxillary sinus with possible trace fluid. The remaining paranasal sinuses and mastoid air cells are clear and symmetrically aerated.   Slight bowing of the septum to the left. The ostiomeatal units remain patent bilaterally.   Signed by: Armen Villareal 1/5/2024 12:26 PM Dictation workstation:   [id number]    CT 3D reconstruction    Result Date: 1/5/2024  Interpreted By:  Armen Villareal, STUDY: CT FACIAL BONES WO IV CONTRAST;  1/5/2024 11:51 am   INDICATION: Signs/Symptoms:pain swelling right jaw.   COMPARISON: None.   ACCESSION NUMBER(S): [id number]   ORDERING CLINICIAN: JAMISON FREEMAN   TECHNIQUE: Contiguous axial CT sections are performed through the facial bones and orbits and supplemented with coronal and sagittal reformatted images.    Multiplanar 3D reformations are also generated and reviewed on a separate workstation.   FINDINGS: There is mucoperiosteal thickening of the floor of the left maxillary sinus with possible small fluid level. The remaining paranasal sinuses and mastoid air cells are clear and symmetrically aerated.   There is mild bowing of the nasal septum to the left. The ostiomeatal units are patent bilaterally.   The visualized orbital contents are unremarkable. The medial and inferior orbital walls are intact. There is no fluid collection or intraorbital air.   There is some soft tissue swelling anterior to the mandible at the midline and lateral to the right mandibular body. There is no soft tissue air or organized fluid collection. There is no underlying bone destruction or aggressive periosteal reaction. There is rounded apical lucency within the left maxilla at the level of the left molar with suspected violation of the medial cortex. This finding measures 1.5 cm in diameter. There is mild multifocal periapical lucencies on the right.       Soft tissue swelling anterior and lateral to the right mandibular body. There is no soft tissue air or localized fluid collection at this site.   1.5 cm round periapical lucency at the left mandibular molar compatible with periapical abscess.   Mild mucoperiosteal thickening at the floor of the left maxillary sinus with possible trace fluid. The remaining paranasal sinuses and mastoid air cells are clear and symmetrically aerated.   Slight bowing of the septum to the left. The ostiomeatal units remain patent bilaterally.   Signed by: Armen Villareal 1/5/2024 12:26 PM Dictation workstation:   [id number]    CT cervical spine wo IV contrast    Result Date: 1/5/2024  Interpreted By:  Armen Villareal, STUDY: CT CERVICAL SPINE WO IV CONTRAST;  1/5/2024 11:51 am   INDICATION: Signs/Symptoms:pain fall.   COMPARISON: None.   ACCESSION NUMBER(S): [id number]   ORDERING CLINICIAN: JAMISON  LYDIA   TECHNIQUE: Contiguous axial CT sections are performed from the skullbase to the upper thoracic spine and supplemented with coronal and sagittal reformatted images.   FINDINGS: There is mild dextro convexity of the cervical spine. The cervical vertebral body AP alignment is within normal limits. The facet joints align normally.   The cervical vertebral body heights are maintained. There is no sign of acute cervical spine fracture. The C1 ring is intact. There is no bone destruction or aggressive periosteal reaction. No lytic or blastic lesion is identified. The surrounding visualized osseous structures are intact.   There is no evidence of central canal narrowing.   There is multilevel hypertrophic facet arthrosis on the right with at least mild narrowing of the right C3-4 and C4-5 neural foramen.   There is biapical pleural and parenchymal scarring. There are nodular opacities with cavitation and bronchiectasis in the lung apices. The remaining surrounding soft tissues are unremarkable.       No sign of acute fracture or subluxation.   Dextro convexity of the cervical spine with multilevel hypertrophic facet arthrosis on the right.   No central canal narrowing.   Biapical pleural and parenchymal scarring with nodular cavitary apical opacities and bronchiectasis. This appearance has progressed in the interval from 08/09/2022. The need for further workup should be determined clinically.   Signed by: Armen Villareal 1/5/2024 12:20 PM Dictation workstation:   [id number]    CT head wo IV contrast    Result Date: 1/5/2024  Interpreted By:  Armen Villareal, STUDY: CT HEAD WO IV CONTRAST;  1/5/2024 11:51 am   INDICATION: Signs/Symptoms:fall syncope.   COMPARISON: None.   ACCESSION NUMBER(S): [id number]   ORDERING CLINICIAN: JAMISON FREEMAN   TECHNIQUE: Contiguous unenhanced axial CT sections are performed from the skull base to the vertex.   FINDINGS: The osseous structures are intact. The visualized  portions of the paranasal sinuses and mastoid air cells are clear. There is mucoperiosteal thickening at the floor of the left maxillary sinus.   The cortical sulci and CSF spaces are symmetric in appearance. There is no sign of parenchymal hematoma or dense extra-axial fluid collection. There is no mass effect or midline shift. The gray matter/white-matter differentiation is preserved.       No CT evidence of acute intracranial hemorrhage or mass effect.   Signed by: Armen Villareal 1/5/2024 12:16 PM Dictation workstation:   [id number]        Assessment/Plan   Principal Problem:    Elevated troponin  Active Problems:    Syncope and collapse    Dental abscess    NSTEMI (non-ST elevated myocardial infarction) (CMS/HCC)    Rheumatoid arthritis involving multiple sites with positive rheumatoid factor (CMS/HCC)    Diverticulosis    History of cardiac arrest    Anxiety    63 YOF with NSTEMI vs Type II MI in the setting of dental abscess    PAF at home PTA  Palpitations/Tachycardia during acute illness  Dental infection on ATB       Plan:  Complete ACS protocol  Continue GDMT as follows  -ASA 81 mg daily   -Brilinta loading dose followed by 90 mg BID   -Atorvastatin 40 mg HS   3. Pending transfer to facility with maxillofacial as well as higher level of cardiac care to determine ischemic evaluation     Thank you for the consult. Will follow.   Please call or message with questions or concerns   The case was discussed with SHANKAR Watson-CNP       SHANKAR Rees-CNP

## 2024-01-09 NOTE — CARE PLAN
The patient's goals for the shift include  Pt will report pain at a tolerable level throughout shift.     The clinical goals for the shift include Pt will tolerate IV abx throughout shift.    Pt remained safe and stable throughout shift. VS were stable and pt received tylenol for pain. IV abx were administered throughout shift and pt tolerated well. Pt refused to use to get out of bed today. Pt still waiting for bed at [facility]. No other complaints at this time. Pt resting in bed with call light within reach.

## 2024-01-09 NOTE — CONSULTS
"Nutrition Assessment Note  Nutrition Assessment      Reason for Assessment  Reason for Assessment: Dietitian discretion (Self-referral, case finding, underweight BMI 16.6.)    Per H&P / Progress Notes:  Pt presented to ED from home after a syncopal episode with fall.  Pt taking antibiotics for a dental abscess which are causing emesis.  Pt also with elevated troponin and hypotension.  She was started on a heparin drip, received antibiotics and was admitted to [facility].  ENT consulted for dental abscess.  ID and Cardiology also consulted.  Pt planned for possible transfer to OSH for cath-capable facility for cardiac care.  Current diet order is Cardiac.  NS @ 75 mL/hour.    Past Medical History:   Diagnosis Date    Anxiety      Arthritis      Encounter for screening for other viral diseases 02/08/2016     Need for hepatitis B screening test    Muscle weakness (generalized)       Muscle weakness    Pain in unspecified joint       Multiple joint pain    Pneumonia, unspecified organism 11/10/2015     Community acquired pneumonia   RA, diverticulosiis, colostomy 8/2022, cardiac arrest 8/4/22     Past Surgical History:  CT GUIDED PERCUTANEOUS PERITONEAL OR RETROPERITONEAL FLUID COLLECTION DRAINAGE 1/31/2022     CT GUIDED PERCUTANEOUS PERITONEAL OR RETROPERITONEAL FLUID COLLECTION DRAINAGE [facility]    CT GUIDED PERCUTANEOUS PERITONEAL OR RETROPERITONEAL FLUID COLLECTION DRAINAGE 8/10/2022     CT GUIDED PERCUTANEOUS PERITONEAL OR RETROPERITONEAL FLUID COLLECTION DRAINAGE [facility]    Medications Reviewed.    Pertinent Labs:  1/7/24:  H/H 10.4/34 (L)  MCHC 30.6 (L)  K+ 3.4 (L)  Cl 108 (H)  Anion gap 9 (L)  Cr 0.44 (L)  Ca 7.8 (L)    Last Recorded Vitals  Blood pressure 107/67, pulse 88, temperature 36.8 °C (98.2 °F), temperature source Temporal, resp. rate 16, height 1.651 m (5' 5\"), weight 45.1 kg (99 lb 6.8 oz), SpO2 96 %.      History:  Food and Nutrient History  Energy Intake: Fair 50-75 " "%  Food and Nutrient History: Visited Maggie in room, she is sitting up in bed.  She states that she ate Divehi toast, lay, drank juice for breakfast and ate a turkey sandwich with linton and ice cream for lunch today.  Says she has a small appetite.  Prior to admission, she reports eating frozen meals, peanut butter sandwiches, deep fried pizza rolls, tries to drink one Boost Plus (360 kcal/14 g protein) per day.  Confirms history of ostomy, says she knows which foods she is able to tolerate.  Presently she denies nausea, stomach pain, difficulty chewing / swallowing.  +ostomy output.  Endorses 50 lbs unintentional weight loss in 2022 due to health issues.  Reports highest weight 160 lbs.  +150 mL loose, watery, brown colostomy output per nursing flowsheet 1/8.  Pt declined Ensure during this admission.  Vitamin/Herbal Supplement Use: Boost Plus one time per day (360 kcal/14 g protein)         Food and Nutrient Administration History  Additional Food and Nutrient Administration History: PO Intake per flowsheet:  1/6- 75% D; 1/7- 75% B / ? L / 50% D; 1/8- 100% B                   Anthropometrics:  Height: 165.1 cm (5' 5\")  Weight: 45.1 kg (99 lb 6.8 oz)  BMI (Calculated): 16.55    Weight Change: 0    Weight Change  Weight History / % Weight Change: UBW (self-reported):  160 lbs / 72.7 kg (in 2022)-->  current weight is 38% less than reported UBW.  Weight history per chart:  1/6/24- 45.1 kg; 1/5/24- 44.5 kg.  Weight mostly stable over the past year with slight fluctuations.  Significant Weight Loss: No         IBW/kg (Dietitian Calculated): 63.2 kg  Percent of IBW: 71.4 %     Wt Readings from Last 10 Encounters:   01/09/24 45.1 kg (99 lb 6.8 oz)   12/05/23 46.3 kg (102 lb)   08/09/23 (!) 44 kg (97 lb)   04/25/23 (!) 44 kg (97 lb)   04/19/23 (!) 44 kg (97 lb)   12/14/22 (!) 44 kg (97 lb)   12/13/22 44 kg (97 lb)   11/04/22 45.3 kg (99 lb 12.8 oz)   09/09/22 46.3 kg (102 lb)   09/07/22 63.5 kg (140 lb)             " "                  Energy Needs:  Calculated Energy Needs Using Equations  Height: 165.1 cm (5' 5\")  Weight Used for Equation Calculations: 45.1 kg (99 lb 6.8 oz)  [place] Equation (Overweight or Obese Patients): 1007  Temp: 36.5 °C (97.7 °F)    Estimated Energy Needs  Total Energy Estimated Needs (kCal): 1580 kCal  Total Estimated Energy Need per Day (kCal/kg): 35 kCal/kg  Method for Estimating Needs: 35 kcal/kg actual weight.    Estimated Protein Needs  Total Protein Estimated Needs (g): 68 g  Total Protein Estimated Needs (g/kg): 1.5 g/kg  Method for Estimating Needs: 1.5 g/kg actual weight.    Estimated Fluid Needs  Total Fluid Estimated Needs (mL): 1580 mL  Method for Estimating Needs: 1 mL/kcal or fluid per medical team.         Nutrition Focused Physical Findings:  Subcutaneous Fat Loss  Orbital Fat Pads: Well nourshed (slightly bulging fat pads)  Buccal Fat Pads: Well nourished (full, rounded cheeks)  Triceps: Well nourished (ample fat tissue)  Ribs: Defer    Muscle Wasting  Temporalis: Mild-Moderate (slight depression)  Pectoralis (Clavicular Region): Mild-Moderate (some protrusion of clavicle)  Deltoid/Trapezius: Mild-Moderate (slight protrusion of acromion process)  Interosseous: Defer (hands contracted, unable to assess.)  Trapezius/Infraspinatus/Supraspinatus (Scapular Region): Defer  Quadriceps: Defer  Gastrocnemius: Well nourished (well developed bulbous muscle)    Edema  Edema: none  Edema Location: per NFPE         Physical Findings (Nutrition Deficiency/Toxicity)  Hair: Negative  Eyes: Negative  Nails: Negative  Skin: Positive (ecchymosis per nursing flowsheet.)       Nutrition Diagnosis   Malnutrition Diagnosis  Patient has Malnutrition Diagnosis: Yes  Diagnosis Status: New  Malnutrition Diagnosis: Moderate malnutrition related to chronic disease or condition  As Evidenced by: mild to moderate muscle loss (temporal depression, pectoralis major, deltoids), fair appetite and intake meeting " less than 75% of estimated needs for greater than or equal to one month, significant unintentional weight loss in the past, weight stable over the past year, however pt with underweight BMI 16.55.                                                             Nutrition Interventions/Recommendations   Nutrition Prescription  Individualized Nutrition Prescription Provided for : diet, fluids, oral supplements, diet education    Food and/or Nutrient Delivery Interventions  Meals and Snacks: Energy-modified diet, Fat-modified diet, Mineral-modified diet, Protein-modified diet  Goal: Continue Cardiac Diet; Encourage intake of protein foods at each meal.                                    Medical Food Supplement: Commercial beverage  Goal: Pt decline Ensure, will offer again at follow up.                             Additional Interventions: 1.  Monitor lytes and replace as needed; 2.  Check weight 2 to 3 times per week.         Coordination of Nutrition Care by a Nutrition Professional  Collaboration and Referral of Nutrition Care: Collaboration by nutrition professional with other providers  Goal: Pt reviewed at IDT Rounds.    Education Documentation  No documentation found.    -Encouraged intake of protein foods at each meal, food sources of protein; printed high calorie and high protein diet education from [employer] and list of high protein foods from AND.       Nutrition Monitoring and Evaluation   Food and Nutrient Related History  Energy Intake: Estimated energy intake  Criteria: Nutritional intake meeting > 75% of estimated needs.    Fluid Intake: Estimated fluid intake  Criteria: Fluid intake meeting estimated needs.    Amount of Food: Medical food intake  Criteria: Will offer oral supplements again at time of follow up, as able.                             Anthropometrics: Body Composition/Growth/Weight History  Weight: Measured weight  Criteria: Gradual non-fluid weight gain to acceptable BMI 23 to < 25 kg/m2.                    Biochemical Data, Medical Tests and Procedures  Electrolyte and Renal Panel: Potassium  Criteria: Renal labs / lytes wnl.                        Nutrition Focused Physical Findings  Adipose: Loss of subcutaneous fat  Criteria: Gain / maintain adipose stores.              Muscles: Muscle atrophy  Criteria: Gain / maintain lean muscle mass.                   Follow Up  Time Spent (min): 60 minutes  Last Date of Nutrition Visit: 01/08/24  Nutrition Follow-Up Needed?: 3-5 days  Follow up Comment: % meals; ONS?; mod PCM

## 2024-01-09 NOTE — PROGRESS NOTES
"Maggie Carrington is a 63 y.o. female on day 2 of admission presenting with Elevated troponin.    Subjective   She is resting in bed this morning, no complaints of chest pain, shortness of breath, cough, fever. She is having pain all over today, still asking for tylenol. We discussed her pending transfer for cardiac intervention and ongoing IV antibiotics. Will continue to monitor while waiting for transfer.     Objective     Physical Exam  Constitutional:       General: She is not in acute distress.     Comments: Cachectic   HENT:      Head: Normocephalic.      Mouth/Throat:      Mouth: Mucous membranes are moist.   Eyes:      Extraocular Movements: Extraocular movements intact.      Pupils: Pupils are equal, round, and reactive to light.   Cardiovascular:      Rate and Rhythm: Normal rate and regular rhythm.      Pulses: Normal pulses.      Heart sounds: Normal heart sounds.   Pulmonary:      Breath sounds: Normal breath sounds. No wheezing or rales.      Comments: Poor effort   Abdominal:      General: Bowel sounds are normal. There is no distension.      Tenderness: There is no abdominal tenderness. There is no guarding.   Musculoskeletal:         General: Swelling, tenderness and deformity present.      Cervical back: Rigidity present.      Comments: Bilateral hands and feet with nodules, deformities in fingers and toes   Skin:     General: Skin is warm and dry.      Capillary Refill: Capillary refill takes less than 2 seconds.      Coloration: Skin is pale.   Neurological:      General: No focal deficit present.      Mental Status: She is alert and oriented to person, place, and time.   Psychiatric:         Mood and Affect: Mood normal.         Behavior: Behavior normal.      Last Recorded Vitals  Blood pressure 120/71, pulse 94, temperature 36.6 °C (97.9 °F), temperature source Temporal, resp. rate 18, height 1.651 m (5' 5\"), weight 45.1 kg (99 lb 6.8 oz), SpO2 96 %.  Intake/Output last 3 Shifts:  I/O last 3 " completed shifts:  In: 3695 (81.9 mL/kg) [P.O.:720; I.V.:2225 (49.3 mL/kg); IV Piggyback:750]  Out: 1575 (34.9 mL/kg) [Urine:1000 (0.6 mL/kg/hr); Stool:575]  Weight: 45.1 kg     Scheduled medications  ampicillin-sulbactam, 3 g, intravenous, q6h  aspirin, 81 mg, oral, Daily  atorvastatin, 40 mg, oral, Nightly  azithromycin, 500 mg, intravenous, q24h  magnesium oxide, 400 mg, oral, Daily  melatonin, 6 mg, oral, Daily  [Held by provider] metoprolol succinate XL, 50 mg, oral, BID  pantoprazole, 40 mg, oral, Daily before breakfast   Or  pantoprazole, 40 mg, intravenous, Daily before breakfast  ticagrelor, 90 mg, oral, BID    Continuous medications  sodium chloride 0.9%, 75 mL/hr, Last Rate: 75 mL/hr (01/09/24 0019)    PRN medications  PRN medications: acetaminophen **OR** acetaminophen **OR** acetaminophen, acetaminophen **OR** acetaminophen **OR** acetaminophen, benzocaine-menthoL, dextromethorphan-guaifenesin, guaiFENesin, oxyCODONE, polyethylene glycol    Relevant Results  ECG 12 lead  Result Date: 1/8/2024  Normal sinus rhythm     CT angio chest for pulmonary embolism  Result Date: 1/6/2024  No evidence of PE.   The lungs are hyperinflated.   Parenchymal patchy nodular infiltrates bilaterally. There are areas of cavitation within the infiltrates.   The left lower lobe consolidation on the previous exam and the left pleural fluid has largely resolved.       XR hip right with pelvis when performed 2 or 3 views  Result Date: 1/6/2024  No definite acute osseous injury the pelvis or right hip. Severe degenerative changes of the right hip. Femoral head flattening could be related to remote subchondral fracture secondary to avascular necrosis.         XR chest 1 view  Result Date: 1/5/2024  1.  Patchy opacities in the lateral right upper lobe which may represent an acute infectious or inflammatory process. The lungs are mildly hyperinflated and hyperlucent suggestive of emphysematous change.         XR shoulder left 2+  views  Result Date: 1/5/2024  No acute findings.         CT maxillofacial bones wo IV contrast  Result Date: 1/5/2024  Soft tissue swelling anterior and lateral to the right mandibular body. There is no soft tissue air or localized fluid collection at this site.   1.5 cm round periapical lucency at the left mandibular molar compatible with periapical abscess.   Mild mucoperiosteal thickening at the floor of the left maxillary sinus with possible trace fluid. The remaining paranasal sinuses and mastoid air cells are clear and symmetrically aerated.   Slight bowing of the septum to the left. The ostiomeatal units remain patent bilaterally.       CT cervical spine wo IV contrast  Result Date: 1/5/2024  No sign of acute fracture or subluxation.   Dextro convexity of the cervical spine with multilevel hypertrophic facet arthrosis on the right.   No central canal narrowing. Biapical pleural and parenchymal scarring with nodular cavitary apical opacities and bronchiectasis. This appearance has progressed in the interval from 08/09/2022. The need for further workup should be determined clinically.       CT head wo IV contrast  Result Date: 1/5/2024  No CT evidence of acute intracranial hemorrhage or mass effect.        Latest Reference Range & Units 01/06/24 08:12 01/07/24 07:26 01/08/24 06:46 01/09/24 06:32   GLUCOSE 74 - 99 mg/dL 92 94 87 95   SODIUM 136 - 145 mmol/L 140 139 143 140   POTASSIUM 3.5 - 5.3 mmol/L 3.6 3.4 (L) 4.1 3.8   CHLORIDE 98 - 107 mmol/L 106 108 (H) 113 (H) 110 (H)   Bicarbonate 21 - 32 mmol/L 25 25 21 22   Anion Gap 10 - 20 mmol/L 13 9 (L) 13 12   Blood Urea Nitrogen 6 - 23 mg/dL 9 6 7 5 (L)   Creatinine 0.50 - 1.05 mg/dL 0.48 (L) 0.44 (L) 0.47 (L) 0.46 (L)   EGFR >60 mL/min/1.73m*2 >90 >90 >90 >90   Calcium 8.6 - 10.3 mg/dL 8.3 (L) 7.8 (L) 7.9 (L) 7.9 (L)   Albumin 3.4 - 5.0 g/dL 3.1 (L)      Alkaline Phosphatase 33 - 136 U/L 47      ALT 7 - 45 U/L 13      AST 9 - 39 U/L 21      Bilirubin Total 0.0 -  1.2 mg/dL 0.3      Total Protein 6.4 - 8.2 g/dL 5.8 (L)      MAGNESIUM 1.60 - 2.40 mg/dL 1.59 (L)         Latest Reference Range & Units 01/05/24 11:23 01/05/24 13:07 01/05/24 15:26 01/06/24 08:12   Troponin I, High Sensitivity 0 - 13 ng/L 94 (HH) 127 (HH) 170 (HH) 75 (HH)      [facility] Reference Range & Units 01/05/24 11:20 01/06/24 23:53 01/07/24 07:26 01/08/24 06:46 01/09/24 06:32   WBC 4.4 - 11.3 x10*3/uL 4.3 (L) 3.5 (L) 3.3 (L) 2.7 (L) 4.6   nRBC 0.0 - 0.0 /100 WBCs 0.0 0.0 0.0 0.0 0.0   RBC 4.00 - 5.20 x10*6/uL 4.30 3.76 (L) 3.74 (L) 3.53 (L) 3.81 (L)   HEMOGLOBIN 12.0 - 16.0 g/dL 11.7 (L) 10.4 (L) 10.4 (L) 10.0 (L) 10.5 (L)   HEMATOCRIT 36.0 - 46.0 % 39.1 33.5 (L) 34.0 (L) 34.0 (L) 37.4   MCV 80 - 100 fL 91 89 91 96 98   MCH 26.0 - 34.0 pg 27.2 27.7 27.8 28.3 27.6   MCHC 32.0 - 36.0 g/dL 29.9 (L) 31.0 (L) 30.6 (L) 29.4 (L) 28.1 (L)   RED CELL DISTRIBUTION WIDTH 11.5 - 14.5 % 12.4 12.3 12.3 12.8 12.0   Platelets 150 - 450 x10*3/uL 171 172 176 159 162     Assessment/Plan   Principal Problem:    Elevated troponin  Active Problems:    Syncope and collapse    Dental abscess    NSTEMI (non-ST elevated myocardial infarction) (CMS/HCC)    Rheumatoid arthritis involving multiple sites with positive rheumatoid factor (CMS/HCC)    Diverticulosis    History of cardiac arrest    Anxiety    #Elevated troponin  #History of cardiac arrest  #Syncope and collapse  #NSTEMI (non-ST elevated myocardial infarction) (CMS/HCC)  #Hypotension  #Hyperlipidemia  - Hx: VT/PE cardiac arrest post-surgery in 8/2022, ROSC quickly  - syncopal at home after n/v caused by oral abx  - appetite low lately  - trauma to face with fall  - troponin 94 > 127 > 170 > 75  - EKG with TWI, no RAMANDEEP  - denies CP at any time  - telemetry  - Cardiology consulted, appreciate recs  - transfer initiated to [facility] where her Cardiologist sees her   - transfer to [facility] today for possible intervention  - heparin gtt dc'd  - metop changed to tartrate with parameters  for hypotension  - continue asa, statin, ticagrelor  - orthostatic VS daily     #Dental abscess  #Pulmonary infiltrate on CT   - was on oral abx at home, nausea and vomiting ensued  - continue ampicillin-sulbactam, day 4  - Dr bryson consulted, appreciate recs  - ID consulted, appreciate recs  - CXR: 1.  Patchy opacities in the lateral right upper lobe which may represent an acute infectious or inflammatory process. The lungs are mildly hyperinflated and hyperlucent suggestive of emphysematous change.  - CT chest: Parenchymal patchy nodular infiltrates bilaterally. There are areas of cavitation within the infiltrates.    - added azithromycin for cavitary lesion vs infiltrate on CT chest, pt asymptomatic, day 2     #Rheumatoid arthritis involving multiple sites with positive rheumatoid factor (CMS/HCC)  - receives Orencia injections weekly  - follows with Rhuematology  - uses Excedrin at home for pain (cannot take with Brilinta)  - tylenol, ibuprofen, oxycodone PRN pain     #Diverticulosis  - s/p colostomy since 8/2022  - stoma care and pouch changes as needed     #Anxiety  - supportive care    GI ppx: PPI  DVT ppx: heparin gtt     Fluids: as needed  Electrolytes: replace as needed  Nutrition: cardiac  Adjuncts: PIV     Code Status: full code  Pt requires inpatient stay at this time.  Total accumulated time spent face to face and not face to face preparing to see the patient, obtaining and reviewing separately obtained history; performing a medically appropriate examination and/or evaluation; counseling and educating the patient, family; ordering medications, tests, or procedures; referring and communicating with other health care professionals; documenting clinical information in the patient's medical record; independently interpreting results and communicating the results to the patient, family; and care coordination was 35 minutes.     Leida Delacruz, APRN-CNP

## 2024-01-10 ENCOUNTER — APPOINTMENT (OUTPATIENT)
Dept: CARDIOLOGY | Facility: HOSPITAL | Age: 64
DRG: 158 | End: 2024-01-10
Payer: COMMERCIAL

## 2024-01-10 ENCOUNTER — HOSPITAL ENCOUNTER (INPATIENT)
Facility: HOSPITAL | Age: 64
LOS: 2 days | Discharge: NURSING, REHAB OR LONG-TERM CARE | DRG: 158 | End: 2024-01-12
Attending: INTERNAL MEDICINE | Admitting: INTERNAL MEDICINE
Payer: COMMERCIAL

## 2024-01-10 VITALS
HEART RATE: 75 BPM | BODY MASS INDEX: 16.57 KG/M2 | WEIGHT: 99.43 LBS | TEMPERATURE: 97.5 F | SYSTOLIC BLOOD PRESSURE: 116 MMHG | HEIGHT: 65 IN | OXYGEN SATURATION: 94 % | RESPIRATION RATE: 18 BRPM | DIASTOLIC BLOOD PRESSURE: 69 MMHG

## 2024-01-10 DIAGNOSIS — I10 ESSENTIAL HYPERTENSION: ICD-10-CM

## 2024-01-10 DIAGNOSIS — I21.4 NSTEMI (NON-ST ELEVATED MYOCARDIAL INFARCTION) (MULTI): Primary | ICD-10-CM

## 2024-01-10 DIAGNOSIS — K04.7 DENTAL ABSCESS: ICD-10-CM

## 2024-01-10 LAB
ANION GAP BLDV CALCULATED.4IONS-SCNC: 7 MMOL/L (ref 10–25)
APTT PPP: 32 SECONDS (ref 27–38)
ATRIAL RATE: 91 BPM
ATRIAL RATE: 91 BPM
BASE EXCESS BLDV CALC-SCNC: 3.3 MMOL/L (ref -2–3)
BNP SERPL-MCNC: 468 PG/ML (ref 0–99)
BODY TEMPERATURE: ABNORMAL
CA-I BLDV-SCNC: 1.18 MMOL/L (ref 1.1–1.33)
CARDIAC TROPONIN I PNL SERPL HS: 16 NG/L (ref 0–13)
CHLORIDE BLDV-SCNC: 103 MMOL/L (ref 98–107)
ERYTHROCYTE [DISTWIDTH] IN BLOOD BY AUTOMATED COUNT: 11.9 % (ref 11.5–14.5)
GLUCOSE BLDV-MCNC: 99 MG/DL (ref 74–99)
HCO3 BLDV-SCNC: 26.9 MMOL/L (ref 22–26)
HCT VFR BLD AUTO: 32.4 % (ref 36–46)
HCT VFR BLD EST: 47 % (ref 36–46)
HGB BLD-MCNC: 9.8 G/DL (ref 12–16)
HGB BLDV-MCNC: 15.8 G/DL (ref 12–16)
INHALED O2 CONCENTRATION: 21 %
INR PPP: 1.2 (ref 0.9–1.1)
LACTATE BLDV-SCNC: 1.1 MMOL/L (ref 0.4–2)
LACTATE SERPL-SCNC: 0.6 MMOL/L (ref 0.4–2)
MCH RBC QN AUTO: 27.6 PG (ref 26–34)
MCHC RBC AUTO-ENTMCNC: 30.2 G/DL (ref 32–36)
MCV RBC AUTO: 91 FL (ref 80–100)
NRBC BLD-RTO: 0 /100 WBCS (ref 0–0)
OXYHGB MFR BLDV: 88.8 % (ref 45–75)
P AXIS: 33 DEGREES
P AXIS: 64 DEGREES
P OFFSET: 213 MS
P OFFSET: 219 MS
P ONSET: 167 MS
P ONSET: 170 MS
PCO2 BLDV: 37 MM HG (ref 41–51)
PH BLDV: 7.47 PH (ref 7.33–7.43)
PLATELET # BLD AUTO: 177 X10*3/UL (ref 150–450)
PO2 BLDV: 59 MM HG (ref 35–45)
POTASSIUM BLDV-SCNC: 3.8 MMOL/L (ref 3.5–5.3)
PR INTERVAL: 114 MS
PR INTERVAL: 118 MS
PROTHROMBIN TIME: 13.3 SECONDS (ref 9.8–12.8)
Q ONSET: 224 MS
Q ONSET: 229 MS
QRS COUNT: 15 BEATS
QRS COUNT: 15 BEATS
QRS DURATION: 60 MS
QRS DURATION: 60 MS
QT INTERVAL: 348 MS
QT INTERVAL: 354 MS
QTC CALCULATION(BAZETT): 428 MS
QTC CALCULATION(BAZETT): 435 MS
QTC FREDERICIA: 400 MS
QTC FREDERICIA: 407 MS
R AXIS: 75 DEGREES
R AXIS: 79 DEGREES
RBC # BLD AUTO: 3.55 X10*6/UL (ref 4–5.2)
SAO2 % BLDV: 91 % (ref 45–75)
SODIUM BLDV-SCNC: 133 MMOL/L (ref 136–145)
T AXIS: 1 DEGREES
T AXIS: 46 DEGREES
T OFFSET: 401 MS
T OFFSET: 403 MS
VENTRICULAR RATE: 91 BPM
VENTRICULAR RATE: 91 BPM
WBC # BLD AUTO: 5.5 X10*3/UL (ref 4.4–11.3)

## 2024-01-10 PROCEDURE — 1200000002 HC GENERAL ROOM WITH TELEMETRY DAILY

## 2024-01-10 PROCEDURE — 99223 1ST HOSP IP/OBS HIGH 75: CPT

## 2024-01-10 PROCEDURE — 93005 ELECTROCARDIOGRAM TRACING: CPT

## 2024-01-10 PROCEDURE — 94760 N-INVAS EAR/PLS OXIMETRY 1: CPT

## 2024-01-10 PROCEDURE — 84484 ASSAY OF TROPONIN QUANT: CPT

## 2024-01-10 PROCEDURE — 83880 ASSAY OF NATRIURETIC PEPTIDE: CPT

## 2024-01-10 PROCEDURE — 2500000004 HC RX 250 GENERAL PHARMACY W/ HCPCS (ALT 636 FOR OP/ED)

## 2024-01-10 PROCEDURE — 85027 COMPLETE CBC AUTOMATED: CPT

## 2024-01-10 PROCEDURE — 83605 ASSAY OF LACTIC ACID: CPT

## 2024-01-10 PROCEDURE — 85610 PROTHROMBIN TIME: CPT

## 2024-01-10 PROCEDURE — 36415 COLL VENOUS BLD VENIPUNCTURE: CPT

## 2024-01-10 PROCEDURE — 85730 THROMBOPLASTIN TIME PARTIAL: CPT

## 2024-01-10 PROCEDURE — 84132 ASSAY OF SERUM POTASSIUM: CPT

## 2024-01-10 PROCEDURE — 2500000001 HC RX 250 WO HCPCS SELF ADMINISTERED DRUGS (ALT 637 FOR MEDICARE OP)

## 2024-01-10 PROCEDURE — 99223 1ST HOSP IP/OBS HIGH 75: CPT | Performed by: NURSE PRACTITIONER

## 2024-01-10 RX ORDER — HEPARIN SODIUM 5000 [USP'U]/ML
5000 INJECTION, SOLUTION INTRAVENOUS; SUBCUTANEOUS EVERY 8 HOURS
Status: DISCONTINUED | OUTPATIENT
Start: 2024-01-10 | End: 2024-01-10

## 2024-01-10 RX ORDER — METOPROLOL TARTRATE 25 MG/1
25 TABLET, FILM COATED ORAL 2 TIMES DAILY
Status: DISCONTINUED | OUTPATIENT
Start: 2024-01-10 | End: 2024-01-13 | Stop reason: HOSPADM

## 2024-01-10 RX ORDER — HEPARIN SODIUM 5000 [USP'U]/ML
60 INJECTION, SOLUTION INTRAVENOUS; SUBCUTANEOUS ONCE
Status: DISCONTINUED | OUTPATIENT
Start: 2024-01-10 | End: 2024-01-10

## 2024-01-10 RX ORDER — HEPARIN SODIUM 5000 [USP'U]/ML
3000-4000 INJECTION, SOLUTION INTRAVENOUS; SUBCUTANEOUS EVERY 4 HOURS PRN
Status: DISCONTINUED | OUTPATIENT
Start: 2024-01-10 | End: 2024-01-10

## 2024-01-10 RX ORDER — ACETAMINOPHEN 325 MG/1
650 TABLET ORAL EVERY 6 HOURS PRN
Status: DISCONTINUED | OUTPATIENT
Start: 2024-01-10 | End: 2024-01-13 | Stop reason: HOSPADM

## 2024-01-10 RX ORDER — HEPARIN SODIUM 10000 [USP'U]/100ML
0-4000 INJECTION, SOLUTION INTRAVENOUS CONTINUOUS
Status: DISCONTINUED | OUTPATIENT
Start: 2024-01-10 | End: 2024-01-10

## 2024-01-10 RX ORDER — HEPARIN SODIUM 5000 [USP'U]/ML
INJECTION, SOLUTION INTRAVENOUS; SUBCUTANEOUS EVERY 4 HOURS PRN
Status: DISCONTINUED | OUTPATIENT
Start: 2024-01-10 | End: 2024-01-10

## 2024-01-10 RX ORDER — HEPARIN SODIUM 5000 [USP'U]/ML
2000-4000 INJECTION, SOLUTION INTRAVENOUS; SUBCUTANEOUS EVERY 4 HOURS PRN
Status: DISCONTINUED | OUTPATIENT
Start: 2024-01-10 | End: 2024-01-10

## 2024-01-10 RX ORDER — POLYETHYLENE GLYCOL 3350 17 G/17G
17 POWDER, FOR SOLUTION ORAL DAILY
Status: DISCONTINUED | OUTPATIENT
Start: 2024-01-10 | End: 2024-01-13 | Stop reason: HOSPADM

## 2024-01-10 RX ORDER — ASPIRIN 81 MG/1
81 TABLET ORAL DAILY
Status: DISCONTINUED | OUTPATIENT
Start: 2024-01-10 | End: 2024-01-13 | Stop reason: HOSPADM

## 2024-01-10 RX ORDER — ATORVASTATIN CALCIUM 80 MG/1
80 TABLET, FILM COATED ORAL NIGHTLY
Status: DISCONTINUED | OUTPATIENT
Start: 2024-01-10 | End: 2024-01-13 | Stop reason: HOSPADM

## 2024-01-10 RX ADMIN — TICAGRELOR 90 MG: 90 TABLET ORAL at 08:34

## 2024-01-10 RX ADMIN — ASPIRIN 81 MG: 81 TABLET, COATED ORAL at 08:33

## 2024-01-10 RX ADMIN — AMPICILLIN SODIUM AND SULBACTAM SODIUM 3 G: 2; 1 INJECTION, POWDER, FOR SOLUTION INTRAMUSCULAR; INTRAVENOUS at 11:15

## 2024-01-10 RX ADMIN — ATORVASTATIN CALCIUM 80 MG: 80 TABLET, FILM COATED ORAL at 21:05

## 2024-01-10 RX ADMIN — METOPROLOL TARTRATE 25 MG: 25 TABLET, FILM COATED ORAL at 08:33

## 2024-01-10 RX ADMIN — METOPROLOL TARTRATE 25 MG: 25 TABLET, FILM COATED ORAL at 21:05

## 2024-01-10 RX ADMIN — AZITHROMYCIN MONOHYDRATE 500 MG: 500 INJECTION, POWDER, LYOPHILIZED, FOR SOLUTION INTRAVENOUS at 16:42

## 2024-01-10 RX ADMIN — ACETAMINOPHEN 650 MG: 325 TABLET ORAL at 21:05

## 2024-01-10 RX ADMIN — ACETAMINOPHEN 650 MG: 325 TABLET ORAL at 16:12

## 2024-01-10 RX ADMIN — ACETAMINOPHEN 650 MG: 325 TABLET ORAL at 08:34

## 2024-01-10 RX ADMIN — AMPICILLIN SODIUM AND SULBACTAM SODIUM 3 G: 2; 1 INJECTION, POWDER, FOR SOLUTION INTRAMUSCULAR; INTRAVENOUS at 22:15

## 2024-01-10 SDOH — SOCIAL STABILITY: SOCIAL INSECURITY: DO YOU FEEL ANYONE HAS EXPLOITED OR TAKEN ADVANTAGE OF YOU FINANCIALLY OR OF YOUR PERSONAL PROPERTY?: NO

## 2024-01-10 SDOH — SOCIAL STABILITY: SOCIAL INSECURITY: ABUSE: ADULT

## 2024-01-10 SDOH — SOCIAL STABILITY: SOCIAL INSECURITY: ARE YOU OR HAVE YOU BEEN THREATENED OR ABUSED PHYSICALLY, EMOTIONALLY, OR SEXUALLY BY ANYONE?: NO

## 2024-01-10 SDOH — SOCIAL STABILITY: SOCIAL INSECURITY: DO YOU FEEL UNSAFE GOING BACK TO THE PLACE WHERE YOU ARE LIVING?: NO

## 2024-01-10 SDOH — SOCIAL STABILITY: SOCIAL INSECURITY: HAVE YOU HAD THOUGHTS OF HARMING ANYONE ELSE?: NO

## 2024-01-10 SDOH — SOCIAL STABILITY: SOCIAL INSECURITY: DOES ANYONE TRY TO KEEP YOU FROM HAVING/CONTACTING OTHER FRIENDS OR DOING THINGS OUTSIDE YOUR HOME?: NO

## 2024-01-10 SDOH — SOCIAL STABILITY: SOCIAL INSECURITY: HAS ANYONE EVER THREATENED TO HURT YOUR FAMILY OR YOUR PETS?: NO

## 2024-01-10 SDOH — SOCIAL STABILITY: SOCIAL INSECURITY: WERE YOU ABLE TO COMPLETE ALL THE BEHAVIORAL HEALTH SCREENINGS?: YES

## 2024-01-10 SDOH — SOCIAL STABILITY: SOCIAL INSECURITY: ARE THERE ANY APPARENT SIGNS OF INJURIES/BEHAVIORS THAT COULD BE RELATED TO ABUSE/NEGLECT?: NO

## 2024-01-10 ASSESSMENT — ACTIVITIES OF DAILY LIVING (ADL)
HEARING - RIGHT EAR: FUNCTIONAL
ASSISTIVE_DEVICE: WALKER;EYEGLASSES
ADEQUATE_TO_COMPLETE_ADL: YES
FEEDING YOURSELF: INDEPENDENT
DRESSING YOURSELF: NEEDS ASSISTANCE
PATIENT'S MEMORY ADEQUATE TO SAFELY COMPLETE DAILY ACTIVITIES?: YES
TOILETING: NEEDS ASSISTANCE
GROOMING: NEEDS ASSISTANCE
LACK_OF_TRANSPORTATION: NO
HEARING - LEFT EAR: FUNCTIONAL
LACK_OF_TRANSPORTATION: NO
WALKS IN HOME: NEEDS ASSISTANCE
JUDGMENT_ADEQUATE_SAFELY_COMPLETE_DAILY_ACTIVITIES: NO
BATHING: NEEDS ASSISTANCE

## 2024-01-10 ASSESSMENT — COGNITIVE AND FUNCTIONAL STATUS - GENERAL
MOBILITY SCORE: 17
TURNING FROM BACK TO SIDE WHILE IN FLAT BAD: A LITTLE
DRESSING REGULAR UPPER BODY CLOTHING: A LITTLE
MOBILITY SCORE: 17
MOVING TO AND FROM BED TO CHAIR: A LITTLE
PERSONAL GROOMING: A LITTLE
HELP NEEDED FOR BATHING: A LITTLE
WALKING IN HOSPITAL ROOM: A LITTLE
STANDING UP FROM CHAIR USING ARMS: A LITTLE
PATIENT BASELINE BEDBOUND: NO
DRESSING REGULAR LOWER BODY CLOTHING: A LITTLE
MOVING TO AND FROM BED TO CHAIR: A LITTLE
MOVING FROM LYING ON BACK TO SITTING ON SIDE OF FLAT BED WITH BEDRAILS: A LITTLE
TOILETING: A LITTLE
MOVING FROM LYING ON BACK TO SITTING ON SIDE OF FLAT BED WITH BEDRAILS: A LITTLE
STANDING UP FROM CHAIR USING ARMS: A LITTLE
PERSONAL GROOMING: A LITTLE
DAILY ACTIVITIY SCORE: 19
CLIMB 3 TO 5 STEPS WITH RAILING: A LOT
DAILY ACTIVITIY SCORE: 19
CLIMB 3 TO 5 STEPS WITH RAILING: A LOT
TURNING FROM BACK TO SIDE WHILE IN FLAT BAD: A LITTLE
TOILETING: A LITTLE
DRESSING REGULAR LOWER BODY CLOTHING: A LITTLE
HELP NEEDED FOR BATHING: A LITTLE
WALKING IN HOSPITAL ROOM: A LITTLE
DRESSING REGULAR UPPER BODY CLOTHING: A LITTLE

## 2024-01-10 ASSESSMENT — PAIN SCALES - WONG BAKER: WONGBAKER_NUMERICALRESPONSE: HURTS LITTLE MORE

## 2024-01-10 ASSESSMENT — LIFESTYLE VARIABLES
HOW OFTEN DO YOU HAVE 6 OR MORE DRINKS ON ONE OCCASION: NEVER
PRESCIPTION_ABUSE_PAST_12_MONTHS: NO
HOW MANY STANDARD DRINKS CONTAINING ALCOHOL DO YOU HAVE ON A TYPICAL DAY: PATIENT DOES NOT DRINK
SKIP TO QUESTIONS 9-10: 1
AUDIT-C TOTAL SCORE: 0
AUDIT-C TOTAL SCORE: 0
HOW OFTEN DO YOU HAVE A DRINK CONTAINING ALCOHOL: NEVER
SUBSTANCE_ABUSE_PAST_12_MONTHS: NO

## 2024-01-10 ASSESSMENT — ENCOUNTER SYMPTOMS
CHILLS: 1
CARDIOVASCULAR NEGATIVE: 1
LIGHT-HEADEDNESS: 1
FATIGUE: 1
EYES NEGATIVE: 1
HEADACHES: 1
DIZZINESS: 1
WEAKNESS: 0
FEVER: 1
FACIAL SWELLING: 1
JOINT SWELLING: 1
TREMORS: 0
PSYCHIATRIC NEGATIVE: 1
GASTROINTESTINAL NEGATIVE: 1
DIAPHORESIS: 1
ARTHRALGIAS: 1
RESPIRATORY NEGATIVE: 1
HEMATOLOGIC/LYMPHATIC NEGATIVE: 1

## 2024-01-10 ASSESSMENT — PATIENT HEALTH QUESTIONNAIRE - PHQ9
1. LITTLE INTEREST OR PLEASURE IN DOING THINGS: NOT AT ALL
SUM OF ALL RESPONSES TO PHQ9 QUESTIONS 1 & 2: 1
2. FEELING DOWN, DEPRESSED OR HOPELESS: SEVERAL DAYS

## 2024-01-10 ASSESSMENT — PAIN - FUNCTIONAL ASSESSMENT
PAIN_FUNCTIONAL_ASSESSMENT: 0-10
PAIN_FUNCTIONAL_ASSESSMENT: 0-10

## 2024-01-10 ASSESSMENT — PAIN DESCRIPTION - ORIENTATION: ORIENTATION: RIGHT

## 2024-01-10 ASSESSMENT — PAIN SCALES - GENERAL
PAINLEVEL_OUTOF10: 0 - NO PAIN
PAINLEVEL_OUTOF10: 3
PAINLEVEL_OUTOF10: 4

## 2024-01-10 ASSESSMENT — PAIN DESCRIPTION - LOCATION: LOCATION: HIP

## 2024-01-10 ASSESSMENT — PAIN DESCRIPTION - DESCRIPTORS: DESCRIPTORS: ACHING

## 2024-01-10 NOTE — CONSULTS
"  Wound Care Consult     Visit Date: 1/10/2024      Patient Name: Maggie Carrington         MRN: [id number]           YOB: 1960     Reason for Consult:   Sacral wound     Wound History:   Present on admission     Pertinent Labs:   Albumin   Date Value Ref Range Status   01/06/2024 3.1 (L) 3.4 - 5.0 g/dL Final       Wound Assessment:  Wound 01/10/24 Sacrum (Active)   Wound Image   01/10/24 0255       Wound Team Summary Assessment:  64 y/o CF was admitted after a fall with head trauma and NSTEMI.  She was observed to have skin change to her buttock, admit photo reviewed and status d/w her RN, Radha.  The skin change to buttock appears as an area of local scarring only  -- in discussion with patient she reports that while hospitalized very ill in 2022 she did develop a \"bedsore\" which has long since healed.  As scar tissue does not have the tensile strength of normal tissue, recommend a protective sacral Mepilex.  She denies any other skin changes and is able to reposition self ad antwon in her [facility] bed.  She does have a colostomy, pouching system fully intact.  She normally does her own care, was encouraged to call if assist or supplies needed.      Wound Team Plan:   Protective sacral Mepilex.     Gwyn Bello RN  1/10/2024  3:50 PM        "

## 2024-01-10 NOTE — CONSULTS
"Consults  History Of Present Illness:    Maggie Carrington is a 63 y.o. female from home with h/o rheumatoid arthritis on Orencia with significant debilitation (uses walker as well as wheelchair at home), cardiac arrest in the setting of septic shock s/p urgent colectomy d/t diverticulitis 8/2022, paroxysmal a.fib (diagnosed 8/2022 in the setting of sepsis and not on OAC) presenting to the ER on 1/5/2024 with worsening dental pain.  She was having facial edema/pain and toothache that started 2 weeks ago. She was evaluated by her dentist on 1/2/2023 and started on Augmentin which she did not tolerate will with subsequent nausea and vomiting.  She was unable to eat/drink for nearly a week.  On 1/5/2024 she was getting up to use the bathroom when she started to feel lightheaded.  Felt the need to sit down abruptly, however, she lost consciousness prior to sitting down.  She has ecchymosis to chin and bilateral arms. Her  called 911.  BP in the ER 83/44.  CT maxillofacial at [place] shows left mandibular abscess.  She was evaluated by ENT at [place] and recommended to have biopsy to differentiate infections process vs.  Possible neoplastic process.  Blood cultures are negative to date. Troponins were assessed in the ER and upon admit and found to be elevated 6, 94, 127, 170, 75, 16.  CTA chest is negative for PE, shows parenchymal patchy nodule infiltrates bilaterally and areas of left cavitary lesions.  Denies chest pain, pressure. Denies feeling SOB at rest or with exertion.      Last Recorded Vitals:  Vitals:    01/10/24 0305 01/10/24 0500 01/10/24 0830   BP: 126/76 124/75 122/72   BP Location: Right arm Right arm    Patient Position: Lying Lying    Pulse: 74 76 79   Resp: 18     Temp: 36.2 °C (97.2 °F) 36.4 °C (97.5 °F) 36.6 °C (97.9 °F)   TempSrc: Temporal Temporal    SpO2: 94% 93% 96%   Weight: 52.2 kg (115 lb)     Height: 1.651 m (5' 5\")         Last Labs:  CBC - 1/10/2024:  6:07 AM  5.5 9.8 177    32.4      CMP " - 1/9/2024:  6:32 AM  7.9 5.8 21 --- 0.3   _ 3.1 13 47      PTT - 1/10/2024:  6:07 AM  1.2   13.3 32     Troponin I, High Sensitivity   Date/Time Value Ref Range Status   01/10/2024 04:20 AM 16 (H) 0 - 13 ng/L Final   01/06/2024 08:12 AM 75 (HH) 0 - 13 ng/L Final     Comment:     Previous result verified on 1/5/2024 1212 on specimen/case [id number] called with component TRPHS for procedure Troponin I, High Sensitivity with value 94 ng/L.   01/05/2024 03:26  (HH) 0 - 13 ng/L Final     Comment:     Previous result verified on 1/5/2024 1212 on specimen/case [id number] called with component TRPHS for procedure Troponin I, High Sensitivity with value 94 ng/L.     BNP   Date/Time Value Ref Range Status   01/10/2024 04:20  (H) 0 - 99 pg/mL Final     Hemoglobin A1C   Date/Time Value Ref Range Status   08/09/2022 04:22 AM 5.1 4.0 - 6.0 % Final     Comment:     Hemoglobin A1C levels are related to mean blood glucose during the   preceding 2-3 months. The relationship table below may be used as a   general guide. Each 1% increase in HGB A1C is a reflection of an   increase in mean glucose of approximately 30 mg/dl.   Reference: Diabetes Care, volume 29, supplement 1 Jan. 2006                        HGB A1C ................. Approx. Mean Glucose   _______________________________________________   6%   ...............................  120 mg/dl   7%   ...............................  150 mg/dl   8%   ...............................  180 mg/dl   9%   ...............................  210 mg/dl   10%  ...............................  240 mg/dl  Performed at [address] [address]     07/26/2018 10:17 AM 5.2 4.0 - 6.0 % Final     Comment:     Hemoglobin A1C levels are related to mean blood glucose during the   preceding 2-3 months. The relationship table below may be used as a   general guide. Each 1% increase in HGB A1C is a reflection of an   increase in mean glucose of approximately  30 mg/dl.   Reference: Diabetes Care, volume 29, supplement 1 Jan. 2006                        HGB A1C ................. Approx. Mean Glucose   _______________________________________________   6%   ...............................  120 mg/dl   7%   ...............................  150 mg/dl   8%   ...............................  180 mg/dl   9%   ...............................  210 mg/dl   10%  ...............................  240 mg/dl  Performed at [address] [address]       LDL Calculated   Date/Time Value Ref Range Status   04/28/2021 10:08 AM 69 65 - 130 MG/DL Final   07/26/2018 10:17 AM 85 65 - 130 MG/DL Final      Last I/O:  I/O last 3 completed shifts:  In: - (0 mL/kg)   Out: 300 (5.8 mL/kg) [Urine:300 (0.2 mL/kg/hr)]  Weight: 52.2 kg     Past Cardiology Tests (Last 3 Years):  EKG:  NSR, HR 69bpm    Echo:  Transthoracic Echo (TTE) Complete 01/08/2024  CONCLUSIONS:   1. Left ventricular systolic function is normal with a 60-65% estimated ejection fraction.   2. Mitral valve prolapse with mild mitral regurgitation.   3. The mitral valve is normal in structure. 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      Past Medical History:  She has a past medical history of Anxiety, Arthritis, Encounter for screening for other viral diseases (02/08/2016), Muscle weakness (generalized), Pain in unspecified joint, and Pneumonia, unspecified organism (11/10/2015).    Past Surgical History:  She has a past surgical history that includes CT guided percutaneous peritoneal or retroperitoneal fluid collection drainage (01/31/2022); CT guided percutaneous peritoneal or retroperitoneal fluid collection drainage (08/10/2022); and Colon surgery.      Social History:  She reports that she has never smoked. She has never been exposed to tobacco smoke. She has never used smokeless tobacco. She reports that she does not drink alcohol and does not use drugs.    Family History:  Family History   Problem Relation  Name Age of Onset    Diabetes Mother      Other (psoriatic arthritis) Father          Allergies:  Duloxetine    Inpatient Medications:  Scheduled medications   Medication Dose Route Frequency    ampicillin-sulbactam  3 g intravenous q6h    aspirin  81 mg oral Daily    atorvastatin  80 mg oral Nightly    azithromycin  500 mg intravenous q24h    metoprolol tartrate  25 mg oral BID    polyethylene glycol  17 g oral Daily    ticagrelor  90 mg oral BID     PRN medications   Medication    acetaminophen     Continuous Medications   Medication Dose Last Rate     Outpatient Medications:  Current Outpatient Medications   Medication Instructions    aspirin-acetaminophen-caffeine (Excedrin Extra Strength) 250-250-65 mg tablet 2 tablets, oral, Every 6 hours PRN    metoprolol succinate XL (TOPROL-XL) 50 mg, oral, 2 times daily    Orencia ClickJect 125 mg, subcutaneous, Weekly    Orencia 125 mg, subcutaneous, Weekly       Physical Exam:  Constitutional: Pleasant, Awake/Alert/Oriented to person place and time. No distress  Head: Atraumatic, Normocephalic  Eyes: EOMI. LIZZETTE  Neck: Enlarged neck circumference, No JVD  Cardiovascular: Regular rate and rhythm, S1, S2. No extra heart sounds or murmurs  Respiratory: Clear to auscultation bilaterally. No wheezing, rales or rhonchi. Good chest wall expansion  Abdomen: Soft, Nontender, Obese. Bowel sounds appreciated  Neurological: CNII-XII intact. Sensation grossly intact  Extremities: Warm and dry. No acute rashes and lesions  Psychiatric: Appropriate mood and affect       Assessment/Plan   63 y.o. female from home with h/o rheumatoid arthritis on Orencia with significant debilitation (uses walker as well as wheelchair at home), cardiac arrest in the setting of septic shock s/p urgent colectomy d/t diverticulitis 8/2022, paroxysmal a.fib (diagnosed 8/2022 in the setting of sepsis and not on OAC) presenting to the ER on 1/5/2024  s/p syncope in the setting of dental abscess unable to  tolerate Augmentin d/t GI upset.  Incidentally found to have elevated troponin.      Assessment:  Left mandibular abscess   Syncope-- suspect d/t dehydration in the setting of N/V/poor oral intake   Elevated troponin-- demand ischemia    Plan:  Elevated troponin in the setting of hypotension after persistent N/V and poor oral intake for at least 1 week in the setting of left mandibular abscess.  EF is preserved on echo 1/8/2024. This is not a picture of ACS.  Stop heparin and Brilinta. Continue asa, atorvastatin, and metoprolol.  Should further dental procedure be deemed necessary, patient may proceed with low risk for perioperative cardiovascular event.       Peripheral IV 01/05/24 20 G Left Wrist (Active)   Site Assessment Clean;Dry;Intact 01/10/24 0700   Dressing Type Transparent 01/10/24 0700   Line Status Flushed 01/10/24 0700   Dressing Status Clean;Dry 01/10/24 0700   Number of days: 5       Code Status:  Full Code      SHANKAR Jones-CNP

## 2024-01-10 NOTE — DISCHARGE SUMMARY
Discharge Diagnosis  Elevated troponin    Issues Requiring Follow-Up  Transfer to [facility]     Test Results Pending At Discharge  Pending Labs       Order Current Status    Blood Culture Preliminary result    Blood Culture Preliminary result            Hospital Course  Transfer to [facility]     Pertinent Physical Exam At Time of Discharge  Physical Exam    Home Medications     Medication List      ASK your doctor about these medications     Excedrin Extra Strength 250-250-65 mg tablet; Generic drug:   aspirin-acetaminophen-caffeine   metoprolol succinate XL 50 mg 24 hr tablet; Commonly known as:   Toprol-XL; Take 1 tablet (50 mg) by mouth 2 times a day.   * Orencia 125 mg/mL injection; Generic drug: abatacept   * Orencia ClickJect 125 mg/mL auto-injector auto-injection; Generic   drug: abatacept; Inject 1 mL (125 mg) under the skin 1 (one) time per   week.  * This list has 2 medication(s) that are the same as other medications   prescribed for you. Read the directions carefully, and ask your doctor or   other care provider to review them with you.       Outpatient Follow-Up  Future Appointments   Date Time Provider Department [place]   2/21/2024  1:15 PM Tesfaye Corona MD [id number] [facility]   6/25/2024 10:40 AM Esperanza Smith MD [id number] [facility]       SHANKAR Jones-CNP

## 2024-01-10 NOTE — PROGRESS NOTES
Patient: Maggie Carrington  Room/bed: 137/137-A  Admitted on: 1/10/2024    Age: 63 y.o.   Gender: female  Code Status:  Full Code   Admitting Dx: NSTEMI (non-ST elevated myocardial infarction) (CMS/[facility]) [I21.4]    MRN: [id number]  PCP: Janet Navarro DO       Subjective   Seen and examined in her room this AM. Awake and alert. States oral pain much less and minimal discomfort at this time. She denies chest pain, breathing difficulties, abdominal pain, N/V/D/C, fever, or chills.  No feeling of dizziness but given her weakness is unable to get OOB without assistance.     Objective    Physical Exam   Constitutional: A&O x 3; NAD; calm and cooperative; thin with arthritic changes  Eyes: EOM's intact  HEENT: Normocephalic, Atraumatic. Oral mucosa moist.   Neck: Supple. No JVD, lymphadenopathy.   Lungs: CTAB with fair air movement. Respirations even and unlabored on room air.   Heart: RRR  Abdomen: Soft, non-tender, non-distended, +BS  MS/Extremities: HILLIARD equally x 4. No edema. Peripheral pulses intact bilaterally. Significant joint deformities related to RA.  Neuro: A&O x3; no focal deficits; gross motor and sensation intact.   Skin: Warm and dry. No rashes or lesions  Psych: Normal affect.      Temp:  [36.2 °C (97.2 °F)-36.6 °C (97.9 °F)] 36.6 °C (97.9 °F)  Heart Rate:  [74-79] 79  Resp:  [18] 18  BP: (112-126)/(69-76) 122/72    Vitals:    01/10/24 0305   Weight: 52.2 kg (115 lb)       I/Os    Intake/Output Summary (Last 24 hours) at 1/10/2024 1431  Last data filed at 1/10/2024 1415  Gross per 24 hour   Intake 100 ml   Output 1150 ml   Net -1050 ml       Labs:   Results from last 72 hours   Lab Units 01/09/24  0632 01/08/24  0646   SODIUM mmol/L 140 143   POTASSIUM mmol/L 3.8 4.1   CHLORIDE mmol/L 110* 113*   CO2 mmol/L 22 21   BUN mg/dL 5* 7   CREATININE mg/dL 0.46* 0.47*   GLUCOSE mg/dL 95 87   CALCIUM mg/dL 7.9* 7.9*   ANION GAP mmol/L 12 13   EGFR mL/min/1.73m*2 >90 >90      Results from last 72 hours   Lab Units  01/10/24  0607 01/09/24  0632 01/08/24  0646   WBC AUTO x10*3/uL 5.5 4.6 2.7*   HEMOGLOBIN g/dL 9.8* 10.5* 10.0*   HEMATOCRIT % 32.4* 37.4 34.0*   PLATELETS AUTO x10*3/uL 177 162 159      Lab Results   Component Value Date    CALCIUM 7.9 (L) 01/09/2024    PHOS 3.0 08/17/2022      Lab Results   Component Value Date    CRP 0.78 04/25/2023        Micro/ID:   No results found for the last 90 days.    ID  Lab Results   Component Value Date    BLOODCULT No growth at 2 days 01/07/2024       Images:  ECG 12 lead  Normal sinus rhythm  Nonspecific ST abnormality  Abnormal ECG  When compared with ECG of 05-JAN-2024 17:51, (unconfirmed)  T wave inversion no longer evident in Anterior leads     CT Angio of Chest:  No evidence of PE.       The lungs are hyperinflated.       Parenchymal patchy nodular infiltrates bilaterally. There are areas   of cavitation within the infiltrates.       The left lower lobe consolidation on the previous exam and the left   pleural fluid has largely resolved.     Echocardiogram:   1. Left ventricular systolic function is normal with a 60-65% estimated ejection fraction.   2. Mitral valve prolapse with mild mitral regurgitation.   3. The mitral valve is normal in structure. 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    Meds    Scheduled medications  ampicillin-sulbactam, 3 g, intravenous, q6h  aspirin, 81 mg, oral, Daily  atorvastatin, 80 mg, oral, Nightly  azithromycin, 500 mg, intravenous, q24h  metoprolol tartrate, 25 mg, oral, BID  polyethylene glycol, 17 g, oral, Daily  ticagrelor, 90 mg, oral, BID      Continuous medications     PRN medications  PRN medications: acetaminophen     Assessment and Plan    Maggie Carrington is a 63 y.o. female with a medical history of RA, A.Fib, and HTN who was transferred from [facility] for cardiology and OMFS evaluations. Had a syncopal episode with fall and found to have troponemia and dental abscess.     NSTEMI  -Troponin 94>127>170>75  -Possible Type  II MI  -Heparin gtt was initiated at OSH and completed 48 hours of therapy  -Continue with medical mangement: ASA, Statin, Brilinita  -Echocardiogram: LVEF 60-65%; no WMA; normal pattern of diastolic filling  -Cardiology consulted - await recommendations  -On telemetry  -No ACS symptoms at this time    Dental Abscess  -On Unasyn, Azithromycin  -OMFS consulted - await recommendations  -No acute pain issues on exam  -Follow cultures - preliminary BC negative  -Afebrile. WBC 5.5.     CV: HTN, A.Fib  -No CHF on Echocardiogram  -On ASA, statin, Metoprolol  -Monitoring on telemetry    Rheumatoid Arthritis/OA  -On Orencia injections  -Supportive care  -PT/OT to assess  -Ambulates with walker    DVT Prophylaxis  -Hold until evaluated by OMFS    Disposition  -Plan of care discussed with attending, Dr. Bernabe.   -Home with Home Care when medically cleared.       Carmen Carpenter, APRN-CNP

## 2024-01-10 NOTE — H&P
"Subjective   HPI    Maggie Carrington is a 63 y.o. female who was transferred from [facility] for cardiology and OMFS evaluation. She has a past medical history of arthritis, diverticulitis s/p colectomy complicated by volvulus which was managed surgically now has ostomy bag, and atrial fibrillation 2/2 \"heart failure\" in 8/2022 which was a complication of the volvulus surgery. The patient initially presented to [facility] for a fall caused by syncope complicated by head trauma. Workup at [facility] showed dental abscess and troponemia (92->170->75) without evidence for intracranial pathology. At [facility], she was diagnosed with an NSTEMI, started on heparin, aspirin, loaded with ticagrelor, and a high intensity statin. She was taken off of heparin on 1/8. She was transferred to [facility] on 1/10. At this time, she endorses mild headache (2/10), her normal hip pain which she attributes to arthritis, no chest pain, no dyspnea, and no pedal edema.    Review of Systems   Constitutional:  Positive for chills, diaphoresis, fatigue and fever.   HENT:  Positive for dental problem, facial swelling and mouth sores.    Eyes: Negative.    Respiratory: Negative.     Cardiovascular: Negative.    Gastrointestinal: Negative.    Genitourinary: Negative.    Musculoskeletal:  Positive for arthralgias and joint swelling.   Skin: Negative.    Neurological:  Positive for dizziness, syncope, light-headedness and headaches. Negative for tremors and weakness.   Hematological: Negative.    Psychiatric/Behavioral: Negative.       ED Course   Vitals - /76 (BP Location: Right arm, Patient Position: Lying)   Pulse 74   Temp 36.2 °C (97.2 °F) (Temporal)   Resp 18   Wt 52.2 kg (115 lb)   SpO2 94%   Labs -   Lab Results   Component Value Date    WBC 4.6 01/09/2024    HGB 10.5 (L) 01/09/2024    HCT 37.4 01/09/2024    MCV 98 01/09/2024     01/09/2024     Lab Results   Component Value Date    GLUCOSE 95 01/09/2024    CALCIUM 7.9 (L) 01/09/2024     " 01/09/2024    K 3.8 01/09/2024    CO2 22 01/09/2024     (H) 01/09/2024    BUN 5 (L) 01/09/2024    CREATININE 0.46 (L) 01/09/2024     Lab Results   Component Value Date    TROPHS 75 (HH) 01/06/2024     Lab Results   Component Value Date    DDIMERVTE 5,833 (H) 01/06/2024     Imaging -   No orders to display      Interventions -   Medications   ampicillin-sulbactam (Unasyn) in sodium chloride 0.9 % 100 mL 3 g (has no administration in time range)   aspirin EC tablet 81 mg (has no administration in time range)   atorvastatin (Lipitor) tablet 80 mg (has no administration in time range)   azithromycin (Zithromax) in dextrose 5 % in water (D5W) 250 mL  mg (has no administration in time range)   metoprolol tartrate (Lopressor) tablet 25 mg (has no administration in time range)   polyethylene glycol (Glycolax, Miralax) packet 17 g (has no administration in time range)   ticagrelor (Brilinta) tablet 90 mg (has no administration in time range)   heparin (porcine) injection 3,150 Units (has no administration in time range)   heparin 25,000 Units in dextrose 5% 250 mL (100 Units/mL) infusion (premix) (has no administration in time range)   heparin (porcine) injection 1,500-3,000 Units (has no administration in time range)       Past Medical History     Past Medical History:   Diagnosis Date    Anxiety     Arthritis     Encounter for screening for other viral diseases 02/08/2016    Need for hepatitis B screening test    Muscle weakness (generalized)     Muscle weakness    Pain in unspecified joint     Multiple joint pain    Pneumonia, unspecified organism 11/10/2015    Community acquired pneumonia        Surgical History     Past Surgical History:   Procedure Laterality Date    COLON SURGERY      CT GUIDED PERCUTANEOUS PERITONEAL OR RETROPERITONEAL FLUID COLLECTION DRAINAGE  01/31/2022    CT GUIDED PERCUTANEOUS PERITONEAL OR RETROPERITONEAL FLUID COLLECTION DRAINAGE [facility]    CT GUIDED PERCUTANEOUS  PERITONEAL OR RETROPERITONEAL FLUID COLLECTION DRAINAGE  08/10/2022    CT GUIDED PERCUTANEOUS PERITONEAL OR RETROPERITONEAL FLUID COLLECTION DRAINAGE [facility]     Family History     Family History   Problem Relation Name Age of Onset    Diabetes Mother      Other (psoriatic arthritis) Father         Social History     Social History     Tobacco Use    Smoking status: Never     Passive exposure: Never    Smokeless tobacco: Never   Vaping Use    Vaping Use: Never used   Substance Use Topics    Alcohol use: Never    Drug use: Never       Allergies     Allergies   Allergen Reactions    Duloxetine GI Upset        Meds    Scheduled medications  ampicillin-sulbactam, 3 g, intravenous, q6h  aspirin, 81 mg, oral, Daily  atorvastatin, 80 mg, oral, Nightly  azithromycin, 500 mg, intravenous, q24h  heparin, 60 Units/kg, intravenous, Once  metoprolol tartrate, 25 mg, oral, BID  polyethylene glycol, 17 g, oral, Daily  ticagrelor, 90 mg, oral, BID      Continuous medications  heparin, 0-4,000 Units/hr      PRN medications  PRN medications: heparin     Objective   Vitals:    01/10/24 0305   BP: 126/76   Pulse: 74   Resp: 18   Temp: 36.2 °C (97.2 °F)   SpO2: 94%       Physical Examination:  Physical Exam  Constitutional:       General: She is awake. She is not in acute distress.     Appearance: Normal appearance. She is cachectic. She is ill-appearing. She is not toxic-appearing or diaphoretic.   HENT:      Head: Normocephalic.      Jaw: Tenderness, swelling and pain on movement present.      Nose: Nose normal.      Mouth/Throat:      Mouth: Mucous membranes are moist.      Dentition: Abnormal dentition. Dental tenderness, gingival swelling, dental caries, dental abscesses and gum lesions present.      Pharynx: Oropharynx is clear.   Eyes:      Conjunctiva/sclera: Conjunctivae normal.      Pupils: Pupils are equal, round, and reactive to light.   Cardiovascular:      Rate and Rhythm: Normal rate and regular rhythm.       Pulses: Normal pulses.      Heart sounds: Normal heart sounds.   Pulmonary:      Effort: Pulmonary effort is normal.      Breath sounds: Normal breath sounds. No wheezing, rhonchi or rales.   Abdominal:      General: Abdomen is flat. Bowel sounds are normal. There is no distension.      Palpations: Abdomen is soft.      Tenderness: There is no abdominal tenderness. There is no guarding or rebound.      Comments: Ostomy appears clean and non-infected   Musculoskeletal:         General: Swelling, tenderness and deformity present.      Right hand: Swelling, deformity, tenderness and bony tenderness present. Decreased range of motion.      Left hand: Swelling, deformity, tenderness and bony tenderness present. Decreased range of motion.      Cervical back: Normal range of motion.      Right lower leg: No edema.      Left lower leg: No edema.   Skin:     General: Skin is warm and dry.      Capillary Refill: Capillary refill takes 2 to 3 seconds.      Findings: Bruising and lesion present. No rash.   Neurological:      General: No focal deficit present.      Mental Status: She is alert and oriented to person, place, and time.      Cranial Nerves: No cranial nerve deficit.      Sensory: No sensory deficit.      Motor: No weakness.      Coordination: Coordination normal.     I/Os  No intake or output data in the 24 hours ending 01/10/24 0406    Labs:   Results from last 72 hours   Lab Units 01/09/24  0632 01/08/24  0646 01/07/24  0726   SODIUM mmol/L 140 143 139   POTASSIUM mmol/L 3.8 4.1 3.4*   CHLORIDE mmol/L 110* 113* 108*   CO2 mmol/L 22 21 25   BUN mg/dL 5* 7 6   CREATININE mg/dL 0.46* 0.47* 0.44*   GLUCOSE mg/dL 95 87 94   CALCIUM mg/dL 7.9* 7.9* 7.8*   ANION GAP mmol/L 12 13 9*   EGFR mL/min/1.73m*2 >90 >90 >90      Results from last 72 hours   Lab Units 01/09/24  0632 01/08/24  0646 01/07/24  0726   WBC AUTO x10*3/uL 4.6 2.7* 3.3*   HEMOGLOBIN g/dL 10.5* 10.0* 10.4*   HEMATOCRIT % 37.4 34.0* 34.0*   PLATELETS AUTO  x10*3/uL 162 159 176      Lab Results   Component Value Date    CALCIUM 7.9 (L) 01/09/2024    PHOS 3.0 08/17/2022     0   Lab Value Date/Time    TROPHS 75 (HH) 01/06/2024 0812    TROPHS 170 (HH) 01/05/2024 1526    TROPHS 127 (HH) 01/05/2024 1307    TROPHS 94 (HH) 01/05/2024 1123    TROPHS 6 08/01/2022 2026     Micro/ID:     Lab Results   Component Value Date    BLOODCULT No growth at 1 day 01/07/2024       Images      Transthoracic Echo (TTE) Complete    Narrative  [facility], [address] [place] [address]  Tel [phone] and Fax [phone]    TRANSTHORACIC ECHOCARDIOGRAM REPORT      Patient Name:      ANN MARIE MENA        Subhash Physician:    [id number] Marilee Graves MD  Study Date:        1/8/2024             Ordering Provider:    [id number] KIMBERLY KUMAR  MRN/PID:           [id number]             Fellow:  Accession#:        [id number]         Nurse:  Date of Birth/Age: 1960 / 63      Sonographer:          Holly Olson RDCS  years  Gender:            F                    Additional Staff:  Height:            165.10 cm            Admit Date:  Weight:            44.91 kg             Admission Status:     Inpatient -  Routine  BSA:               1.47 m2              Encounter#:           [id number]  Department Location:  [facility]  Blood Pressure: 107 /67 mmHg    Study Type:    TRANSTHORACIC ECHO (TTE) COMPLETE  Diagnosis/ICD: Non ST elevation (NSTEMI) myocardial infarction-I21.4  Indication:    NSTEMI  CPT Code:      Echo Complete w Full Doppler-[id number]    Patient History:  Pertinent History: Syncope and HTN. Elevated troponin.    Study Detail: The following Echo studies were performed: M-Mode, Doppler, 2D and  color flow. Technically challenging study due to body habitus. The  patient was awake.      PHYSICIAN INTERPRETATION:  Left Ventricle: The left ventricular systolic function is normal, with an estimated ejection fraction of 60-65%. There are no regional wall motion  abnormalities. The left ventricular cavity size is normal. Spectral Doppler shows a normal pattern of left ventricular diastolic filling.  Left Atrium: The left atrium is normal in size.  Right Ventricle: The right ventricle is normal in size. There is normal right ventricular global systolic function.  Right Atrium: The right atrium is normal in size.  Aortic Valve: The aortic valve is trileaflet. There is no evidence of aortic valve regurgitation. The peak instantaneous gradient of the aortic valve is 4.1 mmHg.  Mitral Valve: The mitral valve is normal in structure. 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 There is mitral valve prolapse. There is mild mitral valve regurgitation.  Tricuspid Valve: The tricuspid valve is structurally normal. There is mild tricuspid regurgitation.  Pulmonic Valve: The pulmonic valve is not well visualized. There is physiologic pulmonic valve regurgitation.  Pericardium: There is no pericardial effusion noted.  Aorta: The aortic root was not well visualized.      CONCLUSIONS:  1. Left ventricular systolic function is normal with a 60-65% estimated ejection fraction.  2. Mitral valve prolapse with mild mitral regurgitation.  3. The mitral valve is normal in structure. 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    QUANTITATIVE DATA SUMMARY:  2D MEASUREMENTS:  Normal Ranges:  Ao Root d:     2.90 cm   (2.0-3.7cm)  LAs:           2.90 cm   (2.7-4.0cm)  IVSd:          0.79 cm   (0.6-1.1cm)  LVPWd:         0.88 cm   (0.6-1.1cm)  LVIDd:         3.06 cm   (3.9-5.9cm)  LVIDs:         2.11 cm  LV Mass Index: 44.2 g/m2  LV % FS        31.0 %    LA VOLUME:  Normal Ranges:  LA Vol A4C:        24.3 ml    (22+/-6mL/m2)  LA Vol A2C:        23.0 ml  LA Vol BP:         24.7 ml  LA Vol Index A4C:  16.6ml/m2  LA Vol Index A2C:  15.7 ml/m2  LA Vol Index BP:   16.8 ml/m2  LA Area A4C:       10.0 cm2  LA Area A2C:       9.3 cm2  LA Major Axis A4C: 3.5 cm  LA Major Axis A2C: 3.2 cm  LA Volume Index:   15.0  ml/m2    RA VOLUME BY A/L METHOD:  Normal Ranges:  RA Vol A4C:        15.1 ml    (8.3-19.5ml)  RA Vol Index A4C:  10.3 ml/m2  RA Area A4C:       8.0 cm2  RA Major Axis A4C: 3.6 cm    M-MODE MEASUREMENTS:  Normal Ranges:  Ao Root:       2.20 cm   (2.0-3.7cm)  IVSd:          0.64 cm   (0.6-1.1cm)  LVPWd:         0.76 cm   (0.6-1.1cm)  LVIDd:         4.45 cm   (3.9-5.9cm)  LVIDs:         3.39 cm  LV Mass Index: 64.2 g/m2  LV % FS        23.8 %    AORTA MEASUREMENTS:  Normal Ranges:  Asc Ao, d: 3.30 cm (2.1-3.4cm)    LV SYSTOLIC FUNCTION BY 2D PLANIMETRY (MOD):  Normal Ranges:  EF-A4C View: 60.1 % (>=55%)  EF-A2C View: 61.8 %  EF-Biplane:  61.8 %    LV DIASTOLIC FUNCTION:  Normal Ranges:  MV Peak E:        0.94 m/s   (0.7-1.2 m/s)  MV Peak A:        0.61 m/s   (0.42-0.7 m/s)  E/A Ratio:        1.54       (1.0-2.2)  MV e'             0.15 m/s   (>8.0)  MV lateral e'     0.16 m/s  MV medial e'      0.14 m/s  MV A Dur:         90.00 msec  E/e' Ratio:       6.25       (<8.0)  PulmV Sys Silvio:    76.50 cm/s  PulmV Boles Silvio:   43.50 cm/s  PulmV S/D Silvio:    1.80  PulmV A Revs Silvio: 33.50 cm/s  PulmV A Revs Dur: 98.00 msec    MITRAL VALVE:  Normal Ranges:  MV DT: 179 msec (150-240msec)    AORTIC VALVE:  Normal Ranges:  AoV Vmax:      1.01 m/s (<=1.7m/s)  AoV Peak P.1 mmHg (<20mmHg)  LVOT Max Silvio:  0.66 m/s (<=1.1m/s)  LVOT VTI:      12.40 cm  LVOT Diameter: 2.00 cm  (1.8-2.4cm)  AoV Area,Vmax: 2.06 cm2 (2.5-4.5cm2)      RIGHT VENTRICLE:  RV Basal 2.10 cm  RV Mid   1.70 cm  RV Major 5.6 cm  TAPSE:   13.3 mm  RV s'    0.17 m/s    TRICUSPID VALVE/RVSP:  Normal Ranges:  Peak TR Velocity: 1.54 m/s  RV Syst Pressure: 12.5 mmHg (< 30mmHg)  IVC Diam:         1.60 cm    PULMONIC VALVE:  Normal Ranges:  PV Max Silvio: 0.7 m/s  (0.6-0.9m/s)  PV Max P.1 mmHg    Pulmonary Veins:  PulmV A Revs Dur: 98.00 msec  PulmV A Revs Silvio: 33.50 cm/s  PulmV Boles Silvio:   43.50 cm/s  PulmV S/D Silvio:    1.80  PulmV Sys Silvio:    76.50 cm/s      [address]  "Marilee Graves MD  Electronically signed on 1/8/2024 at 9:09:25 PM        ** Final **     Assessment/Plan   Maggie Carrington is a 63 y.o. female presenting for transferred from [facility] for cardiology and OMFS evaluation. She has a past medical history of arthritis, diverticulitis s/p colectomy complicated by volvulus which was managed surgically now has ostomy bag, and atrial fibrillation 2/2 \"heart failure\" in 8/2022 which was a complication of the volvulus surgery. Patient initially presented with syncope and fall, found to have troponemia and dental abscess. Syncope may be cardiogenic in the setting of infection, however troponemia as a result of a type II MI cannot be excluded.    Acute medical issues  # Troponemia  # Syncope  :: NSTEMI vs atrial fibrillation vs type II MI  - Troponins  - BNP  - Lactate  - VBG  - EKG  - Telemetry  - Continue home metoprolol 25mg  - Ticagrolor 90mg BID (loaded at [facility]), atorvastatin 80mg, ASA 81mg, low intensity heparin  - NPO pending cardiology eval  - Consult cardiology    # Dental abscess  - Unasyn  - Azithromycin  - Blood cultures pending  - Consult OMFS    Chronic medical issues  # Arthritis  - Patient on orencia once per week  - Consider opiate pain management while inpatient    # HTN  - Continue metoprolol 25mg  -- Decreased from home dose of 50    F None; PO once cardio evals  E Replete as needed  N NPO pending cardio eval  G Senna PRN  Code status: Full Code  NOK: Arthur Carrington (spouse) [phone]    Dispo: 62 y/o F transferred from [facility] for OMFS and cardiology eval. Syncope with mild head trauma, found to have troponemia. Diagnosed as NSTEMI transferred for further management. ELOS < 48 hours.     Raman Urbina MD         "

## 2024-01-10 NOTE — PROGRESS NOTES
01/10/24 1254   Discharge Planning   Living Arrangements Spouse/significant other  (spouse currently at [facility]   Support Systems Spouse/significant other   Assistance Needed A&OX3; independent with walker/rolator/wheelchair; drives short distances; room air at baseline and currently on room air   Type of Residence Private residence   Number of Stairs to Enter Residence 0   Number of Stairs Within Residence 0   Do you have animals or pets at home? No   Who is requesting discharge planning? Provider   Home or Post Acute Services In home services   Patient expects to be discharged to: home with [facility] if needed   Does the patient need discharge transport arranged? No   Financial Resource Strain   How hard is it for you to pay for the very basics like food, housing, medical care, and heating? Not hard   Housing Stability   In the last 12 months, was there a time when you were not able to pay the mortgage or rent on time? N   In the last 12 months, how many places have you lived? 1   In the last 12 months, was there a time when you did not have a steady place to sleep or slept in a shelter (including now)? N   Transportation Needs   In the past 12 months, has lack of transportation kept you from medical appointments or from getting medications? no   In the past 12 months, has lack of transportation kept you from meetings, work, or from getting things needed for daily living? No        01/12/24 0657   Discharge Planning   Living Arrangements Spouse/significant other  (spouse currently at [facility]   Support Systems Spouse/significant other   Assistance Needed A&OX3; independent with walker/rolator/wheelchair; drives short distances; room air at baseline and currently on room air   Type of Residence Private residence   Number of Stairs to Enter Residence 0   Number of Stairs Within Residence 0   Do you have animals or pets at home? No   Who is requesting discharge planning? Provider   [place]  or Post Acute Services In home services   Financial Resource Strain   How hard is it for you to pay for the very basics like food, housing, medical care, and heating? Not hard   Housing Stability   In the last 12 months, was there a time when you were not able to pay the mortgage or rent on time? N   In the last 12 months, how many places have you lived? 1   In the last 12 months, was there a time when you did not have a steady place to sleep or slept in a shelter (including now)? N   Transportation Needs   In the past 12 months, has lack of transportation kept you from medical appointments or from getting medications? no   In the past 12 months, has lack of transportation kept you from meetings, work, or from getting things needed for daily living? No     01/10/2024 1256pm  Spoke with patient and she stated she wouldn't want to go to snf if recommended. Would rather go home with [facility]. Will continue to follow.     01/11/2024 1546pm  Therapy recommendation is snf. Spoke with patient and she is willing to go to snf and [facility]. [facility] SNF list given to patient for preference. Will follow up for snf preference tomorrow morning and start precert tomorrow ASAP.     01/12/2024 0658am  Patient preference for snf is 1)[facility] (they don't accept [facility] but will send referral to [facility] to review for Clary Franco) 2)[facility] 3)[place]. Referrals sent via [facility] to facilities for review.     01/12/2024 0936am  [facility] out of network with patient's insurance. [facility] has accepted and have started precert with [facility]. Patient made aware of [facility] acceptance.     01/12/2024 1130am  [facility] is able to accept patient prior to precert today. Transport confirmed for 4pm stretcher via [facility]. Patient aware of [facility] acceptance and 4pm transport. Angie BRADSHAW on floor aware of dc and 4pm stretcher via [facility]. RN report #  [phone]. Unit secretary given completed ambulance form. 'After visit summary' and goldenrod sent to facility and they were made aware of transport time. Heidy Castillo at [facility] to complete 7000 in HENs. Todays covid result needs to be negative for [facility] to accept and result needs to be sent by myself when available (currently pending with lab).

## 2024-01-11 LAB
ANION GAP SERPL CALC-SCNC: 11 MMOL/L (ref 10–20)
BUN SERPL-MCNC: 6 MG/DL (ref 6–23)
CALCIUM SERPL-MCNC: 8 MG/DL (ref 8.6–10.3)
CHLORIDE SERPL-SCNC: 104 MMOL/L (ref 98–107)
CO2 SERPL-SCNC: 25 MMOL/L (ref 21–32)
CREAT SERPL-MCNC: 0.53 MG/DL (ref 0.5–1.05)
EGFRCR SERPLBLD CKD-EPI 2021: >90 ML/MIN/1.73M*2
ERYTHROCYTE [DISTWIDTH] IN BLOOD BY AUTOMATED COUNT: 11.8 % (ref 11.5–14.5)
GLUCOSE SERPL-MCNC: 92 MG/DL (ref 74–99)
HCT VFR BLD AUTO: 32.6 % (ref 36–46)
HGB BLD-MCNC: 10.2 G/DL (ref 12–16)
MCH RBC QN AUTO: 27.7 PG (ref 26–34)
MCHC RBC AUTO-ENTMCNC: 31.3 G/DL (ref 32–36)
MCV RBC AUTO: 89 FL (ref 80–100)
NRBC BLD-RTO: 0 /100 WBCS (ref 0–0)
PLATELET # BLD AUTO: 190 X10*3/UL (ref 150–450)
POTASSIUM SERPL-SCNC: 3.3 MMOL/L (ref 3.5–5.3)
RBC # BLD AUTO: 3.68 X10*6/UL (ref 4–5.2)
SODIUM SERPL-SCNC: 137 MMOL/L (ref 136–145)
WBC # BLD AUTO: 5 X10*3/UL (ref 4.4–11.3)

## 2024-01-11 PROCEDURE — 36415 COLL VENOUS BLD VENIPUNCTURE: CPT | Performed by: NURSE PRACTITIONER

## 2024-01-11 PROCEDURE — 1200000002 HC GENERAL ROOM WITH TELEMETRY DAILY

## 2024-01-11 PROCEDURE — 2500000001 HC RX 250 WO HCPCS SELF ADMINISTERED DRUGS (ALT 637 FOR MEDICARE OP): Performed by: NURSE PRACTITIONER

## 2024-01-11 PROCEDURE — 2500000004 HC RX 250 GENERAL PHARMACY W/ HCPCS (ALT 636 FOR OP/ED)

## 2024-01-11 PROCEDURE — 2500000001 HC RX 250 WO HCPCS SELF ADMINISTERED DRUGS (ALT 637 FOR MEDICARE OP)

## 2024-01-11 PROCEDURE — 2500000001 HC RX 250 WO HCPCS SELF ADMINISTERED DRUGS (ALT 637 FOR MEDICARE OP): Performed by: FAMILY MEDICINE

## 2024-01-11 PROCEDURE — 97165 OT EVAL LOW COMPLEX 30 MIN: CPT | Mod: GO

## 2024-01-11 PROCEDURE — 82374 ASSAY BLOOD CARBON DIOXIDE: CPT | Performed by: NURSE PRACTITIONER

## 2024-01-11 PROCEDURE — 2500000004 HC RX 250 GENERAL PHARMACY W/ HCPCS (ALT 636 FOR OP/ED): Performed by: NURSE PRACTITIONER

## 2024-01-11 PROCEDURE — 99232 SBSQ HOSP IP/OBS MODERATE 35: CPT | Performed by: NURSE PRACTITIONER

## 2024-01-11 PROCEDURE — 85027 COMPLETE CBC AUTOMATED: CPT | Performed by: NURSE PRACTITIONER

## 2024-01-11 PROCEDURE — 97161 PT EVAL LOW COMPLEX 20 MIN: CPT | Mod: GP

## 2024-01-11 RX ORDER — CLINDAMYCIN HYDROCHLORIDE 300 MG/1
300 CAPSULE ORAL 3 TIMES DAILY
Status: DISCONTINUED | OUTPATIENT
Start: 2024-01-11 | End: 2024-01-13 | Stop reason: HOSPADM

## 2024-01-11 RX ORDER — POTASSIUM CHLORIDE 20 MEQ/1
40 TABLET, EXTENDED RELEASE ORAL ONCE
Status: COMPLETED | OUTPATIENT
Start: 2024-01-11 | End: 2024-01-11

## 2024-01-11 RX ORDER — ACETAMINOPHEN 500 MG
5 TABLET ORAL NIGHTLY PRN
Status: DISCONTINUED | OUTPATIENT
Start: 2024-01-11 | End: 2024-01-13 | Stop reason: HOSPADM

## 2024-01-11 RX ORDER — ONDANSETRON HYDROCHLORIDE 2 MG/ML
4 INJECTION, SOLUTION INTRAVENOUS EVERY 6 HOURS PRN
Status: DISCONTINUED | OUTPATIENT
Start: 2024-01-11 | End: 2024-01-13 | Stop reason: HOSPADM

## 2024-01-11 RX ADMIN — POTASSIUM CHLORIDE 40 MEQ: 1500 TABLET, EXTENDED RELEASE ORAL at 09:52

## 2024-01-11 RX ADMIN — CLINDAMYCIN HYDROCHLORIDE 300 MG: 300 CAPSULE ORAL at 15:32

## 2024-01-11 RX ADMIN — ASPIRIN 81 MG: 81 TABLET, COATED ORAL at 08:41

## 2024-01-11 RX ADMIN — ACETAMINOPHEN 650 MG: 325 TABLET ORAL at 20:58

## 2024-01-11 RX ADMIN — METOPROLOL TARTRATE 25 MG: 25 TABLET, FILM COATED ORAL at 20:58

## 2024-01-11 RX ADMIN — ACETAMINOPHEN 650 MG: 325 TABLET ORAL at 03:45

## 2024-01-11 RX ADMIN — Medication 5 MG: at 22:22

## 2024-01-11 RX ADMIN — METOPROLOL TARTRATE 25 MG: 25 TABLET, FILM COATED ORAL at 08:42

## 2024-01-11 RX ADMIN — ACETAMINOPHEN 650 MG: 325 TABLET ORAL at 12:57

## 2024-01-11 RX ADMIN — AZITHROMYCIN MONOHYDRATE 500 MG: 500 INJECTION, POWDER, LYOPHILIZED, FOR SOLUTION INTRAVENOUS at 11:27

## 2024-01-11 RX ADMIN — AMPICILLIN SODIUM AND SULBACTAM SODIUM 3 G: 2; 1 INJECTION, POWDER, FOR SOLUTION INTRAMUSCULAR; INTRAVENOUS at 03:44

## 2024-01-11 RX ADMIN — AMPICILLIN SODIUM AND SULBACTAM SODIUM 3 G: 2; 1 INJECTION, POWDER, FOR SOLUTION INTRAMUSCULAR; INTRAVENOUS at 09:52

## 2024-01-11 RX ADMIN — ATORVASTATIN CALCIUM 80 MG: 80 TABLET, FILM COATED ORAL at 20:58

## 2024-01-11 RX ADMIN — CLINDAMYCIN HYDROCHLORIDE 300 MG: 300 CAPSULE ORAL at 20:59

## 2024-01-11 ASSESSMENT — COGNITIVE AND FUNCTIONAL STATUS - GENERAL
MOVING TO AND FROM BED TO CHAIR: A LITTLE
MOBILITY SCORE: 16
TOILETING: A LITTLE
PERSONAL GROOMING: A LITTLE
DAILY ACTIVITIY SCORE: 16
WALKING IN HOSPITAL ROOM: TOTAL
HELP NEEDED FOR BATHING: A LOT
EATING MEALS: A LITTLE
DRESSING REGULAR LOWER BODY CLOTHING: A LOT
STANDING UP FROM CHAIR USING ARMS: A LITTLE
DRESSING REGULAR LOWER BODY CLOTHING: A LOT
CLIMB 3 TO 5 STEPS WITH RAILING: TOTAL
DRESSING REGULAR UPPER BODY CLOTHING: A LITTLE
STANDING UP FROM CHAIR USING ARMS: A LITTLE
MOVING TO AND FROM BED TO CHAIR: A LITTLE
EATING MEALS: A LITTLE
DAILY ACTIVITIY SCORE: 16
MOVING FROM LYING ON BACK TO SITTING ON SIDE OF FLAT BED WITH BEDRAILS: A LITTLE
TOILETING: A LITTLE
PERSONAL GROOMING: A LITTLE
DRESSING REGULAR UPPER BODY CLOTHING: A LITTLE
WALKING IN HOSPITAL ROOM: TOTAL
CLIMB 3 TO 5 STEPS WITH RAILING: TOTAL
HELP NEEDED FOR BATHING: A LOT
MOBILITY SCORE: 15

## 2024-01-11 ASSESSMENT — PAIN - FUNCTIONAL ASSESSMENT
PAIN_FUNCTIONAL_ASSESSMENT: 0-10

## 2024-01-11 ASSESSMENT — PAIN SCALES - GENERAL
PAINLEVEL_OUTOF10: 8
PAINLEVEL_OUTOF10: 3
PAINLEVEL_OUTOF10: 2
PAINLEVEL_OUTOF10: 8
PAINLEVEL_OUTOF10: 8

## 2024-01-11 ASSESSMENT — PAIN DESCRIPTION - LOCATION: LOCATION: HIP

## 2024-01-11 ASSESSMENT — ACTIVITIES OF DAILY LIVING (ADL)
BATHING_ASSISTANCE: MODERATE
ADL_ASSISTANCE: INDEPENDENT

## 2024-01-11 NOTE — CARE PLAN
The patient's goals for the shift include      The clinical goals for the shift include Strengthening      Problem: Pain - Adult  Goal: Verbalizes/displays adequate comfort level or baseline comfort level  Outcome: Progressing     Problem: Safety - Adult  Goal: Free from fall injury  Outcome: Progressing     Problem: Discharge Planning  Goal: Discharge to home or other facility with appropriate resources  Outcome: Progressing     Problem: Chronic Conditions and Co-morbidities  Goal: Patient's chronic conditions and co-morbidity symptoms are monitored and maintained or improved  Outcome: Progressing     Problem: Skin  Goal: Decreased wound size/increased tissue granulation at next dressing change  Outcome: Progressing  Flowsheets (Taken 1/11/2024 0418)  Decreased wound size/increased tissue granulation at next dressing change:   Protective dressings over bony prominences   Promote sleep for wound healing  Goal: Participates in plan/prevention/treatment measures  Outcome: Progressing  Goal: Prevent/manage excess moisture  Outcome: Progressing  Flowsheets (Taken 1/11/2024 0418)  Prevent/manage excess moisture: Moisturize dry skin  Goal: Prevent/minimize sheer/friction injuries  Outcome: Progressing  Flowsheets (Taken 1/11/2024 0418)  Prevent/minimize sheer/friction injuries:   HOB 30 degrees or less   Turn/reposition every 2 hours/use positioning/transfer devices  Goal: Promote/optimize nutrition  Outcome: Progressing  Flowsheets (Taken 1/11/2024 0418)  Promote/optimize nutrition:   Consume > 50% meals/supplements   Monitor/record intake including meals  Goal: Promote skin healing  Outcome: Progressing  Flowsheets (Taken 1/11/2024 0418)  Promote skin healing:   Assess skin/pad under line(s)/device(s)   Protective dressings over bony prominences   Turn/reposition every 2 hours/use positioning/transfer devices

## 2024-01-11 NOTE — PROGRESS NOTES
Occupational Therapy    Evaluation    Patient Name: Maggie Carrington  MRN: [id number]  Today's Date: 1/11/2024  Time Calculation  Start Time: 1436  Stop Time: 1452  Time Calculation (min): 16 min    Assessment  IP OT Assessment  OT Assessment: Pt deconditioned; presents with decreased endurance needed for safe ADL completion and functional mobility. Recommend continued skilled OT to maximize pt safety and independence. Discussed POC, educated patient on benefits of SNF vs HHC  Prognosis: Good  Barriers to Discharge: None  Evaluation/Treatment Tolerance: Patient limited by fatigue  Medical Staff Made Aware: Yes  End of Session Communication: Bedside nurse  End of Session Patient Position: Up in chair, Alarm on  Plan:  Treatment Interventions: ADL retraining, Functional transfer training, Endurance training, Compensatory technique education  OT Frequency: 3 times per week  OT Discharge Recommendations: Moderate intensity level of continued care  OT Recommended Transfer Status: Assist of 1  OT - OK to Discharge: Yes (per OT POC)    Subjective   Current Problem:  1. NSTEMI (non-ST elevated myocardial infarction) (CMS/HCC)          General:  General  Reason for Referral: Referred to OT following NSTEMI, gen weakness, decreased mobility  Referred By: Sloan Bernabe MD  Past Medical History Relevant to Rehab: rheumatoid arthritis with significant debilitation  cardiac arrest in the setting of septic shock s/p urgent colectomy d/t diverticulitis 8/2022, paroxysmal a-fib  Co-Treatment: PT  Co-Treatment Reason: to maximize pt safety and outcomes  Prior to Session Communication: Bedside nurse  Patient Position Received: Bed, 3 rail up  General Comment: Pt pleasant, agreeable to therapy. Has severe RA in bilat hands and feet. Requests that, if needed, she would prefer [place] for additional rehab. IV, purewick in place  Precautions:  Medical Precautions: Fall precautions    Pain:  Pain Assessment  Pain Assessment:  0-10  Pain Score: 8  Pain Type: Acute pain  Pain Location: Hip  Pain Orientation: Right    Objective   Cognition:  Overall Cognitive Status: Within Functional Limits    Home Living:  Type of Home: House  Lives With: Spouse  Home Adaptive Equipment: Walker rolling or standard, Wheelchair-manual, Long-handled sponge, Reacher (rollator)  Home Layout: One level  Home Access: Ramped entrance  Bathroom Shower/Tub: Tub/shower unit  Bathroom Equipment: Grab bars in shower, Tub transfer bench  Home Living Comments: Spouse currenty at SNF, should return home soon   Prior Function:  Level of [place]: Independent with ADLs and functional transfers, Independent with homemaking with ambulation  Prior Function Comments: uses rollator at home, w/c in community. Ostomy bag in place; reports learning adaptive ADL/IADL strategies from previous OT visits.    ADL:  Eating Assistance: Minimal  Grooming Assistance: Minimal  Bathing Assistance: Moderate  UE Dressing Assistance: Independent  LE Dressing Assistance: Moderate  Functional Assistance: Minimal  ADL Comments: Dependent for socks, pt reports not wearing them at baseline. Other ADL performance anticipated d/t current clinical presentation.  Activity Tolerance:  Endurance: Tolerates less than 10 min exercise, no significant change in vital signs  Bed Mobility/Transfers: Bed Mobility  Bed Mobility: Yes  Bed Mobility 1  Bed Mobility 1: Supine to sitting  Level of Assistance 1: Modified independent  Bed Mobility Comments 1: with use of bed rail    Transfers  Transfer: Yes  Transfer 1  Transfer From 1: Sit to  Transfer to 1: Stand  Technique 1: Sit to stand, Stand to sit  Transfer Device 1: Walker  Transfer Level of Assistance 1: Contact guard  Trials/Comments 1: with assist to stabilize walker, increased time/effort  Transfers 2  Transfer From 2: Bed to  Transfer to 2: Chair with arms  Technique 2: Stand pivot  Transfer Device 2: Walker  Transfer Level of Assistance 2: Contact  guard  Trials/Comments 2: pt fatigued after completing    Strength:  Strength Comments: EDUARD WFL    Hand Function:  Hand Function  Gross Grasp: Impaired (Pt with severe bilat RA)  Coordination: Impaired (Pt with severe bilat RA)  Extremities: RUE   RUE : Within Functional Limits and LUE   LUE: Within Functional Limits    Outcome Measures: [facility] Daily Activity  Putting on and taking off regular lower body clothing: A lot  Bathing (including washing, rinsing, drying): A lot  Putting on and taking off regular upper body clothing: A little  Toileting, which includes using toilet, bedpan or urinal: A little  Taking care of personal grooming such as brushing teeth: A little  Eating Meals: A little  Daily Activity - Total Score: 16      Education Documentation  Body Mechanics, taught by Mina Castellanos OT at 1/11/2024  3:33 PM.  Learner: Patient  Readiness: Acceptance  Method: Explanation  Response: Verbalizes Understanding    Precautions, taught by Mina Castellanos OT at 1/11/2024  3:33 PM.  Learner: Patient  Readiness: Acceptance  Method: Explanation  Response: Verbalizes Understanding    ADL Training, taught by Mina Castellanos OT at 1/11/2024  3:33 PM.  Learner: Patient  Readiness: Acceptance  Method: Explanation  Response: Verbalizes Understanding    Education Comments  No comments found.      Goals:   Encounter Problems       Encounter Problems (Active)       OT Goals       Pt will increase endurance to tolerate 15min of OOB activity with no more than 1 rest break in order to increase ability to engage in ADL completion.  (Progressing)       Start:  01/11/24    Expected End:  01/25/24            Pt will demo and/or verbalize 2-3 energy conservation techniques to incorporate into functional mobility or ADL to improve performance and increase independence.  (Progressing)       Start:  01/11/24    Expected End:  01/25/24            Pt will demo increased functional mobility to tolerate tasks necessary to  complete ADL routine.  (Progressing)       Start:  01/11/24    Expected End:  01/25/24            Pt will demo ADL routine and meaningful daily activities using modifications as needed  (Progressing)       Start:  01/11/24    Expected End:  01/25/24            Pt will tolerate 10min stand during functional task completion with no more than 1 rest break in order to increase endurance for functional task completion.  (Progressing)       Start:  01/11/24    Expected End:  01/25/24

## 2024-01-11 NOTE — PROGRESS NOTES
Patient: Maggie Carrington  Room/bed: 137/137-A  Admitted on: 1/10/2024    Age: 63 y.o.   Gender: female  Code Status:  Full Code   Admitting Dx: NSTEMI (non-ST elevated myocardial infarction) (CMS/[facility]) [I21.4]    MRN: [id number]  PCP: Janet Navarro DO       Subjective   Seen and examined in her room this AM. Awake and alert. States oral pain much less and minimal discomfort at this time. She denies chest pain, breathing difficulties, abdominal pain, N/V/D/C, fever, or chills.      Objective    Physical Exam   Constitutional: A&O x 3; NAD; calm and cooperative; thin with arthritic changes  Eyes: EOM's intact  HEENT: Normocephalic, Atraumatic. Oral mucosa moist.   Neck: Supple. No JVD, lymphadenopathy.   Lungs: CTAB with fair air movement. Respirations even and unlabored on room air.   Heart: RRR  Abdomen: Soft, non-tender, non-distended, +BS. RLQ ostomy patent.   MS/Extremities: HILLIARD equally x 4. No edema. Peripheral pulses intact bilaterally. Significant joint deformities related to RA.  Neuro: A&O x3; no focal deficits; gross motor and sensation intact.   Skin: Warm and dry. No rashes or lesions  Psych: Normal affect.      Temp:  [35.7 °C (96.3 °F)-37 °C (98.6 °F)] 35.7 °C (96.3 °F)  Heart Rate:  [70-77] 77  Resp:  [18] 18  BP: (108-119)/(69-77) 118/77    Vitals:    01/10/24 0305   Weight: 52.2 kg (115 lb)       I/Os    Intake/Output Summary (Last 24 hours) at 1/11/2024 1506  Last data filed at 1/11/2024 1355  Gross per 24 hour   Intake 490 ml   Output 1550 ml   Net -1060 ml         Labs:   Results from last 72 hours   Lab Units 01/11/24  0548 01/09/24  0632   SODIUM mmol/L 137 140   POTASSIUM mmol/L 3.3* 3.8   CHLORIDE mmol/L 104 110*   CO2 mmol/L 25 22   BUN mg/dL 6 5*   CREATININE mg/dL 0.53 0.46*   GLUCOSE mg/dL 92 95   CALCIUM mg/dL 8.0* 7.9*   ANION GAP mmol/L 11 12   EGFR mL/min/1.73m*2 >90 >90        Results from last 72 hours   Lab Units 01/11/24  0548 01/10/24  0607 01/09/24  0632   WBC AUTO x10*3/uL 5.0 5.5  4.6   HEMOGLOBIN g/dL 10.2* 9.8* 10.5*   HEMATOCRIT % 32.6* 32.4* 37.4   PLATELETS AUTO x10*3/uL 190 177 162        Lab Results   Component Value Date    CALCIUM 8.0 (L) 01/11/2024    PHOS 3.0 08/17/2022      Lab Results   Component Value Date    CRP 0.78 04/25/2023        Micro/ID:   No results found for the last 90 days.    ID  Lab Results   Component Value Date    BLOODCULT No growth at 3 days 01/07/2024       Images:  ECG 12 lead  Normal sinus rhythm  Normal ECG  When compared with ECG of 01-AUG-2022 20:29,  Previous ECG has undetermined rhythm, needs review  T wave amplitude has decreased in Lateral leads  See ED provider note for full interpretation and clinical correlation  Confirmed by Erika Miranda ([id number]) on 1/10/2024 5:07:08 PM  ECG 12 lead  Normal sinus rhythm  Nonspecific T wave abnormality  Abnormal ECG  When compared with ECG of 05-JAN-2024 12:52, (unconfirmed)  ST now depressed in Anterior leads  Nonspecific T wave abnormality now evident in Inferior leads  T wave inversion now evident in Anterior leads  See ED provider note for full interpretation and clinical correlation  Confirmed by lEvira Lomax ([id number]) on 1/10/2024 4:36:29 PM  ECG 12 lead  Normal sinus rhythm  Nonspecific ST abnormality  Abnormal ECG  When compared with ECG of 05-JAN-2024 17:51, (unconfirmed)  T wave inversion no longer evident in Anterior leads     CT Angio of Chest:  No evidence of PE.       The lungs are hyperinflated.       Parenchymal patchy nodular infiltrates bilaterally. There are areas   of cavitation within the infiltrates.       The left lower lobe consolidation on the previous exam and the left   pleural fluid has largely resolved.     Echocardiogram:   1. Left ventricular systolic function is normal with a 60-65% estimated ejection fraction.   2. Mitral valve prolapse with mild mitral regurgitation.   3. The mitral valve is normal in structure. 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    Meds     Scheduled medications  aspirin, 81 mg, oral, Daily  atorvastatin, 80 mg, oral, Nightly  clindamycin, 300 mg, oral, TID  metoprolol tartrate, 25 mg, oral, BID  polyethylene glycol, 17 g, oral, Daily      Continuous medications     PRN medications  PRN medications: acetaminophen, ondansetron     Assessment and Plan    Maggie Carrington is a 63 y.o. female with a medical history of RA, A.Fib, and HTN who was [facility] from [facility] for cardiology and OMFS evaluations. Had a syncopal episode with fall and found to have troponemia and dental abscess.     NSTEMI  -Troponin 94>127>170>75  -Heparin gtt was initiated at OSH and completed 48 hours of therapy  -Continue with medical mangement: ASA, Statin, Brilinita  -Echocardiogram: LVEF 60-65%; no WMA; normal pattern of diastolic filling  -Cardiology consulted - demand ischemia in setting of abscess, syncope, hypotension   -Brilinta stopped.   -On telemetry  -No ACS symptoms at this time    Dental Abscess  -On Unasyn. Completetd Azithromycin  -OMFS consulted - await recommendations  -No acute pain issues on exam  -Follow cultures - preliminary BC negative  -Afebrile. WBC 5.5.   -Had acute N/V/D with Augmentin. Will switch antibiotic to Clindamycin today and see if tolerates and if she does, will discharge home with enough to complete 10 day course of therapy  -Can follow up with dentist for further management    CV: HTN, A.Fib  -No CHF on Echocardiogram  -On ASA, statin, Metoprolol  -Monitoring on telemetry    Rheumatoid Arthritis/OA  -On Orencia injections  -Supportive care  -PT/OT to assess for homegoing needs.   -Ambulates with walker    DVT Prophylaxis  -Hold until evaluated by OMFS    Fluids/Electrolytes/Nutrition  -Laboratory data reviewed.   -Electrolytes stable. Hypokalemia - replaced.   -No nutritional concerns at this time.      Disposition  -Plan of care discussed with attending, Dr. Bernabe.   -Home with Home Care/SNF pending PT/OT evaluations.        Carmen Carpenter, APRN-CNP

## 2024-01-11 NOTE — PROGRESS NOTES
Physical Therapy    Physical Therapy Evaluation    Patient Name: Maggie Carrington  MRN: [id number]  Today's Date: 1/11/2024   Time Calculation  Start Time: 1435  Stop Time: 1452  Time Calculation (min): 17 min    Assessment/Plan   PT Assessment  PT Assessment Results: Decreased strength, Decreased endurance, Impaired balance, Decreased mobility  Rehab Prognosis: Good  Evaluation/Treatment Tolerance: Patient tolerated treatment well, Patient limited by fatigue  Medical Staff Made Aware: Yes  Strengths: Ability to acquire knowledge, Housing layout  Barriers to Participation:  ( is currently at SNF, no additional support within the home)  End of Session Communication: Bedside nurse, Care Coordinator  Assessment Comment: Patient completes mobility well however is limited d/t decreased activity tolerance. At this time discussed recs for mod intensity PT intervention  End of Session Patient Position: Up in chair, Alarm on  IP OR SWING BED PT PLAN  Inpatient or Swing Bed: Inpatient  PT Plan  Treatment/Interventions: Bed mobility, Transfer training, Gait training, Strengthening, Endurance training  PT Plan: Skilled PT  PT Frequency: 3 times per week  PT Discharge Recommendations: Moderate intensity level of continued care  Equipment Recommended upon Discharge: Wheeled walker (owns)  PT Recommended Transfer Status: Assist x1  PT - OK to Discharge: Yes (per PT POC)      Subjective   General Visit Information:  General  Reason for Referral: Impaired functional mobility; NSTEMI  Referred By: Sloan Bernabe  Past Medical History Relevant to Rehab: rheumatoid arthritis with significant debilitation  cardiac arrest in the setting of septic shock s/p urgent colectomy d/t diverticulitis 8/2022, paroxysmal a-fib  Family/Caregiver Present: No  Co-Treatment: OT  Co-Treatment Reason: Optimize patients functional mobility and safety  Prior to Session Communication: Bedside nurse  Patient Position Received: Bed, 3 rail up, Alarm  on  General Comment: Patient is pleasant, cooperative and agreeable to therapy evals  Home Living:  Home Living  Type of Home: House  Lives With: Spouse  Home Adaptive Equipment: Walker rolling or standard, Wheelchair-manual (Rollator)  Home Layout: One level  Home Access: Ramped entrance  Bathroom Shower/Tub: Tub/shower unit  Bathroom Equipment: Grab bars in shower, Tub transfer bench  Home Living Comments: Spouse is currently at SNF following accident  Prior Level of Function:  Prior Function Per Pt/Caregiver Report  Level of [place]: Independent with ADLs and functional transfers, Independent with homemaking with ambulation  ADL Assistance: Independent  Homemaking Assistance: Independent  Ambulatory Assistance: Independent (Rollator household distances, w/c community distances)  Precautions:  Precautions  Medical Precautions: Fall precautions (IV, pronounced RA deformity B hands)  Vital Signs:       Objective   Pain:  Pain Assessment  Pain Assessment: 0-10  Pain Score: 8  Pain Type: Acute pain  Pain Location: Hip  Pain Orientation: Right  Pain Interventions: Repositioned  Cognition:  Cognition  Overall Cognitive Status: Within Functional Limits  Orientation Level: Oriented X4    General Assessments:    Activity Tolerance  Endurance: Tolerates less than 10 min exercise, no significant change in vital signs    Sensation  Light Touch: No apparent deficits    Strength  Strength Comments: R hip 3+/5, R knee and ankle 4-/5; L LE 4/5  Strength  Strength Comments: R hip 3+/5, R knee and ankle 4-/5; L LE 4/5    Coordination  Movements are Fluid and Coordinated: Yes    Postural Control  Postural Control: Within Functional Limits    Static Sitting Balance  Static Sitting-Balance Support: No upper extremity supported, Feet supported  Static Sitting-Level of Assistance: Independent    Static Standing Balance  Static Standing-Balance Support: Bilateral upper extremity supported  Static Standing-Level of Assistance:  Contact guard  Functional Assessments:       Bed Mobility  Bed Mobility: Yes  Bed Mobility 1  Bed Mobility 1: Supine to sitting  Level of Assistance 1: Close supervision    Transfers  Transfer: Yes  Transfer 1  Transfer From 1: Sit to, Stand to  Transfer to 1: Sit, Stand  Technique 1: Sit to stand, Stand to sit  Transfer Device 1: Walker  Transfer Level of Assistance 1: Contact guard    Ambulation/Gait Training  Ambulation/Gait Training Performed: Yes  Ambulation/Gait Training 1  Surface 1: Level tile  Device 1: Rolling walker  Assistance 1: Contact guard  Quality of Gait 1:  (flexed posture, decreased step length/height)  Comments/Distance (ft) 1: 3' (gait distance limited d/t decreased activity tolerance)    Outcome Measures:  [facility] Basic Mobility  Turning from your back to your side while in a flat bed without using bedrails: None  Moving from lying on your back to sitting on the side of a flat bed without using bedrails: None  Moving to and from bed to chair (including a wheelchair): A little  Standing up from a chair using your arms (e.g. wheelchair or bedside chair): A little  To walk in hospital room: Total  Climbing 3-5 steps with railing: Total  Basic Mobility - Total Score: 16    Encounter Problems       Encounter Problems (Active)       Balance       STG - Maintains dynamic standing balance without upper extremity support x 5'  (Progressing)       Start:  01/11/24    Expected End:  01/25/24               Mobility       STG - Patient will ambulate with walker 75' mod I  (Progressing)       Start:  01/11/24    Expected End:  01/25/24               Transfers       STG - Patient will perform bed mobility independently  (Progressing)       Start:  01/11/24    Expected End:  01/25/24            STG - Patient will transfer sit to and from stand mod I  (Progressing)       Start:  01/11/24    Expected End:  01/25/24                   Education Documentation  Precautions, taught by Linda Gaviria, PT at 1/11/2024   3:36 PM.  Learner: Patient  Readiness: Acceptance  Method: Explanation  Response: Verbalizes Understanding    Body Mechanics, taught by Linda Gaviria, PT at 1/11/2024  3:36 PM.  Learner: Patient  Readiness: Acceptance  Method: Explanation  Response: Verbalizes Understanding    Mobility Training, taught by Linda Gaviria, PT at 1/11/2024  3:36 PM.  Learner: Patient  Readiness: Acceptance  Method: Explanation  Response: Verbalizes Understanding    Education Comments  No comments found.

## 2024-01-11 NOTE — CARE PLAN
The patient's goals for the shift include  have less nausea.    The clinical goals for the shift include patient will remain pain free through out the shift.    Over the shift, the patient did not make progress toward the following goals. Barriers to progression include acute illness. Recommendations to address these barriers include antibiotics.

## 2024-01-12 VITALS
HEART RATE: 54 BPM | OXYGEN SATURATION: 95 % | DIASTOLIC BLOOD PRESSURE: 58 MMHG | HEIGHT: 65 IN | RESPIRATION RATE: 18 BRPM | BODY MASS INDEX: 19.16 KG/M2 | SYSTOLIC BLOOD PRESSURE: 91 MMHG | TEMPERATURE: 96.6 F | WEIGHT: 115 LBS

## 2024-01-12 PROBLEM — I21.4 NSTEMI (NON-ST ELEVATED MYOCARDIAL INFARCTION) (MULTI): Status: RESOLVED | Noted: 2024-01-07 | Resolved: 2024-01-12

## 2024-01-12 LAB
ANION GAP SERPL CALC-SCNC: 11 MMOL/L (ref 10–20)
BACTERIA BLD CULT: NORMAL
BACTERIA BLD CULT: NORMAL
BUN SERPL-MCNC: 10 MG/DL (ref 6–23)
CALCIUM SERPL-MCNC: 8.2 MG/DL (ref 8.6–10.3)
CHLORIDE SERPL-SCNC: 106 MMOL/L (ref 98–107)
CO2 SERPL-SCNC: 25 MMOL/L (ref 21–32)
CREAT SERPL-MCNC: 0.57 MG/DL (ref 0.5–1.05)
EGFRCR SERPLBLD CKD-EPI 2021: >90 ML/MIN/1.73M*2
ERYTHROCYTE [DISTWIDTH] IN BLOOD BY AUTOMATED COUNT: 11.9 % (ref 11.5–14.5)
GLUCOSE SERPL-MCNC: 89 MG/DL (ref 74–99)
HCT VFR BLD AUTO: 32.2 % (ref 36–46)
HGB BLD-MCNC: 9.9 G/DL (ref 12–16)
MCH RBC QN AUTO: 27.1 PG (ref 26–34)
MCHC RBC AUTO-ENTMCNC: 30.7 G/DL (ref 32–36)
MCV RBC AUTO: 88 FL (ref 80–100)
NRBC BLD-RTO: 0 /100 WBCS (ref 0–0)
PLATELET # BLD AUTO: 225 X10*3/UL (ref 150–450)
POTASSIUM SERPL-SCNC: 3.9 MMOL/L (ref 3.5–5.3)
RBC # BLD AUTO: 3.65 X10*6/UL (ref 4–5.2)
SARS-COV-2 RNA RESP QL NAA+PROBE: NOT DETECTED
SODIUM SERPL-SCNC: 138 MMOL/L (ref 136–145)
WBC # BLD AUTO: 6.3 X10*3/UL (ref 4.4–11.3)

## 2024-01-12 PROCEDURE — 87635 SARS-COV-2 COVID-19 AMP PRB: CPT | Performed by: STUDENT IN AN ORGANIZED HEALTH CARE EDUCATION/TRAINING PROGRAM

## 2024-01-12 PROCEDURE — 2500000001 HC RX 250 WO HCPCS SELF ADMINISTERED DRUGS (ALT 637 FOR MEDICARE OP)

## 2024-01-12 PROCEDURE — 85027 COMPLETE CBC AUTOMATED: CPT | Performed by: NURSE PRACTITIONER

## 2024-01-12 PROCEDURE — 99239 HOSP IP/OBS DSCHRG MGMT >30: CPT | Performed by: NURSE PRACTITIONER

## 2024-01-12 PROCEDURE — 80048 BASIC METABOLIC PNL TOTAL CA: CPT | Performed by: NURSE PRACTITIONER

## 2024-01-12 PROCEDURE — 2500000001 HC RX 250 WO HCPCS SELF ADMINISTERED DRUGS (ALT 637 FOR MEDICARE OP): Performed by: FAMILY MEDICINE

## 2024-01-12 PROCEDURE — 36415 COLL VENOUS BLD VENIPUNCTURE: CPT | Performed by: NURSE PRACTITIONER

## 2024-01-12 PROCEDURE — 2500000001 HC RX 250 WO HCPCS SELF ADMINISTERED DRUGS (ALT 637 FOR MEDICARE OP): Performed by: NURSE PRACTITIONER

## 2024-01-12 RX ORDER — ONDANSETRON 4 MG/1
4 TABLET, ORALLY DISINTEGRATING ORAL EVERY 8 HOURS PRN
Qty: 20 TABLET | Refills: 0
Start: 2024-01-12

## 2024-01-12 RX ORDER — ASPIRIN 81 MG/1
81 TABLET ORAL DAILY
Start: 2024-01-13 | End: 2024-05-28 | Stop reason: ALTCHOICE

## 2024-01-12 RX ORDER — CLINDAMYCIN HYDROCHLORIDE 300 MG/1
300 CAPSULE ORAL 3 TIMES DAILY
Qty: 9 CAPSULE | Refills: 0
Start: 2024-01-12 | End: 2024-01-15

## 2024-01-12 RX ORDER — POLYETHYLENE GLYCOL 3350 17 G/17G
17 POWDER, FOR SOLUTION ORAL DAILY PRN
Start: 2024-01-12 | End: 2024-05-28 | Stop reason: ALTCHOICE

## 2024-01-12 RX ORDER — ATORVASTATIN CALCIUM 20 MG/1
20 TABLET, FILM COATED ORAL DAILY
Start: 2024-01-12

## 2024-01-12 RX ORDER — ACETAMINOPHEN 325 MG/1
650 TABLET ORAL EVERY 6 HOURS PRN
Qty: 30 TABLET | Refills: 0
Start: 2024-01-12

## 2024-01-12 RX ADMIN — ACETAMINOPHEN 650 MG: 325 TABLET ORAL at 14:58

## 2024-01-12 RX ADMIN — ACETAMINOPHEN 650 MG: 325 TABLET ORAL at 09:17

## 2024-01-12 RX ADMIN — ASPIRIN 81 MG: 81 TABLET, COATED ORAL at 09:17

## 2024-01-12 RX ADMIN — ATORVASTATIN CALCIUM 80 MG: 80 TABLET, FILM COATED ORAL at 20:28

## 2024-01-12 RX ADMIN — Medication 5 MG: at 20:28

## 2024-01-12 RX ADMIN — CLINDAMYCIN HYDROCHLORIDE 300 MG: 300 CAPSULE ORAL at 20:28

## 2024-01-12 RX ADMIN — ACETAMINOPHEN 650 MG: 325 TABLET ORAL at 04:16

## 2024-01-12 RX ADMIN — METOPROLOL TARTRATE 25 MG: 25 TABLET, FILM COATED ORAL at 20:28

## 2024-01-12 RX ADMIN — METOPROLOL TARTRATE 25 MG: 25 TABLET, FILM COATED ORAL at 09:17

## 2024-01-12 RX ADMIN — CLINDAMYCIN HYDROCHLORIDE 300 MG: 300 CAPSULE ORAL at 14:59

## 2024-01-12 RX ADMIN — CLINDAMYCIN HYDROCHLORIDE 300 MG: 300 CAPSULE ORAL at 09:17

## 2024-01-12 RX ADMIN — ACETAMINOPHEN 650 MG: 325 TABLET ORAL at 20:31

## 2024-01-12 ASSESSMENT — COGNITIVE AND FUNCTIONAL STATUS - GENERAL
MOVING FROM LYING ON BACK TO SITTING ON SIDE OF FLAT BED WITH BEDRAILS: A LITTLE
MOVING TO AND FROM BED TO CHAIR: A LITTLE
DAILY ACTIVITIY SCORE: 16
DRESSING REGULAR LOWER BODY CLOTHING: A LOT
EATING MEALS: A LITTLE
PERSONAL GROOMING: A LITTLE
WALKING IN HOSPITAL ROOM: TOTAL
MOBILITY SCORE: 15
DRESSING REGULAR UPPER BODY CLOTHING: A LITTLE
CLIMB 3 TO 5 STEPS WITH RAILING: TOTAL
STANDING UP FROM CHAIR USING ARMS: A LITTLE
TOILETING: A LITTLE
HELP NEEDED FOR BATHING: A LOT

## 2024-01-12 ASSESSMENT — PAIN DESCRIPTION - LOCATION: LOCATION: GENERALIZED

## 2024-01-12 ASSESSMENT — PAIN SCALES - GENERAL
PAINLEVEL_OUTOF10: 3

## 2024-01-12 ASSESSMENT — ACTIVITIES OF DAILY LIVING (ADL): LACK_OF_TRANSPORTATION: NO

## 2024-01-12 NOTE — CARE PLAN
The patient's goals for the shift include  be discharged    The clinical goals for the shift include patient will be discharged to a facilty.    Over the shift, the patient did not make progress toward the following goals. Barriers to progression include none. Recommendations to address these barriers include none.

## 2024-01-12 NOTE — DISCHARGE SUMMARY
Discharge Diagnosis  NSTEMI (non-ST elevated myocardial infarction) (CMS/[facility])    Issues Requiring Follow-Up  Medical management  F/U with dentist     Discharge Meds     Your medication list        START taking these medications        Instructions Last Dose Given Next Dose Due   acetaminophen 325 mg tablet  Commonly known as: Tylenol      Take 2 tablets (650 mg) by mouth every 6 hours if needed for mild pain (1 - 3).       aspirin 81 mg EC tablet  Start taking on: January 13, 2024      Take 1 tablet (81 mg) by mouth once daily. Do not start before January 13, 2024.       atorvastatin 20 mg tablet  Commonly known as: Lipitor      Take 1 tablet (20 mg) by mouth once daily.       clindamycin 300 mg capsule  Commonly known as: Cleocin      Take 1 capsule (300 mg) by mouth 3 times a day for 3 days.       ondansetron ODT 4 mg disintegrating tablet  Commonly known as: Zofran-ODT      Take 1 tablet (4 mg) by mouth every 8 hours if needed for nausea or vomiting.       polyethylene glycol 17 gram packet  Commonly known as: Glycolax, Miralax      Take 17 g by mouth once daily as needed (constipation).              CHANGE how you take these medications        Instructions Last Dose Given Next Dose Due   Orencia ClickJect 125 mg/mL auto-injector auto-injection  Generic drug: abatacept  What changed: Another medication with the same name was removed. Continue taking this medication, and follow the directions you see here.      Inject 1 mL (125 mg) under the skin 1 (one) time per week.              CONTINUE taking these medications        Instructions Last Dose Given Next Dose Due   Excedrin Extra Strength 250-250-65 mg tablet  Generic drug: aspirin-acetaminophen-caffeine           metoprolol succinate XL 50 mg 24 hr tablet  Commonly known as: Toprol-XL      Take 1 tablet (50 mg) by mouth 2 times a day.                 Where to Get Your Medications        Information about where to get these medications is not yet available    Ask  your nurse or doctor about these medications  acetaminophen 325 mg tablet  aspirin 81 mg EC tablet  atorvastatin 20 mg tablet  clindamycin 300 mg capsule  ondansetron ODT 4 mg disintegrating tablet  polyethylene glycol 17 gram packet         Test Results Pending At Discharge  Pending Labs       Order Current Status    Sars-CoV-2 PCR, Screen Asymptomatic Collected (01/12/24 1109)            Hospital Course   Maggie Carrington is a 63 y.o. female with a medical history of RA, A.Fib, and HTN who was transferred from [facility] for cardiology and OMFS evaluations. Had a syncopal episode with fall and found to have troponemia and dental abscess. Admitted for possible NSTEMI. Troponin 94>127>170>75. Heparin gtt was initiated at OSH and completed 48 hours of therapy. Continued with medical mangement: ASA, Statin, Brilinita. Echocardiogram: LVEF 60-65%; no WMA; normal pattern of diastolic filling. Cardiology consulted - demand ischemia in setting of abscess, syncope, hypotension. Brilinta stopped. Monitored on telemetry. No ACS symptoms at this time. Placed on Unasyn for dental abscess. Started on 1/6/24. Pain issues resolved. Final BC negative. Afebrile. No leukocytosis. Advised to complete course of antibiotics (Clindamycin prescribed at discharge) and follow up with dentist for further treatment. Intolerance to Augmentin as previously prescribed. Hemodynamically stable for discharge to SNF.     Medical Management:      CV: HTN, A.Fib  -No CHF on Echocardiogram  -On ASA, statin, Metoprolol  -Monitored on telemetry     Rheumatoid Arthritis/OA  -On Orencia injections  -Supportive care  -PT/OT recommended Mod Intensity therapy.  -Ambulates with walker     Fluids/Electrolytes/Nutrition  -Laboratory data reviewed.   -Electrolytes stable.   -No nutritional concerns at this time.       Disposition  Plan of care discussed with attending, Dr. Bernabe. Patient was seen and examined in her room this AM. Doing well. Pain minimal.  She denies chest pain, breathing difficulties, abdominal pain, N/V/D/C, fever, or chills.  Medications reviewed and reconciled. Scripts prepared as needed. Greater than 35 minutes spent in coordination of care, including physical examination, chart review, medication reconciliation, collaboration with providers, staff, and family.     Pertinent Physical Exam At Time of Discharge  Physical Exam  Constitutional: A&O x 3; NAD; calm and cooperative; thin with arthritic changes  Eyes: EOM's intact  HEENT: Normocephalic, Atraumatic. Oral mucosa moist.   Neck: Supple. No JVD, lymphadenopathy.   Lungs: CTAB with fair air movement. Respirations even and unlabored on room air.   Heart: RRR  Abdomen: Soft, non-tender, non-distended, +BS. RLQ ostomy patent.   MS/Extremities: HILLIARD equally x 4. No edema. Peripheral pulses intact bilaterally. Significant joint deformities related to RA.  Neuro: A&O x3; no focal deficits; gross motor and sensation intact.   Skin: Warm and dry. No rashes or lesions. Chin ecchymosis.   Psych: Normal affect.      Outpatient Follow-Up  Future Appointments   Date Time Provider Department Center   2/21/2024  1:15 PM Tesfaye Corona MD [id number] [facility]   6/25/2024 10:40 AM Esperanza Smith MD [id number] [facility]         SHANKAR Desai-CNP

## 2024-01-12 NOTE — NURSING NOTE
Patient pleasant, calm and cooperative with care. Orientedx4. Pt c/o generalized arthritis pain that was controlled with prn tylenol. Colostomy producing liquid/soft stool. Patient was nNSR on tele. Currently sleeping in bed, call light within reach

## 2024-01-12 NOTE — CARE PLAN
The patient's goals for the shift include      The clinical goals for the shift include patient will remain free from injury throughout shift    Over the shift, the patient did not make progress toward the following goals. Barriers to progression include   Problem: Safety - Adult  Goal: Free from fall injury  Outcome: Progressing     Problem: Skin  Goal: Decreased wound size/increased tissue granulation at next dressing change  Outcome: Progressing  Flowsheets (Taken 1/11/2024 7100)  Decreased wound size/increased tissue granulation at next dressing change: Promote sleep for wound healing  Goal: Participates in plan/prevention/treatment measures  Outcome: Progressing  Goal: Prevent/manage excess moisture  Outcome: Progressing  Goal: Prevent/minimize sheer/friction injuries  Outcome: Progressing  Goal: Promote/optimize nutrition  Outcome: Progressing  Goal: Promote skin healing  Outcome: Progressing   . Recommendations to address these barriers include .

## 2024-01-14 ENCOUNTER — NURSING HOME VISIT (OUTPATIENT)
Dept: POST ACUTE CARE | Facility: EXTERNAL LOCATION | Age: 64
End: 2024-01-14
Payer: COMMERCIAL

## 2024-01-14 DIAGNOSIS — I21.9 ACUTE MYOCARDIAL INFARCTION, UNSPECIFIED MI TYPE, UNSPECIFIED ARTERY (MULTI): Primary | ICD-10-CM

## 2024-01-14 DIAGNOSIS — R53.1 WEAKNESS: ICD-10-CM

## 2024-01-14 DIAGNOSIS — Z91.81 AT RISK FOR FALLING: ICD-10-CM

## 2024-01-14 DIAGNOSIS — F41.9 ANXIETY: ICD-10-CM

## 2024-01-14 DIAGNOSIS — M19.90 OSTEOARTHRITIS, UNSPECIFIED OSTEOARTHRITIS TYPE, UNSPECIFIED SITE: ICD-10-CM

## 2024-01-14 PROCEDURE — 99305 1ST NF CARE MODERATE MDM 35: CPT | Performed by: INTERNAL MEDICINE

## 2024-01-14 NOTE — LETTER
Patient: Maggie Carrington  : 1960    Encounter Date: 2024    PLACE OF SERVICE:  [facility] [place]    This is a new/initial history and physical.    Subjective  Patient ID: Maggie Carrington is a 63 y.o. female who presents for initial visit.    Ms. Maggie Carrington is a 63-year-old female with a recent history of AMI.  She suffers from weakness and deconditioning.  She is unable to care for herself.  She requires supportive care.    Review of Systems   Constitutional:  Negative for chills and fever.   Cardiovascular:  Negative for chest pain.   All other systems reviewed and are negative.    Objective  /74   Pulse 78   Temp 36.5 °C (97.7 °F)   Resp 16     Physical Exam  Vitals reviewed.   Constitutional:       Comments: This is a well-developed, well-nourished female, lying in bed, appears awake and lethargic.   HENT:      Right Ear: Tympanic membrane, ear canal and external ear normal.      Left Ear: Tympanic membrane, ear canal and external ear normal.   Eyes:      General: No scleral icterus.     Pupils: Pupils are equal, round, and reactive to light.   Neck:      Vascular: No carotid bruit.   Cardiovascular:      Heart sounds: Normal heart sounds, S1 normal and S2 normal. No murmur heard.     No friction rub.   Pulmonary:      Effort: Pulmonary effort is normal.      Breath sounds: Normal breath sounds and air entry.   Abdominal:      Palpations: There is no hepatomegaly, splenomegaly or mass.   Musculoskeletal:         General: No swelling or deformity. Normal range of motion.      Cervical back: Neck supple.      Right lower leg: No edema.      Left lower leg: No edema.   Lymphadenopathy:      Cervical: No cervical adenopathy.      Upper Body:      Right upper body: No axillary adenopathy.      Left upper body: No axillary adenopathy.      Lower Body: No right inguinal adenopathy. No left inguinal adenopathy.   Neurological:      Mental Status: She is oriented  to person, place, and time.      Cranial Nerves: Cranial nerves 2-12 are intact. No cranial nerve deficit.      Sensory: No sensory deficit.      Motor: Motor function is intact. No weakness.      Gait: Gait is intact.      Deep Tendon Reflexes: Reflexes normal.   Psychiatric:         Mood and Affect: Mood normal. Mood is not anxious or depressed. Affect is not angry.         Behavior: Behavior is not agitated.         Thought Content: Thought content normal.         Judgment: Judgment normal.     LAB WORK:  Laboratory studies reviewed.    Assessment/Plan  Problem List Items Addressed This Visit             ICD-10-CM       Mental Health    Anxiety F41.9     Other Visit Diagnoses         Codes    Acute myocardial infarction, unspecified MI type, unspecified artery (CMS/[facility])    -  Primary I21.9    Weakness     R53.1    At risk for falling     Z91.81    Osteoarthritis, unspecified osteoarthritis type, unspecified site     M19.90        1. Recent AMI, on aspirin.  2. Weakness, on PT/OT.  3. Fall risk, fall precaution.  4. Osteoarthritis, on Tylenol.  5. Anxiety, on medication.    Scribe Attestation  By signing my name below, IRuby, Scribe attest that this documentation has been prepared under the direction and in the presence of Nate Reaves MD.       Electronically Signed By: Nate Reaves MD   1/18/24 12:24 PM

## 2024-01-16 ENCOUNTER — NURSING HOME VISIT (OUTPATIENT)
Dept: POST ACUTE CARE | Facility: EXTERNAL LOCATION | Age: 64
End: 2024-01-16
Payer: COMMERCIAL

## 2024-01-16 VITALS
SYSTOLIC BLOOD PRESSURE: 124 MMHG | RESPIRATION RATE: 16 BRPM | DIASTOLIC BLOOD PRESSURE: 74 MMHG | TEMPERATURE: 97.7 F | HEART RATE: 78 BPM

## 2024-01-16 DIAGNOSIS — R55 SYNCOPE AND COLLAPSE: ICD-10-CM

## 2024-01-16 DIAGNOSIS — Z86.74 HISTORY OF CARDIAC ARREST: ICD-10-CM

## 2024-01-16 DIAGNOSIS — I21.4 NSTEMI (NON-ST ELEVATED MYOCARDIAL INFARCTION) (MULTI): Primary | ICD-10-CM

## 2024-01-16 DIAGNOSIS — R53.81 PHYSICAL DECONDITIONING: ICD-10-CM

## 2024-01-16 DIAGNOSIS — I95.9 HYPOTENSION, UNSPECIFIED HYPOTENSION TYPE: ICD-10-CM

## 2024-01-16 DIAGNOSIS — K04.7 TOOTH INFECTION: ICD-10-CM

## 2024-01-16 DIAGNOSIS — M05.79 RHEUMATOID ARTHRITIS INVOLVING MULTIPLE SITES WITH POSITIVE RHEUMATOID FACTOR (MULTI): ICD-10-CM

## 2024-01-16 PROCEDURE — 99310 SBSQ NF CARE HIGH MDM 45: CPT | Performed by: NURSE PRACTITIONER

## 2024-01-16 ASSESSMENT — ENCOUNTER SYMPTOMS
FEVER: 0
CHILLS: 0

## 2024-01-16 NOTE — PROGRESS NOTES
PLACE OF SERVICE:  [facility] [place]    This is a new/initial history and physical.    Subjective   Patient ID: Maggie Carrington is a 63 y.o. female who presents for initial visit.    Ms. Maggie Carrington is a 63-year-old female with a recent history of AMI.  She suffers from weakness and deconditioning.  She is unable to care for herself.  She requires supportive care.    Review of Systems   Constitutional:  Negative for chills and fever.   Cardiovascular:  Negative for chest pain.   All other systems reviewed and are negative.    Objective   /74   Pulse 78   Temp 36.5 °C (97.7 °F)   Resp 16     Physical Exam  Vitals reviewed.   Constitutional:       Comments: This is a well-developed, well-nourished female, lying in bed, appears awake and lethargic.   HENT:      Right Ear: Tympanic membrane, ear canal and external ear normal.      Left Ear: Tympanic membrane, ear canal and external ear normal.   Eyes:      General: No scleral icterus.     Pupils: Pupils are equal, round, and reactive to light.   Neck:      Vascular: No carotid bruit.   Cardiovascular:      Heart sounds: Normal heart sounds, S1 normal and S2 normal. No murmur heard.     No friction rub.   Pulmonary:      Effort: Pulmonary effort is normal.      Breath sounds: Normal breath sounds and air entry.   Abdominal:      Palpations: There is no hepatomegaly, splenomegaly or mass.   Musculoskeletal:         General: No swelling or deformity. Normal range of motion.      Cervical back: Neck supple.      Right lower leg: No edema.      Left lower leg: No edema.   Lymphadenopathy:      Cervical: No cervical adenopathy.      Upper Body:      Right upper body: No axillary adenopathy.      Left upper body: No axillary adenopathy.      Lower Body: No right inguinal adenopathy. No left inguinal adenopathy.   Neurological:      Mental Status: She is oriented to person, place, and time.      Cranial Nerves: Cranial nerves 2-12  are intact. No cranial nerve deficit.      Sensory: No sensory deficit.      Motor: Motor function is intact. No weakness.      Gait: Gait is intact.      Deep Tendon Reflexes: Reflexes normal.   Psychiatric:         Mood and Affect: Mood normal. Mood is not anxious or depressed. Affect is not angry.         Behavior: Behavior is not agitated.         Thought Content: Thought content normal.         Judgment: Judgment normal.     LAB WORK:  Laboratory studies reviewed.    Assessment/Plan   Problem List Items Addressed This Visit             ICD-10-CM       Mental Health    Anxiety F41.9     Other Visit Diagnoses         Codes    Acute myocardial infarction, unspecified MI type, unspecified artery (CMS/[facility])    -  Primary I21.9    Weakness     R53.1    At risk for falling     Z91.81    Osteoarthritis, unspecified osteoarthritis type, unspecified site     M19.90        1. Recent AMI, on aspirin.  2. Weakness, on PT/OT.  3. Fall risk, fall precaution.  4. Osteoarthritis, on Tylenol.  5. Anxiety, on medication.    Scribe Attestation  By signing my name below, IRuby, Scribofelia attest that this documentation has been prepared under the direction and in the presence of Nate Reaves MD.

## 2024-01-16 NOTE — LETTER
Patient: Maggie Carrington  : 1960    Encounter Date: 2024    Chief Complaint:   SNF F/U  NSTEMI  Syncope  Physical deconditioning/muscle weakness  Tooth infection     HPI:   63 year-old female presenting to the ER on 24 s/p syncopal episode. Pt. reported that she had been treated with amoxicillin since  for a tooth infection. She stated that she had been vomiting every time she took the medication. She was walking to the bathroom when her legs became shaky and she got dizzy. She woke up on the floor and called 911. Work-up in ER: imaging negative for fracture, Trop 94, EKG with T-wave inversion, no ST elevation. Pt. was started on IV ATB and admitted to the hospital for further evaluation and treatment. Hospital course:    NSTEMI/syncope and collapse/hypotension/Hx cardiac arrest-Hx of VT/PE cardiac arrest post-surgery in 2022, ROSC quickly, telemetry monitoring, cardiology consult, heparin drip, metoprolol held due to low BP, c/w ASA, statin, and ticagrelor, orthostatic VS, pt. transferred to [facility] on 1/10 for further care, ECHO showed LVEF 60-65% with normal pattern of diastolic filling, brillinta stopped, elevated troponin thought to be 2/2 demand ischemia in setting of abscess, syncope, and hypotension  Dental abscess-c/w IV Unasyn, ENT consulted, ID consulted, BCx negative, discharged on clindamycin at discharge, F/U with dentist   RA-Orencia injections as OP, followed by rheumatology  Physical deconditioning-PT/OT evaluations, recommending SNF    Pt. was HDS and discharged to [facility] on 24. Today, patient reports resolution of dental pain. She does report generalized weakness, but reports improvement since hospitalization. She denies dizziness, HA, vision change, SOB, cough, or chest pain. Staff report no clinical concerns.     ROS:    As above in HPI. Otherwise, all other systems have been reviewed and are negative for complaint.    Medications reviewed and verified in NH  chart.     Patient Active Problem List   Diagnosis   • Essential hypertension   • Elevated troponin   • Syncope and collapse   • Dental abscess   • Rheumatoid arthritis involving multiple sites with positive rheumatoid factor (CMS/HCC)   • Diverticulosis   • History of cardiac arrest   • Anxiety        Past Medical History:   Diagnosis Date   • AMI (acute myocardial infarction) (CMS/HCC)    • Anxiety    • Arthritis    • At risk for falling    • Atrial fibrillation (CMS/HCC)    • Colostomy care (CMS/[facility])    • Dental abscess    • Encounter for screening for other viral diseases 02/08/2016    Need for hepatitis B screening test   • Hyperlipidemia    • Hypertension    • Muscle weakness (generalized)     Muscle weakness   • Osteoarthritis    • Pain in unspecified joint     Multiple joint pain   • Pneumonia, unspecified organism 11/10/2015    Community acquired pneumonia   • Rheumatoid arthritis (CMS/HCC)    • Syncope    • Weakness        Past Surgical History:   Procedure Laterality Date   • COLECTOMY     • CT GUIDED PERCUTANEOUS PERITONEAL OR RETROPERITONEAL FLUID COLLECTION DRAINAGE  01/31/2022    CT GUIDED PERCUTANEOUS PERITONEAL OR RETROPERITONEAL FLUID COLLECTION DRAINAGE [facility] INPATIENT LEGACY   • CT GUIDED PERCUTANEOUS PERITONEAL OR RETROPERITONEAL FLUID COLLECTION DRAINAGE  08/10/2022    CT GUIDED PERCUTANEOUS PERITONEAL OR RETROPERITONEAL FLUID COLLECTION DRAINAGE [facility] INPATIENT LEGACY       Family History   Problem Relation Name Age of Onset   • Diabetes Mother     • Other (psoriatic arthritis) Father     • Diabetes Father         Social History     Tobacco Use   Smoking Status Never   • Passive exposure: Never   Smokeless Tobacco Never       Social History     Substance and Sexual Activity   Alcohol Use Never       Social History     Substance and Sexual Activity   Drug Use Never       Allergies   Allergen Reactions   • Duloxetine GI Upset        Vital Signs:   88/54-70-18-98.9-98% on RA    Physical  Exam:  General: Sitting up in WC in NAD, alert   Head/Face: NCAT, symmetrical  Eyes: PERRLA, no injection, no discharge  ENT: Hearing not impaired, ears without scars or lesions, nasal mucosa and turbinates pink, septum midline, lips pink and moist  Neck: Supple, symmetrical  Respiratory: CTA without adventitious sounds, even and nonlabored without use of accessory muscles, good air exchange  Cardio: RRR without murmur or gallops, normal S1S2, no edema, pedal pulses 3+/4 bilaterally  Chest/Breast: Symmetrical  GI: BS x 4, normoactive, non-distended, abd round and soft, no masses or tenderness, + ostomy (stoma pink and moist)   : No suprapubic tenderness or distention  MSK: Gait not assessed, joints with full ROM without pain or contractures,+ generalized weakness  Skin: Skin warm and dry, no induration  Neurologic: Cranial nerves II through XII intact, superficial touch and pain sensation intact  Psychiatric: Alert to person, place, and time, calm and cooperative     Results/Data:   1/15/24: Na+ 134, Chol 95, HDL 55, Hgb 10.1, Hct 31.3    Assessment/Plan:  NSTEMI/syncope and collapse/hypotension/Hx cardiac arrest-CHARO diet, c/w ASA and atorvastatin, decrease metoprolol tartrate to 12.5 mg BID (hold for SBP less than 100 or HR less than 60), monitor BP and HR, F/U with cardiologist as scheduled  Dental abscess-s/p clindamycin (end date 1/15), F/U with dentist  Physical deconditioning-c/w PT/OT  RA-c/w Orencia, F/U with rheumatologist as scheduled     Orders:  Decrease metoprolol tartrate to 12.5 mg PO BID (Hold for SBP less than 100 or HR less than 60)    Code Status:   Full Code    Time spent reviewing patient's chart on the unit (PMH, PSH, FH, Social Hx AND progress and/or consult notes, labs, and radiology results): 25 minutes  Time spent interviewing and/or examining the patient: 10 minutes  Time spent writing note on the unit: 5 minutes  Time spent reviewing POC w/ patient, family, and/or staff: 5 minutes    Total visit time: 45 minutes. Greater than 50% of time was spent on counseling and/or coordination of care of the patient. Start time: 11:48 AM , End time: 12:33 PM.          Electronically Signed By: AINSLEY Molina   2/5/24  9:36 PM

## 2024-01-21 ENCOUNTER — NURSING HOME VISIT (OUTPATIENT)
Dept: POST ACUTE CARE | Facility: EXTERNAL LOCATION | Age: 64
End: 2024-01-21
Payer: COMMERCIAL

## 2024-01-21 DIAGNOSIS — I21.9 ACUTE MYOCARDIAL INFARCTION, UNSPECIFIED MI TYPE, UNSPECIFIED ARTERY (MULTI): Primary | ICD-10-CM

## 2024-01-21 DIAGNOSIS — M19.90 OSTEOARTHRITIS, UNSPECIFIED OSTEOARTHRITIS TYPE, UNSPECIFIED SITE: ICD-10-CM

## 2024-01-21 DIAGNOSIS — E78.5 HYPERLIPIDEMIA, UNSPECIFIED HYPERLIPIDEMIA TYPE: ICD-10-CM

## 2024-01-21 DIAGNOSIS — R53.1 WEAKNESS: ICD-10-CM

## 2024-01-21 DIAGNOSIS — Z91.81 AT RISK FOR FALLING: ICD-10-CM

## 2024-01-21 DIAGNOSIS — I10 HYPERTENSION, UNSPECIFIED TYPE: ICD-10-CM

## 2024-01-21 DIAGNOSIS — M06.9 RHEUMATOID ARTHRITIS, INVOLVING UNSPECIFIED SITE, UNSPECIFIED WHETHER RHEUMATOID FACTOR PRESENT (MULTI): ICD-10-CM

## 2024-01-21 DIAGNOSIS — I48.91 ATRIAL FIBRILLATION, UNSPECIFIED TYPE (MULTI): ICD-10-CM

## 2024-01-21 DIAGNOSIS — K04.7 DENTAL ABSCESS: ICD-10-CM

## 2024-01-21 DIAGNOSIS — R55 SYNCOPE, UNSPECIFIED SYNCOPE TYPE: ICD-10-CM

## 2024-01-21 PROCEDURE — 99309 SBSQ NF CARE MODERATE MDM 30: CPT | Performed by: INTERNAL MEDICINE

## 2024-01-21 NOTE — LETTER
Patient: Maggie Carrington  : 1960    Encounter Date: 2024    PLACE OF SERVICE:  [facility] [place].    This is a subsequent visit.    Subjective  Patient ID: Maggie Carrington is a 63 y.o. female who presents for Follow-up.    Ms. Maggie Carrington is a 63-year-old female with a recent history of AMI and dental abscess.  She has weakness and deconditioning.  She is unable to care for herself.  She requires supportive care.    Review of Systems   Constitutional:  Negative for chills and fever.   Cardiovascular:  Negative for chest pain.   All other systems reviewed and are negative.    Objective  /78   Pulse 74   Temp 36.7 °C (98.1 °F)   Resp 16     Physical Exam  Vitals reviewed.   Constitutional:       General: She is not in acute distress.     Comments: This is a well-developed, well-nourished female, sitting in a chair.   HENT:      Right Ear: Tympanic membrane, ear canal and external ear normal.      Left Ear: Tympanic membrane, ear canal and external ear normal.   Eyes:      General: No scleral icterus.     Pupils: Pupils are equal, round, and reactive to light.   Neck:      Vascular: No carotid bruit.   Cardiovascular:      Heart sounds: Normal heart sounds, S1 normal and S2 normal. No murmur heard.     No friction rub.   Pulmonary:      Effort: Pulmonary effort is normal.      Breath sounds: Normal breath sounds and air entry.   Abdominal:      Palpations: There is no hepatomegaly, splenomegaly or mass.   Musculoskeletal:         General: No swelling or deformity. Normal range of motion.      Cervical back: Neck supple.      Right lower leg: No edema.      Left lower leg: No edema.   Lymphadenopathy:      Cervical: No cervical adenopathy.      Upper Body:      Right upper body: No axillary adenopathy.      Left upper body: No axillary adenopathy.      Lower Body: No right inguinal adenopathy. No left inguinal adenopathy.   Neurological:      Mental Status: She  is oriented to person, place, and time.      Cranial Nerves: Cranial nerves 2-12 are intact. No cranial nerve deficit.      Sensory: No sensory deficit.      Motor: Motor function is intact. No weakness.      Gait: Gait is intact.      Deep Tendon Reflexes: Reflexes normal.   Psychiatric:         Mood and Affect: Mood normal. Mood is not anxious or depressed. Affect is not angry.         Behavior: Behavior is not agitated.         Thought Content: Thought content normal.         Judgment: Judgment normal.     LAB WORK:  Laboratory studies were reviewed.    Assessment/Plan  Problem List Items Addressed This Visit             ICD-10-CM       ENT    Dental abscess K04.7     Other Visit Diagnoses         Codes    Acute myocardial infarction, unspecified MI type, unspecified artery (CMS/HCC)    -  Primary I21.9    Syncope, unspecified syncope type     R55    Hypertension, unspecified type     I10    Rheumatoid arthritis, involving unspecified site, unspecified whether rheumatoid factor present (CMS/HCC)     M06.9    Hyperlipidemia, unspecified hyperlipidemia type     E78.5    Atrial fibrillation, unspecified type (CMS/HCC)     I48.91    Osteoarthritis, unspecified osteoarthritis type, unspecified site     M19.90    Weakness     R53.1    At risk for falling     Z91.81        1. Recent AMI.  Follow with Cardiology.  2. Dental abscesses.  Finish antibiotic.  3. Recent syncope.  Continue to monitor.  4. Hypertension, medically controlled.  5. Rheumatoid arthritis, on medication.  6. Hyperlipidemia, on statin.  7. Atrial fibrillation.  Rate controlled.  8. Osteoarthritis, on Tylenol.  9. Weakness, on PT/OT.  10. Fall risk, on fall precautions.    Scribe Attestation  By signing my name below, IWandy Scribe attest that this documentation has been prepared under the direction and in the presence of Nate Reaves MD.   All medical record entries made by the scribe were personally dictated by me I have reviewed the chart  and agree the record accurately reflects my personal performance of his history physical examination and management      Electronically Signed By: Nate Reaves MD   1/30/24  4:04 PM

## 2024-01-23 ENCOUNTER — NURSING HOME VISIT (OUTPATIENT)
Dept: POST ACUTE CARE | Facility: EXTERNAL LOCATION | Age: 64
End: 2024-01-23
Payer: COMMERCIAL

## 2024-01-23 DIAGNOSIS — R53.81 PHYSICAL DECONDITIONING: ICD-10-CM

## 2024-01-23 DIAGNOSIS — R53.1 WEAKNESS: ICD-10-CM

## 2024-01-23 DIAGNOSIS — I95.9 HYPOTENSION, UNSPECIFIED HYPOTENSION TYPE: ICD-10-CM

## 2024-01-23 DIAGNOSIS — R55 SYNCOPE, UNSPECIFIED SYNCOPE TYPE: ICD-10-CM

## 2024-01-23 DIAGNOSIS — I48.91 ATRIAL FIBRILLATION, UNSPECIFIED TYPE (MULTI): ICD-10-CM

## 2024-01-23 DIAGNOSIS — I21.4 NSTEMI (NON-ST ELEVATED MYOCARDIAL INFARCTION) (MULTI): ICD-10-CM

## 2024-01-23 DIAGNOSIS — I10 HYPERTENSION, UNSPECIFIED TYPE: Primary | ICD-10-CM

## 2024-01-23 DIAGNOSIS — K04.7 TOOTH INFECTION: ICD-10-CM

## 2024-01-23 PROCEDURE — 99309 SBSQ NF CARE MODERATE MDM 30: CPT | Performed by: NURSE PRACTITIONER

## 2024-01-23 NOTE — LETTER
Patient: Maggie Carrington  : 1960    Encounter Date: 2024    Chief Complaint:   SNF F/U  NSTEMI  Syncope  Physical deconditioning/muscle weakness  Tooth infection     HPI:   63 year-old female presenting to the ER on 24 s/p syncopal episode. Pt. reported that she had been treated with amoxicillin since  for a tooth infection. She stated that she had been vomiting every time she took the medication. She was walking to the bathroom when her legs became shaky and she got dizzy. She woke up on the floor and called 911. Work-up in ER: imaging negative for fracture, Trop 94, EKG with T-wave inversion, no ST elevation. Pt. was started on IV ATB and admitted to the hospital for further evaluation and treatment. Hospital course:    NSTEMI/syncope and collapse/hypotension/Hx cardiac arrest-Hx of VT/PE cardiac arrest post-surgery in 2022, ROSC quickly, telemetry monitoring, cardiology consult, heparin drip, metoprolol held due to low BP, c/w ASA, statin, and ticagrelor, orthostatic VS, pt. transferred to [facility] on 1/10 for further care, ECHO showed LVEF 60-65% with normal pattern of diastolic filling, brillinta stopped, elevated troponin thought to be 2/2 demand ischemia in setting of abscess, syncope, and hypotension  Dental abscess-c/w IV Unasyn, ENT consulted, ID consulted, BCx negative, discharged on clindamycin at discharge, F/U with dentist   RA-Orencia injections as OP, followed by rheumatology  Physical deconditioning-PT/OT evaluations, recommending SNF    Pt. was HDS and discharged to [facility] on 24. Today, patient reports resolution of dental pain. She denies dizziness, HA, vision change, SOB, cough, or chest pain. She reports improvement in strength and is planning on discharging home at the end of the week. Staff report no clinical concerns.     ROS:    As above in HPI. Otherwise, all other systems have been reviewed and are negative for complaint.    Medications reviewed and  verified in NH chart.     Patient Active Problem List   Diagnosis   • Essential hypertension   • Elevated troponin   • Syncope and collapse   • Dental abscess   • Rheumatoid arthritis involving multiple sites with positive rheumatoid factor (CMS/HCC)   • Diverticulosis   • History of cardiac arrest   • Anxiety        Past Medical History:   Diagnosis Date   • AMI (acute myocardial infarction) (CMS/[facility])    • Anxiety    • Arthritis    • At risk for falling    • Atrial fibrillation (CMS/HCC)    • Colostomy care (CMS/[facility])    • Dental abscess    • Encounter for screening for other viral diseases 02/08/2016    Need for hepatitis B screening test   • Hyperlipidemia    • Hypertension    • Muscle weakness (generalized)     Muscle weakness   • Osteoarthritis    • Pain in unspecified joint     Multiple joint pain   • Pneumonia, unspecified organism 11/10/2015    Community acquired pneumonia   • Rheumatoid arthritis (CMS/[facility])    • Syncope    • Weakness        Past Surgical History:   Procedure Laterality Date   • COLECTOMY     • CT GUIDED PERCUTANEOUS PERITONEAL OR RETROPERITONEAL FLUID COLLECTION DRAINAGE  01/31/2022    CT GUIDED PERCUTANEOUS PERITONEAL OR RETROPERITONEAL FLUID COLLECTION DRAINAGE [facility] INPATIENT LEGAC[facility]   • CT GUIDED PERCUTANEOUS PERITONEAL OR RETROPERITONEAL FLUID COLLECTION DRAINAGE  08/10/2022    CT GUIDED PERCUTANEOUS PERITONEAL OR RETROPERITONEAL FLUID COLLECTION DRAINAGE [facility] INPATIENT LEGACY       Family History   Problem Relation Name Age of Onset   • Diabetes Mother     • Other (psoriatic arthritis) Father     • Diabetes Father         Social History     Tobacco Use   Smoking Status Never   • Passive exposure: Never   Smokeless Tobacco Never       Social History     Substance and Sexual Activity   Alcohol Use Never       Social History     Substance and Sexual Activity   Drug Use Never       Allergies   Allergen Reactions   • Duloxetine GI Upset        Vital Signs:   109/69-77-18-98.9-98% on  RA    Physical Exam:  General: Sitting up in WC in NAD, alert   Head/Face: NCAT, symmetrical  Eyes: PERRLA, no injection, no discharge  ENT: Hearing not impaired, ears without scars or lesions, nasal mucosa and turbinates pink, septum midline, lips pink and moist  Neck: Supple, symmetrical  Respiratory: CTA without adventitious sounds, even and nonlabored without use of accessory muscles, good air exchange  Cardio: RRR without murmur or gallops, normal S1S2, no edema, pedal pulses 3+/4 bilaterally  Chest/Breast: Symmetrical  GI: BS x 4, normoactive, non-distended, abd round and soft, no masses or tenderness, + ostomy (stoma pink and moist)   : No suprapubic tenderness or distention  MSK: Gait not assessed, joints with full ROM without pain or contractures,+ generalized weakness  Skin: Skin warm and dry, no induration  Neurologic: Cranial nerves II through XII intact, superficial touch and pain sensation intact  Psychiatric: Alert to person, place, and time, calm and cooperative     Results/Data:   1/22/24: BMP WNL, Hgb 10.2, Hct 33.3  1/15/24: Na+ 134, Chol 95, HDL 55, Hgb 10.1, Hct 31.3    Assessment/Plan:  NSTEMI/syncope and collapse/hypotension/Hx cardiac arrest-CHARO diet, c/w ASA, atorvastatin, and metoprolol (hold for SBP less than 100 or HR less than 60), monitor BP and HR, F/U with cardiologist as scheduled  Dental abscess-s/p clindamycin (end date 1/15), F/U with dentist after discharge  Physical deconditioning-c/w PT/OT, safety and fall precautions   RA-c/w Orencia, F/U with rheumatologist as scheduled     Orders:  NNO    Code Status:   Full Code    Electronically Signed By: SHANKAR Molina-CNP   2/15/24  8:52 AM

## 2024-01-26 ENCOUNTER — HOME HEALTH ADMISSION (OUTPATIENT)
Dept: HOME HEALTH SERVICES | Facility: HOME HEALTH | Age: 64
End: 2024-01-26
Payer: COMMERCIAL

## 2024-01-26 ENCOUNTER — DOCUMENTATION (OUTPATIENT)
Dept: HOME HEALTH SERVICES | Facility: HOME HEALTH | Age: 64
End: 2024-01-26
Payer: COMMERCIAL

## 2024-01-26 NOTE — HH CARE COORDINATION
Home Care received a Referral for Nursing, Physical Therapy, and Occupational Therapy. We have processed the referral for a Start of Care on 1/27-1/28.     If you have any questions or concerns, please feel free to contact us at [phone]. Follow the prompts, enter your five digit zip code, and you will be directed to your care team on E[facility] 2.

## 2024-01-27 VITALS
SYSTOLIC BLOOD PRESSURE: 126 MMHG | HEART RATE: 74 BPM | TEMPERATURE: 98.1 F | RESPIRATION RATE: 16 BRPM | DIASTOLIC BLOOD PRESSURE: 78 MMHG

## 2024-01-27 ASSESSMENT — ENCOUNTER SYMPTOMS
CHILLS: 0
FEVER: 0

## 2024-01-27 NOTE — PROGRESS NOTES
PLACE OF SERVICE:  [facility] [place].    This is a subsequent visit.    Subjective   Patient ID: Maggie Carrington is a 63 y.o. female who presents for Follow-up.    Ms. Maggie Carrington is a 63-year-old female with a recent history of AMI and dental abscess.  She has weakness and deconditioning.  She is unable to care for herself.  She requires supportive care.    Review of Systems   Constitutional:  Negative for chills and fever.   Cardiovascular:  Negative for chest pain.   All other systems reviewed and are negative.    Objective   /78   Pulse 74   Temp 36.7 °C (98.1 °F)   Resp 16     Physical Exam  Vitals reviewed.   Constitutional:       General: She is not in acute distress.     Comments: This is a well-developed, well-nourished female, sitting in a chair.   HENT:      Right Ear: Tympanic membrane, ear canal and external ear normal.      Left Ear: Tympanic membrane, ear canal and external ear normal.   Eyes:      General: No scleral icterus.     Pupils: Pupils are equal, round, and reactive to light.   Neck:      Vascular: No carotid bruit.   Cardiovascular:      Heart sounds: Normal heart sounds, S1 normal and S2 normal. No murmur heard.     No friction rub.   Pulmonary:      Effort: Pulmonary effort is normal.      Breath sounds: Normal breath sounds and air entry.   Abdominal:      Palpations: There is no hepatomegaly, splenomegaly or mass.   Musculoskeletal:         General: No swelling or deformity. Normal range of motion.      Cervical back: Neck supple.      Right lower leg: No edema.      Left lower leg: No edema.   Lymphadenopathy:      Cervical: No cervical adenopathy.      Upper Body:      Right upper body: No axillary adenopathy.      Left upper body: No axillary adenopathy.      Lower Body: No right inguinal adenopathy. No left inguinal adenopathy.   Neurological:      Mental Status: She is oriented to person, place, and time.      Cranial Nerves: Cranial  nerves 2-12 are intact. No cranial nerve deficit.      Sensory: No sensory deficit.      Motor: Motor function is intact. No weakness.      Gait: Gait is intact.      Deep Tendon Reflexes: Reflexes normal.   Psychiatric:         Mood and Affect: Mood normal. Mood is not anxious or depressed. Affect is not angry.         Behavior: Behavior is not agitated.         Thought Content: Thought content normal.         Judgment: Judgment normal.     LAB WORK:  Laboratory studies were reviewed.    Assessment/Plan   Problem List Items Addressed This Visit             ICD-10-CM       ENT    Dental abscess K04.7     Other Visit Diagnoses         Codes    Acute myocardial infarction, unspecified MI type, unspecified artery (CMS/HCC)    -  Primary I21.9    Syncope, unspecified syncope type     R55    Hypertension, unspecified type     I10    Rheumatoid arthritis, involving unspecified site, unspecified whether rheumatoid factor present (CMS/HCC)     M06.9    Hyperlipidemia, unspecified hyperlipidemia type     E78.5    Atrial fibrillation, unspecified type (CMS/HCC)     I48.91    Osteoarthritis, unspecified osteoarthritis type, unspecified site     M19.90    Weakness     R53.1    At risk for falling     Z91.81        1. Recent AMI.  Follow with Cardiology.  2. Dental abscesses.  Finish antibiotic.  3. Recent syncope.  Continue to monitor.  4. Hypertension, medically controlled.  5. Rheumatoid arthritis, on medication.  6. Hyperlipidemia, on statin.  7. Atrial fibrillation.  Rate controlled.  8. Osteoarthritis, on Tylenol.  9. Weakness, on PT/OT.  10. Fall risk, on fall precautions.    Scribe Attestation  By signing my name below, IWandy Scribe attest that this documentation has been prepared under the direction and in the presence of Nate Reaves MD.   All medical record entries made by the scribe were personally dictated by me I have reviewed the chart and agree the record accurately reflects my personal performance of  his history physical examination and management

## 2024-01-28 ENCOUNTER — HOME CARE VISIT (OUTPATIENT)
Dept: HOME HEALTH SERVICES | Facility: HOME HEALTH | Age: 64
End: 2024-01-28
Payer: COMMERCIAL

## 2024-01-28 PROCEDURE — 0023 HH SOC

## 2024-01-28 PROCEDURE — G0299 HHS/HOSPICE OF RN EA 15 MIN: HCPCS

## 2024-01-29 VITALS
TEMPERATURE: 96.9 F | RESPIRATION RATE: 18 BRPM | WEIGHT: 98 LBS | SYSTOLIC BLOOD PRESSURE: 104 MMHG | OXYGEN SATURATION: 96 % | BODY MASS INDEX: 16.33 KG/M2 | HEART RATE: 68 BPM | DIASTOLIC BLOOD PRESSURE: 62 MMHG | HEIGHT: 65 IN

## 2024-01-29 ASSESSMENT — ENCOUNTER SYMPTOMS
HIGHEST PAIN SEVERITY IN PAST 24 HOURS: 7/10
CHANGE IN APPETITE: UNCHANGED
PAIN LOCATION - EXACERBATING FACTORS: WALKING
PAIN LOCATION: RIGHT HIP
PAIN: 1
STOOL FREQUENCY: DAILY
FATIGUES EASILY: 1
PAIN LOCATION - PAIN FREQUENCY: CONSTANT
APPETITE LEVEL: GOOD
PAIN SEVERITY GOAL: 0/10
PERSON REPORTING PAIN: PATIENT
LAST BOWEL MOVEMENT: 66867
LOWEST PAIN SEVERITY IN PAST 24 HOURS: 2/10
SUBJECTIVE PAIN PROGRESSION: UNCHANGED
PAIN LOCATION - PAIN SEVERITY: 7/10

## 2024-01-29 ASSESSMENT — ACTIVITIES OF DAILY LIVING (ADL)
ENTERING_EXITING_HOME: STAND BY ASSIST
AMBULATION ASSISTANCE: STAND BY ASSIST
AMBULATION ASSISTANCE: 1
OASIS_M1830: 05

## 2024-01-29 NOTE — CASE COMMUNICATION
patient declined need for further sn visits . no further sn visits planned  63 yr old whtie female nstemi pmh rheumatoid arthritis  patient alert and oriented afebrile vs stable lungs clear pulse ox 95 or better on room air dyspnea with a lot of exertion tolerating reg diet encouraged heart healthy chronic pain managed with current meds   ambulates with walker   reviewed all meds schedule and purpose with potential side effect encoruage d slow position changes  will obtain bp moniter needs to hold metoprolol for systolic less then 100  she also undertands she needs to get a pcp she declined need for a msw    recently released from rehab  after severe fractures following a 12 foot fall while working

## 2024-01-30 ENCOUNTER — HOME CARE VISIT (OUTPATIENT)
Dept: HOME HEALTH SERVICES | Facility: HOME HEALTH | Age: 64
End: 2024-01-30
Payer: COMMERCIAL

## 2024-01-30 VITALS
OXYGEN SATURATION: 98 % | SYSTOLIC BLOOD PRESSURE: 90 MMHG | HEART RATE: 77 BPM | DIASTOLIC BLOOD PRESSURE: 58 MMHG | TEMPERATURE: 97.6 F

## 2024-01-30 PROCEDURE — G0152 HHCP-SERV OF OT,EA 15 MIN: HCPCS

## 2024-01-30 ASSESSMENT — ENCOUNTER SYMPTOMS
SUBJECTIVE PAIN PROGRESSION: UNCHANGED
HIGHEST PAIN SEVERITY IN PAST 24 HOURS: 8/10
DOUBLE VISION: 1
PAIN LOCATION - PAIN QUALITY: ACHING
LOWEST PAIN SEVERITY IN PAST 24 HOURS: 2/10
PAIN LOCATION - RELIEVING FACTORS: REST/POSITIONING
PAIN SEVERITY GOAL: 0/10
PAIN: 1
PAIN LOCATION: RIGHT HIP
PERSON REPORTING PAIN: PATIENT
PAIN LOCATION - PAIN SEVERITY: 2/10

## 2024-01-31 ENCOUNTER — HOME CARE VISIT (OUTPATIENT)
Dept: HOME HEALTH SERVICES | Facility: HOME HEALTH | Age: 64
End: 2024-01-31
Payer: COMMERCIAL

## 2024-01-31 VITALS
RESPIRATION RATE: 18 BRPM | OXYGEN SATURATION: 98 % | DIASTOLIC BLOOD PRESSURE: 62 MMHG | HEART RATE: 71 BPM | SYSTOLIC BLOOD PRESSURE: 105 MMHG | TEMPERATURE: 98.2 F

## 2024-01-31 PROCEDURE — G0151 HHCP-SERV OF PT,EA 15 MIN: HCPCS

## 2024-01-31 ASSESSMENT — ENCOUNTER SYMPTOMS
PAIN: 1
PERSON REPORTING PAIN: PATIENT
HIGHEST PAIN SEVERITY IN PAST 24 HOURS: 8/10
PAIN LOCATION: RIGHT HIP
LOWEST PAIN SEVERITY IN PAST 24 HOURS: 2/10
PAIN LOCATION: RIGHT KNEE
SUBJECTIVE PAIN PROGRESSION: UNCHANGED
PAIN SEVERITY GOAL: 0/10
OCCASIONAL FEELINGS OF UNSTEADINESS: 1

## 2024-02-02 ENCOUNTER — HOME CARE VISIT (OUTPATIENT)
Dept: HOME HEALTH SERVICES | Facility: HOME HEALTH | Age: 64
End: 2024-02-02
Payer: COMMERCIAL

## 2024-02-02 VITALS — SYSTOLIC BLOOD PRESSURE: 100 MMHG | RESPIRATION RATE: 18 BRPM | DIASTOLIC BLOOD PRESSURE: 68 MMHG

## 2024-02-02 PROCEDURE — G0157 HHC PT ASSISTANT EA 15: HCPCS

## 2024-02-02 ASSESSMENT — PAIN SCALES - PAIN ASSESSMENT IN ADVANCED DEMENTIA (PAINAD)
TOTALSCORE: 0
NEGVOCALIZATION: 0
BODYLANGUAGE: 0 - RELAXED.
FACIALEXPRESSION: 0
BODYLANGUAGE: 0
CONSOLABILITY: 0
BREATHING: 0
FACIALEXPRESSION: 0 - SMILING OR INEXPRESSIVE.
CONSOLABILITY: 0 - NO NEED TO CONSOLE.
NEGVOCALIZATION: 0 - NONE.

## 2024-02-02 ASSESSMENT — ENCOUNTER SYMPTOMS
PAIN LOCATION - PAIN SEVERITY: 1/10
PAIN: 1
PAIN LOCATION: RIGHT HIP
PERSON REPORTING PAIN: PATIENT

## 2024-02-03 LAB
ATRIAL RATE: 69 BPM
P AXIS: 54 DEGREES
P OFFSET: 204 MS
P ONSET: 163 MS
PR INTERVAL: 118 MS
Q ONSET: 222 MS
QRS COUNT: 11 BEATS
QRS DURATION: 70 MS
QT INTERVAL: 392 MS
QTC CALCULATION(BAZETT): 420 MS
QTC FREDERICIA: 410 MS
R AXIS: 77 DEGREES
T AXIS: 57 DEGREES
T OFFSET: 418 MS
VENTRICULAR RATE: 69 BPM

## 2024-02-04 ASSESSMENT — ENCOUNTER SYMPTOMS
LOWEST PAIN SEVERITY IN PAST 24 HOURS: 1/10
PAIN SEVERITY GOAL: 0/10
HIGHEST PAIN SEVERITY IN PAST 24 HOURS: 10/10

## 2024-02-06 ENCOUNTER — HOME CARE VISIT (OUTPATIENT)
Dept: HOME HEALTH SERVICES | Facility: HOME HEALTH | Age: 64
End: 2024-02-06
Payer: COMMERCIAL

## 2024-02-06 VITALS
SYSTOLIC BLOOD PRESSURE: 99 MMHG | HEART RATE: 72 BPM | OXYGEN SATURATION: 98 % | TEMPERATURE: 98.2 F | DIASTOLIC BLOOD PRESSURE: 53 MMHG

## 2024-02-06 PROCEDURE — G0157 HHC PT ASSISTANT EA 15: HCPCS

## 2024-02-06 PROCEDURE — G0152 HHCP-SERV OF OT,EA 15 MIN: HCPCS

## 2024-02-06 ASSESSMENT — ENCOUNTER SYMPTOMS
LOWEST PAIN SEVERITY IN PAST 24 HOURS: 2/10
PAIN: 1
PERSON REPORTING PAIN: PATIENT
PAIN LOCATION - PAIN SEVERITY: 6/10
PAIN SEVERITY GOAL: 1/10
PAIN LOCATION - PAIN QUALITY: ACHY
HIGHEST PAIN SEVERITY IN PAST 24 HOURS: 6/10
PAIN LOCATION - EXACERBATING FACTORS: RA
SUBJECTIVE PAIN PROGRESSION: UNCHANGED
PAIN LOCATION: GENERALIZED
PAIN LOCATION - RELIEVING FACTORS: REST, PAIN MEDS

## 2024-02-06 NOTE — PROGRESS NOTES
Chief Complaint:   SNF F/U  NSTEMI  Syncope  Physical deconditioning/muscle weakness  Tooth infection     HPI:   63 year-old female presenting to the ER on 1/5/24 s/p syncopal episode. Pt. reported that she had been treated with amoxicillin since 1/2 for a tooth infection. She stated that she had been vomiting every time she took the medication. She was walking to the bathroom when her legs became shaky and she got dizzy. She woke up on the floor and called 911. Work-up in ER: imaging negative for fracture, Trop 94, EKG with T-wave inversion, no ST elevation. Pt. was started on IV ATB and admitted to the hospital for further evaluation and treatment. Hospital course:    NSTEMI/syncope and collapse/hypotension/Hx cardiac arrest-Hx of VT/PE cardiac arrest post-surgery in 8/2022, ROSC quickly, telemetry monitoring, cardiology consult, heparin drip, metoprolol held due to low BP, c/w ASA, statin, and ticagrelor, orthostatic VS, pt. transferred to [facility] on 1/10 for further care, ECHO showed LVEF 60-65% with normal pattern of diastolic filling, brillinta stopped, elevated troponin thought to be 2/2 demand ischemia in setting of abscess, syncope, and hypotension  Dental abscess-c/w IV Unasyn, ENT consulted, ID consulted, BCx negative, discharged on clindamycin at discharge, F/U with dentist   RA-Orencia injections as OP, followed by rheumatology  Physical deconditioning-PT/OT evaluations, recommending SNF    Pt. was HDS and discharged to [facility] on 1/12/24. Today, patient reports resolution of dental pain. She does report generalized weakness, but reports improvement since hospitalization. She denies dizziness, HA, vision change, SOB, cough, or chest pain. Staff report no clinical concerns.     ROS:    As above in HPI. Otherwise, all other systems have been reviewed and are negative for complaint.    Medications reviewed and verified in NH chart.     Patient Active Problem List   Diagnosis    Essential  hypertension    Elevated troponin    Syncope and collapse    Dental abscess    Rheumatoid arthritis involving multiple sites with positive rheumatoid factor (CMS/HCC)    Diverticulosis    History of cardiac arrest    Anxiety        Past Medical History:   Diagnosis Date    AMI (acute myocardial infarction) (CMS/HCC)     Anxiety     Arthritis     At risk for falling     Atrial fibrillation (CMS/HCC)     Colostomy care (CMS/[facility])     Dental abscess     Encounter for screening for other viral diseases 02/08/2016    Need for hepatitis B screening test    Hyperlipidemia     Hypertension     Muscle weakness (generalized)     Muscle weakness    Osteoarthritis     Pain in unspecified joint     Multiple joint pain    Pneumonia, unspecified organism 11/10/2015    Community acquired pneumonia    Rheumatoid arthritis (CMS/[facility])     Syncope     Weakness        Past Surgical History:   Procedure Laterality Date    COLECTOMY      CT GUIDED PERCUTANEOUS PERITONEAL OR RETROPERITONEAL FLUID COLLECTION DRAINAGE  01/31/2022    CT GUIDED PERCUTANEOUS PERITONEAL OR RETROPERITONEAL FLUID COLLECTION [facility]    CT GUIDED PERCUTANEOUS PERITONEAL OR RETROPERITONEAL FLUID COLLECTION DRAINAGE  08/10/2022    CT GUIDED PERCUTANEOUS PERITONEAL OR RETROPERITONEAL FLUID COLLECTION DRAINAGE [facility] INPATIENT LEGACY       Family History   Problem Relation Name Age of Onset    Diabetes Mother      Other (psoriatic arthritis) Father      Diabetes Father         Social History     Tobacco Use   Smoking Status Never    Passive exposure: Never   Smokeless Tobacco Never       Social History     Substance and Sexual Activity   Alcohol Use Never       Social History     Substance and Sexual Activity   Drug Use Never       Allergies   Allergen Reactions    Duloxetine GI Upset        Vital Signs:   88/54-70-18-98.9-98% on RA    Physical Exam:  General: Sitting up in WC in NAD, alert   Head/Face: NCAT, symmetrical  Eyes: PERRLA, no injection, no  discharge  ENT: Hearing not impaired, ears without scars or lesions, nasal mucosa and turbinates pink, septum midline, lips pink and moist  Neck: Supple, symmetrical  Respiratory: CTA without adventitious sounds, even and nonlabored without use of accessory muscles, good air exchange  Cardio: RRR without murmur or gallops, normal S1S2, no edema, pedal pulses 3+/4 bilaterally  Chest/Breast: Symmetrical  GI: BS x 4, normoactive, non-distended, abd round and soft, no masses or tenderness, + ostomy (stoma pink and moist)   : No suprapubic tenderness or distention  MSK: Gait not assessed, joints with full ROM without pain or contractures,+ generalized weakness  Skin: Skin warm and dry, no induration  Neurologic: Cranial nerves II through XII intact, superficial touch and pain sensation intact  Psychiatric: Alert to person, place, and time, calm and cooperative     Results/Data:   1/15/24: Na+ 134, Chol 95, HDL 55, Hgb 10.1, Hct 31.3    Assessment/Plan:  NSTEMI/syncope and collapse/hypotension/Hx cardiac arrest-CHARO diet, c/w ASA and atorvastatin, decrease metoprolol tartrate to 12.5 mg BID (hold for SBP less than 100 or HR less than 60), monitor BP and HR, F/U with cardiologist as scheduled  Dental abscess-s/p clindamycin (end date 1/15), F/U with dentist  Physical deconditioning-c/w PT/OT  RA-c/w Orencia, F/U with rheumatologist as scheduled     Orders:  Decrease metoprolol tartrate to 12.5 mg PO BID (Hold for SBP less than 100 or HR less than 60)    Code Status:   Full Code    Time spent reviewing patient's chart on the unit (PMH, PSH, FH, Social Hx AND progress and/or consult notes, labs, and radiology results): 25 minutes  Time spent interviewing and/or examining the patient: 10 minutes  Time spent writing note on the unit: 5 minutes  Time spent reviewing POC w/ patient, family, and/or staff: 5 minutes   Total visit time: 45 minutes. Greater than 50% of time was spent on counseling and/or coordination of care of the  patient. Start time: 11:48 AM , End time: 12:33 PM.

## 2024-02-07 ENCOUNTER — APPOINTMENT (OUTPATIENT)
Dept: HOME HEALTH SERVICES | Facility: HOME HEALTH | Age: 64
End: 2024-02-07
Payer: COMMERCIAL

## 2024-02-07 ASSESSMENT — PAIN SCALES - PAIN ASSESSMENT IN ADVANCED DEMENTIA (PAINAD)
TOTALSCORE: 2
BODYLANGUAGE: 1 - TENSE. DISTRESSED PACING. FIDGETING.
NEGVOCALIZATION: 0
BODYLANGUAGE: 1
NEGVOCALIZATION: 0 - NONE.
FACIALEXPRESSION: 1
BREATHING: 0
CONSOLABILITY: 0
FACIALEXPRESSION: 1 - SAD. FRIGHTENED. FROWN.
CONSOLABILITY: 0 - NO NEED TO CONSOLE.

## 2024-02-07 ASSESSMENT — ENCOUNTER SYMPTOMS
PAIN LOCATION: RIGHT KNEE
PAIN LOCATION - PAIN SEVERITY: 6/10
PAIN LOCATION: RIGHT HIP
PAIN: 1
PAIN LOCATION - PAIN SEVERITY: 6/10
PERSON REPORTING PAIN: PATIENT

## 2024-02-08 ENCOUNTER — HOME CARE VISIT (OUTPATIENT)
Dept: HOME HEALTH SERVICES | Facility: HOME HEALTH | Age: 64
End: 2024-02-08
Payer: COMMERCIAL

## 2024-02-08 PROCEDURE — G0180 MD CERTIFICATION HHA PATIENT: HCPCS | Performed by: INTERNAL MEDICINE

## 2024-02-08 PROCEDURE — G0157 HHC PT ASSISTANT EA 15: HCPCS

## 2024-02-09 ASSESSMENT — PAIN SCALES - PAIN ASSESSMENT IN ADVANCED DEMENTIA (PAINAD)
BREATHING: 0
FACIALEXPRESSION: 0 - SMILING OR INEXPRESSIVE.
BODYLANGUAGE: 0 - RELAXED.
NEGVOCALIZATION: 0
TOTALSCORE: 0
CONSOLABILITY: 0
CONSOLABILITY: 0 - NO NEED TO CONSOLE.
NEGVOCALIZATION: 0 - NONE.
FACIALEXPRESSION: 0
BODYLANGUAGE: 0

## 2024-02-13 ENCOUNTER — HOME CARE VISIT (OUTPATIENT)
Dept: HOME HEALTH SERVICES | Facility: HOME HEALTH | Age: 64
End: 2024-02-13
Payer: COMMERCIAL

## 2024-02-13 PROCEDURE — G0157 HHC PT ASSISTANT EA 15: HCPCS

## 2024-02-14 ASSESSMENT — PAIN SCALES - PAIN ASSESSMENT IN ADVANCED DEMENTIA (PAINAD)
TOTALSCORE: 0
BREATHING: 0
CONSOLABILITY: 0
BODYLANGUAGE: 0 - RELAXED.
NEGVOCALIZATION: 0
NEGVOCALIZATION: 0 - NONE.
CONSOLABILITY: 0 - NO NEED TO CONSOLE.
FACIALEXPRESSION: 0 - SMILING OR INEXPRESSIVE.
FACIALEXPRESSION: 0
BODYLANGUAGE: 0

## 2024-02-14 ASSESSMENT — ENCOUNTER SYMPTOMS
PERSON REPORTING PAIN: PATIENT
PAIN LOCATION: RIGHT HIP
PAIN: 1
PAIN LOCATION - PAIN SEVERITY: 5/10

## 2024-02-15 NOTE — PROGRESS NOTES
Chief Complaint:   SNF F/U  NSTEMI  Syncope  Physical deconditioning/muscle weakness  Tooth infection     HPI:   63 year-old female presenting to the ER on 1/5/24 s/p syncopal episode. Pt. reported that she had been treated with amoxicillin since 1/2 for a tooth infection. She stated that she had been vomiting every time she took the medication. She was walking to the bathroom when her legs became shaky and she got dizzy. She woke up on the floor and called 911. Work-up in ER: imaging negative for fracture, Trop 94, EKG with T-wave inversion, no ST elevation. Pt. was started on IV ATB and admitted to the hospital for further evaluation and treatment. Hospital course:    NSTEMI/syncope and collapse/hypotension/Hx cardiac arrest-Hx of VT/PE cardiac arrest post-surgery in 8/2022, ROSC quickly, telemetry monitoring, cardiology consult, heparin drip, metoprolol held due to low BP, c/w ASA, statin, and ticagrelor, orthostatic VS, pt. transferred to [facility] on 1/10 for further care, ECHO showed LVEF 60-65% with normal pattern of diastolic filling, brillinta stopped, elevated troponin thought to be 2/2 demand ischemia in setting of abscess, syncope, and hypotension  Dental abscess-c/w IV Unasyn, ENT consulted, ID consulted, BCx negative, discharged on clindamycin at discharge, F/U with dentist   RA-Orencia injections as OP, followed by rheumatology  Physical deconditioning-PT/OT evaluations, recommending SNF    Pt. was HDS and discharged to [facility] on 1/12/24. Today, patient reports resolution of dental pain. She denies dizziness, HA, vision change, SOB, cough, or chest pain. She reports improvement in strength and is planning on discharging home at the end of the week. Staff report no clinical concerns.     ROS:    As above in HPI. Otherwise, all other systems have been reviewed and are negative for complaint.    Medications reviewed and verified in NH chart.     Patient Active Problem List   Diagnosis     Essential hypertension    Elevated troponin    Syncope and collapse    Dental abscess    Rheumatoid arthritis involving multiple sites with positive rheumatoid factor (CMS/HCC)    Diverticulosis    History of cardiac arrest    Anxiety        Past Medical History:   Diagnosis Date    AMI (acute myocardial infarction) (CMS/HCC)     Anxiety     Arthritis     At risk for falling     Atrial fibrillation (CMS/HCC)     Colostomy care (CMS/[facility])     Dental abscess     Encounter for screening for other viral diseases 02/08/2016    Need for hepatitis B screening test    Hyperlipidemia     Hypertension     Muscle weakness (generalized)     Muscle weakness    Osteoarthritis     Pain in unspecified joint     Multiple joint pain    Pneumonia, unspecified organism 11/10/2015    Community acquired pneumonia    Rheumatoid arthritis (CMS/[facility])     Syncope     Weakness        Past Surgical History:   Procedure Laterality Date    COLECTOMY      CT GUIDED PERCUTANEOUS PERITONEAL OR RETROPERITONEAL FLUID COLLECTION DRAINAGE  01/31/2022    CT GUIDED PERCUTANEOUS PERITONEAL OR RETROPERITONEAL FLUID [facility]CTION [facility]    [facility] GUIDED PERCUTANEOUS PERITONEAL OR RETROPERITONEAL FLUID COLLECTION DRAINAGE  08/10/2022    CT GUIDED PERCUTANEOUS PERITONEAL OR RETROPERITONEAL FLUID COLLECTION DRAINAGE [facility] INPATIENT LEGACY       Family History   Problem Relation Name Age of Onset    Diabetes Mother      Other (psoriatic arthritis) Father      Diabetes Father         Social History     Tobacco Use   Smoking Status Never    Passive exposure: Never   Smokeless Tobacco Never       Social History     Substance and Sexual Activity   Alcohol Use Never       Social History     Substance and Sexual Activity   Drug Use Never       Allergies   Allergen Reactions    Duloxetine GI Upset        Vital Signs:   109/69-77-18-98.9-98% on RA    Physical Exam:  General: Sitting up in WC in NAD, alert   Head/Face: NCAT, symmetrical  Eyes: PERRLA, no  injection, no discharge  ENT: Hearing not impaired, ears without scars or lesions, nasal mucosa and turbinates pink, septum midline, lips pink and moist  Neck: Supple, symmetrical  Respiratory: CTA without adventitious sounds, even and nonlabored without use of accessory muscles, good air exchange  Cardio: RRR without murmur or gallops, normal S1S2, no edema, pedal pulses 3+/4 bilaterally  Chest/Breast: Symmetrical  GI: BS x 4, normoactive, non-distended, abd round and soft, no masses or tenderness, + ostomy (stoma pink and moist)   : No suprapubic tenderness or distention  MSK: Gait not assessed, joints with full ROM without pain or contractures,+ generalized weakness  Skin: Skin warm and dry, no induration  Neurologic: Cranial nerves II through XII intact, superficial touch and pain sensation intact  Psychiatric: Alert to person, place, and time, calm and cooperative     Results/Data:   1/22/24: BMP WNL, Hgb 10.2, Hct 33.3  1/15/24: Na+ 134, Chol 95, HDL 55, Hgb 10.1, Hct 31.3    Assessment/Plan:  NSTEMI/syncope and collapse/hypotension/Hx cardiac arrest-CHARO diet, c/w ASA, atorvastatin, and metoprolol (hold for SBP less than 100 or HR less than 60), monitor BP and HR, F/U with cardiologist as scheduled  Dental abscess-s/p clindamycin (end date 1/15), F/U with dentist after discharge  Physical deconditioning-c/w PT/OT, safety and fall precautions   RA-c/w Orencia, F/U with rheumatologist as scheduled     Orders:  NNO    Code Status:   Full Code

## 2024-02-16 ENCOUNTER — HOME CARE VISIT (OUTPATIENT)
Dept: HOME HEALTH SERVICES | Facility: HOME HEALTH | Age: 64
End: 2024-02-16
Payer: COMMERCIAL

## 2024-02-16 PROCEDURE — G0157 HHC PT ASSISTANT EA 15: HCPCS

## 2024-02-18 ASSESSMENT — PAIN SCALES - PAIN ASSESSMENT IN ADVANCED DEMENTIA (PAINAD)
BODYLANGUAGE: 0 - RELAXED.
NEGVOCALIZATION: 0
FACIALEXPRESSION: 0 - SMILING OR INEXPRESSIVE.
BODYLANGUAGE: 0
BREATHING: 0
FACIALEXPRESSION: 0
TOTALSCORE: 0
CONSOLABILITY: 0 - NO NEED TO CONSOLE.
CONSOLABILITY: 0
NEGVOCALIZATION: 0 - NONE.

## 2024-02-18 ASSESSMENT — ENCOUNTER SYMPTOMS
LOWEST PAIN SEVERITY IN PAST 24 HOURS: 2/10
HIGHEST PAIN SEVERITY IN PAST 24 HOURS: 8/10
PAIN LOCATION: LEFT HIP
PERSON REPORTING PAIN: PATIENT
PAIN SEVERITY GOAL: 0/10
PAIN LOCATION - PAIN SEVERITY: 6/10
PAIN: 1
PAIN LOCATION: RIGHT HIP
PAIN LOCATION - PAIN SEVERITY: 6/10

## 2024-02-20 ENCOUNTER — HOME CARE VISIT (OUTPATIENT)
Dept: HOME HEALTH SERVICES | Facility: HOME HEALTH | Age: 64
End: 2024-02-20
Payer: COMMERCIAL

## 2024-02-20 PROCEDURE — G0157 HHC PT ASSISTANT EA 15: HCPCS

## 2024-02-20 ASSESSMENT — ENCOUNTER SYMPTOMS
HIGHEST PAIN SEVERITY IN PAST 24 HOURS: 9/10
PAIN SEVERITY GOAL: 0/10
PERSON REPORTING PAIN: PATIENT
PAIN LOCATION: RIGHT HIP
PAIN LOCATION - PAIN SEVERITY: 7/10
PAIN: 1
SUBJECTIVE PAIN PROGRESSION: WAXING AND WANING
LOWEST PAIN SEVERITY IN PAST 24 HOURS: 2/10

## 2024-02-20 ASSESSMENT — PAIN SCALES - PAIN ASSESSMENT IN ADVANCED DEMENTIA (PAINAD)
TOTALSCORE: 0
CONSOLABILITY: 0 - NO NEED TO CONSOLE.
CONSOLABILITY: 0
FACIALEXPRESSION: 0 - SMILING OR INEXPRESSIVE.
BODYLANGUAGE: 0 - RELAXED.
BREATHING: 0
BODYLANGUAGE: 0
FACIALEXPRESSION: 0
NEGVOCALIZATION: 0 - NONE.
NEGVOCALIZATION: 0

## 2024-02-21 ENCOUNTER — HOME CARE VISIT (OUTPATIENT)
Dept: HOME HEALTH SERVICES | Facility: HOME HEALTH | Age: 64
End: 2024-02-21
Payer: COMMERCIAL

## 2024-02-21 ENCOUNTER — OFFICE VISIT (OUTPATIENT)
Dept: CARDIOLOGY | Facility: CLINIC | Age: 64
End: 2024-02-21
Payer: COMMERCIAL

## 2024-02-21 VITALS
SYSTOLIC BLOOD PRESSURE: 102 MMHG | BODY MASS INDEX: 16.6 KG/M2 | HEART RATE: 67 BPM | TEMPERATURE: 98.6 F | OXYGEN SATURATION: 98 % | HEIGHT: 65 IN | RESPIRATION RATE: 18 BRPM | DIASTOLIC BLOOD PRESSURE: 58 MMHG | WEIGHT: 99.6 LBS

## 2024-02-21 DIAGNOSIS — I48.0 PAROXYSMAL ATRIAL FIBRILLATION (MULTI): ICD-10-CM

## 2024-02-21 DIAGNOSIS — R93.89 ABNORMAL CHEST CT: Primary | ICD-10-CM

## 2024-02-21 DIAGNOSIS — I10 ESSENTIAL HYPERTENSION: ICD-10-CM

## 2024-02-21 PROBLEM — Z20.822 CONTACT WITH AND (SUSPECTED) EXPOSURE TO COVID-19: Status: ACTIVE | Noted: 2022-08-02

## 2024-02-21 PROBLEM — D50.9 IRON DEFICIENCY ANEMIA: Status: ACTIVE | Noted: 2024-02-21

## 2024-02-21 PROBLEM — I48.91 ATRIAL FIBRILLATION (MULTI): Status: ACTIVE | Noted: 2024-02-21

## 2024-02-21 PROBLEM — H53.2 DIPLOPIA: Status: ACTIVE | Noted: 2018-02-26

## 2024-02-21 PROBLEM — R05.9 COUGH: Status: ACTIVE | Noted: 2024-02-21

## 2024-02-21 PROBLEM — E78.5 IMMUNOSUPPRESSIVE-INDUCED HYPERLIPIDEMIA: Status: ACTIVE | Noted: 2024-02-21

## 2024-02-21 PROBLEM — M25.50 ARTHRALGIA OF MULTIPLE JOINTS: Status: ACTIVE | Noted: 2024-02-21

## 2024-02-21 PROBLEM — K57.20 COLONIC DIVERTICULAR ABSCESS: Status: ACTIVE | Noted: 2024-02-21

## 2024-02-21 PROBLEM — M62.81 MUSCLE WEAKNESS: Status: ACTIVE | Noted: 2024-02-21

## 2024-02-21 PROBLEM — M25.579 ARTHRALGIA OF ANKLE: Status: ACTIVE | Noted: 2024-02-21

## 2024-02-21 PROBLEM — T45.1X5A IMMUNOSUPPRESSIVE-INDUCED HYPERLIPIDEMIA: Status: ACTIVE | Noted: 2024-02-21

## 2024-02-21 PROBLEM — R63.4 WEIGHT LOSS: Status: ACTIVE | Noted: 2024-02-21

## 2024-02-21 PROBLEM — E87.6 HYPOKALEMIA: Status: ACTIVE | Noted: 2024-02-21

## 2024-02-21 PROBLEM — K57.32 DIVERTICULITIS OF LARGE INTESTINE WITHOUT PERFORATION OR ABSCESS WITHOUT BLEEDING: Status: ACTIVE | Noted: 2024-02-21

## 2024-02-21 PROBLEM — I46.9 CARDIAC ARREST WITH SUCCESSFUL RESUSCITATION (MULTI): Status: ACTIVE | Noted: 2024-02-21

## 2024-02-21 PROBLEM — M35.3 POLYMYALGIA RHEUMATICA (MULTI): Status: ACTIVE | Noted: 2024-02-21

## 2024-02-21 PROBLEM — B34.2 CORONAVIRUS INFECTION: Status: ACTIVE | Noted: 2024-02-21

## 2024-02-21 PROBLEM — D64.9 ANEMIA: Status: ACTIVE | Noted: 2024-02-21

## 2024-02-21 PROBLEM — G89.4 CHRONIC PAIN DISORDER: Status: ACTIVE | Noted: 2024-02-21

## 2024-02-21 PROBLEM — M25.562 CHRONIC PAIN OF LEFT KNEE: Status: ACTIVE | Noted: 2022-12-13

## 2024-02-21 PROBLEM — K57.20 PERFORATED DIVERTICULUM OF LARGE INTESTINE: Status: ACTIVE | Noted: 2024-02-21

## 2024-02-21 PROBLEM — K91.89 ANASTOMOTIC LEAK OF INTESTINE: Status: ACTIVE | Noted: 2024-02-21

## 2024-02-21 PROBLEM — G89.29 CHRONIC PAIN OF LEFT KNEE: Status: ACTIVE | Noted: 2022-12-13

## 2024-02-21 PROCEDURE — 1036F TOBACCO NON-USER: CPT | Performed by: INTERNAL MEDICINE

## 2024-02-21 PROCEDURE — 99214 OFFICE O/P EST MOD 30 MIN: CPT | Performed by: INTERNAL MEDICINE

## 2024-02-21 PROCEDURE — 3074F SYST BP LT 130 MM HG: CPT | Performed by: INTERNAL MEDICINE

## 2024-02-21 PROCEDURE — 3078F DIAST BP <80 MM HG: CPT | Performed by: INTERNAL MEDICINE

## 2024-02-21 RX ORDER — GUAIFENESIN AND DEXTROMETHORPHAN HYDROBROMIDE 10; 100 MG/5ML; MG/5ML
SYRUP ORAL EVERY 4 HOURS PRN
COMMUNITY
Start: 2024-01-06 | End: 2024-02-21 | Stop reason: ALTCHOICE

## 2024-02-21 RX ORDER — AMOXICILLIN AND CLAVULANATE POTASSIUM 500; 125 MG/1; MG/1
TABLET, FILM COATED ORAL
COMMUNITY
Start: 2024-01-02 | End: 2024-02-21 | Stop reason: ALTCHOICE

## 2024-02-21 RX ORDER — TALC
POWDER (GRAM) TOPICAL DAILY PRN
COMMUNITY
Start: 2024-01-06 | End: 2024-02-21 | Stop reason: ALTCHOICE

## 2024-02-21 RX ORDER — GUAIFENESIN 600 MG/1
TABLET, EXTENDED RELEASE ORAL
COMMUNITY
Start: 2024-01-06 | End: 2024-02-21 | Stop reason: ALTCHOICE

## 2024-02-21 SDOH — HEALTH STABILITY: PHYSICAL HEALTH: EXERCISE TYPE: HEP

## 2024-02-21 ASSESSMENT — PATIENT HEALTH QUESTIONNAIRE - PHQ9
2. FEELING DOWN, DEPRESSED OR HOPELESS: NOT AT ALL
SUM OF ALL RESPONSES TO PHQ9 QUESTIONS 1 AND 2: 0
1. LITTLE INTEREST OR PLEASURE IN DOING THINGS: NOT AT ALL

## 2024-02-21 ASSESSMENT — PAIN SCALES - GENERAL: PAINLEVEL: 0-NO PAIN

## 2024-02-21 ASSESSMENT — ENCOUNTER SYMPTOMS
PAIN: 1
PAIN LOCATION: RIGHT HIP
PERSON REPORTING PAIN: PATIENT
LOWEST PAIN SEVERITY IN PAST 24 HOURS: 2/10
PAIN SEVERITY GOAL: 0/10
SUBJECTIVE PAIN PROGRESSION: GRADUALLY IMPROVING
HIGHEST PAIN SEVERITY IN PAST 24 HOURS: 6/10

## 2024-02-21 ASSESSMENT — ACTIVITIES OF DAILY LIVING (ADL)
HOME_HEALTH_OASIS: 00
OASIS_M1830: 01

## 2024-02-21 NOTE — PROGRESS NOTES
History of present illness:  This is a very pleasant 63-year-old female following up in my office on history of SVT. Feels much better since we started her on metoprolol and she is on the higher dose beta-blocker. Less episodes of palpitations. She underwent monitor for 2 weeks that showed no atrial fibrillation primarily SVT, patient also had a stress test and 2D echo this year within normal limit.  Patient still having breakthrough episodes of palpitations last one 2 weeks ago lasting for 10 minutes aborted on its own.  Had a recent hospitalization for dental infection with sepsis was found to have mildly elevated troponin was seen by Dr. Smart.  Finding of troponin was related to sepsis.  Denies having any chest pain shortness of breath palpitations or dizziness.   Past Medical History:   Diagnosis Date    AMI (acute myocardial infarction) (CMS/Formerly [facility])     Anxiety     Arthritis     At risk for falling     Atrial fibrillation (CMS/Formerly [facility])     Colostomy care (CMS/Formerly [facility])     Dental abscess     Encounter for screening for other viral diseases 02/08/2016    Need for hepatitis B screening test    Hyperlipidemia     Hypertension     Muscle weakness (generalized)     Muscle weakness    Osteoarthritis     Pain in unspecified joint     Multiple joint pain    Pneumonia, unspecified organism 11/10/2015    Community acquired pneumonia    Rheumatoid arthritis (CMS/Formerly [facility])     Syncope     Weakness        Past Surgical History:   Procedure Laterality Date    COLECTOMY      CT GUIDED PERCUTANEOUS PERITONEAL OR RETROPERITONEAL FLUID COLLECTION DRAINAGE  01/31/2022    CT GUIDED PERCUTANEOUS PERITONEAL OR RETROPERITONEAL FLUID COLLECTION DRAINAGE [facility]    CT GUIDED PERCUTANEOUS PERITONEAL OR RETROPERITONEAL FLUID COLLECTION DRAINAGE  08/10/2022    CT GUIDED PERCUTANEOUS PERITONEAL OR RETROPERITONEAL FLUID COLLECTION DRAINAGE [facility]       Allergies   Allergen Reactions    Duloxetine GI Upset        reports that  she has never smoked. She has never been exposed to tobacco smoke. She has never used smokeless tobacco. She reports that she does not drink alcohol and does not use drugs.    Family History   Problem Relation Name Age of Onset    Diabetes Mother moshe     Diabetes type II Mother moshe     Other (psoriatic arthritis) Father      Diabetes Father         Patient's Medications   New Prescriptions    No medications on file   Previous Medications    ABATACEPT (ORENCIA CLICKJECT) 125 MG/ML AUTO-INJECTOR AUTO-INJECTION    Inject 1 mL (125 mg) under the skin 1 (one) time per week.    ACETAMINOPHEN (TYLENOL) 325 MG TABLET    Take 2 tablets (650 mg) by mouth every 6 hours if needed for mild pain (1 - 3).    ASPIRIN 81 MG EC TABLET    Take 1 tablet (81 mg) by mouth once daily. Do not start before January 13, 2024.    ASPIRIN-ACETAMINOPHEN-CAFFEINE (EXCEDRIN EXTRA STRENGTH) 250-250-65 MG TABLET    Take 2 tablets by mouth every 6 hours if needed.    ATORVASTATIN (LIPITOR) 20 MG TABLET    Take 1 tablet (20 mg) by mouth once daily.    METOPROLOL TARTRATE (LOPRESSOR) 25 MG TABLET    Take 0.5 tablets (12.5 mg) by mouth 2 times a day. HOLD FOR BP LESS THEN 100 OR HR LESS THEN 60    ONDANSETRON ODT (ZOFRAN-ODT) 4 MG DISINTEGRATING TABLET    Take 1 tablet (4 mg) by mouth every 8 hours if needed for nausea or vomiting.    POLYETHYLENE GLYCOL (GLYCOLAX, MIRALAX) 17 GRAM PACKET    Take 17 g by mouth once daily as needed (constipation).   Modified Medications    No medications on file   Discontinued Medications    AMOXICILLIN-POT CLAVULANATE (AUGMENTIN) 500-125 MG TABLET    TAKE ONE TABLET BY MOUTH THREE TIMES DAILY UNTIL GONE    DEXTROMETHORPHAN-GUAIFENESIN  MG/5 ML ORAL LIQUID    Take by mouth every 4 hours if needed.    GUAIFENESIN (MUCINEX) 600 MG 12 HR TABLET    Take by mouth.    MELATONIN 3 MG TABLET    Take by mouth once daily as needed.       Objective   Physical Exam  General: Patient in no acute distress   HEENT:  Atraumatic normocephalic.  Neck: Supple, jugular venous pressure within normal limit.  No bruits  Lungs: Clear to auscultation bilaterally  Cardiovascular: Regular rate and rhythm, normal heart sounds, no murmurs rubs or gallops  Abdomen: Soft nontender nondistended.  Normal bowel sounds.  Extremities: Warm to touch, no edema.      Lab Review   Admission on 01/10/2024, Discharged on 01/12/2024   Component Date Value    Ventricular Rate 01/10/2024 69     Atrial Rate 01/10/2024 69     NE Interval 01/10/2024 118     QRS Duration 01/10/2024 70     QT Interval 01/10/2024 392     QTC Calculation(Bazett) 01/10/2024 420     P Axis 01/10/2024 54     R Axis 01/10/2024 77     T [place] 01/10/2024 57     QRS Count 01/10/2024 11     Q Onset 01/10/2024 222     P Onset 01/10/2024 163     P Offset 01/10/2024 204     T Offset 01/10/2024 418     QTC Fredericia 01/10/2024 410     Troponin I, High Sensiti* 01/10/2024 16 (H)     Lactate 01/10/2024 0.6     POCT pH, Venous 01/10/2024 7.47 (H)     POCT pCO2, Venous 01/10/2024 37 (L)     POCT pO2, Venous 01/10/2024 59 (H)     POCT SO2, Venous 01/10/2024 91 (H)     POCT Oxy Hemoglobin, Alphonse* 01/10/2024 88.8 (H)     POCT Hematocrit Calculat* 01/10/2024 47.0 (H)     POCT Sodium, Venous 01/10/2024 133 (L)     POCT Potassium, Venous 01/10/2024 3.8     POCT Chloride, Venous 01/10/2024 103     POCT Ionized Calicum, Ve* 01/10/2024 1.18     POCT Glucose, Venous 01/10/2024 99     POCT Lactate, Venous 01/10/2024 1.1     POCT Base Excess, Venous 01/10/2024 3.3 (H)     POCT HCO3 Calculated, Ve* 01/10/2024 26.9 (H)     POCT Hemoglobin, Venous 01/10/2024 15.8     POCT Anion Gap, Venous 01/10/2024 7.0 (L)     Patient Temperature 01/10/2024      FiO2 01/10/2024 21     BNP 01/10/2024 468 (H)     WBC 01/10/2024 5.5     nRBC 01/10/2024 0.0     RBC 01/10/2024 3.55 (L)     Hemoglobin 01/10/2024 9.8 (L)     Hematocrit 01/10/2024 32.4 (L)     MCV 01/10/2024 91     MCH 01/10/2024 27.6     MCHC 01/10/2024 30.2 (L)      RDW 01/10/2024 11.9     Platelets 01/10/2024 177     aPTT 01/10/2024 32     Protime 01/10/2024 13.3 (H)     INR 01/10/2024 1.2 (H)     WBC 01/11/2024 5.0     nRBC 01/11/2024 0.0     RBC 01/11/2024 3.68 (L)     Hemoglobin 01/11/2024 10.2 (L)     Hematocrit 01/11/2024 32.6 (L)     MCV 01/11/2024 89     MCH 01/11/2024 27.7     MCHC 01/11/2024 31.3 (L)     RDW 01/11/2024 11.8     Platelets 01/11/2024 190     Glucose 01/11/2024 92     Sodium 01/11/2024 137     Potassium 01/11/2024 3.3 (L)     Chloride 01/11/2024 104     Bicarbonate 01/11/2024 25     Anion Gap 01/11/2024 11     Urea Nitrogen 01/11/2024 6     Creatinine 01/11/2024 0.53     eGFR 01/11/2024 >90     Calcium 01/11/2024 8.0 (L)     Glucose 01/12/2024 89     Sodium 01/12/2024 138     Potassium 01/12/2024 3.9     Chloride 01/12/2024 106     Bicarbonate 01/12/2024 25     Anion Gap 01/12/2024 11     Urea Nitrogen 01/12/2024 10     Creatinine 01/12/2024 0.57     eGFR 01/12/2024 >90     Calcium 01/12/2024 8.2 (L)     WBC 01/12/2024 6.3     nRBC 01/12/2024 0.0     RBC 01/12/2024 3.65 (L)     Hemoglobin 01/12/2024 9.9 (L)     Hematocrit 01/12/2024 32.2 (L)     MCV 01/12/2024 88     MCH 01/12/2024 27.1     MCHC 01/12/2024 30.7 (L)     RDW 01/12/2024 11.9     Platelets 01/12/2024 225     Coronavirus 2019, PCR 01/12/2024 Not Detected    Admission on 01/05/2024, Discharged on 01/10/2024   Component Date Value    WBC 01/05/2024 4.3 (L)     nRBC 01/05/2024 0.0     RBC 01/05/2024 4.30     Hemoglobin 01/05/2024 11.7 (L)     Hematocrit 01/05/2024 39.1     MCV 01/05/2024 91     MCH 01/05/2024 27.2     MCHC 01/05/2024 29.9 (L)     RDW 01/05/2024 12.4     Platelets 01/05/2024 171     Glucose 01/05/2024 106 (H)     Sodium 01/05/2024 140     Potassium 01/05/2024 3.5     Chloride 01/05/2024 105     Bicarbonate 01/05/2024 21     Anion Gap 01/05/2024 18     Urea Nitrogen 01/05/2024 11     Creatinine 01/05/2024 0.61     eGFR 01/05/2024 >90     Calcium 01/05/2024 8.4 (L)     Lactate  01/05/2024 1.8     Ventricular Rate 01/05/2024 91     Atrial Rate 01/05/2024 91     ID Interval 01/05/2024 114     QRS Duration 01/05/2024 60     QT Interval 01/05/2024 354     QTC Calculation(Bazett) 01/05/2024 435     P Axis 01/05/2024 64     R Tad 01/05/2024 79     T Axis 01/05/2024 46     QRS Count 01/05/2024 15     Q Onset 01/05/2024 224     P Onset 01/05/2024 167     P Offset 01/05/2024 213     T Offset 01/05/2024 401     QTC Fredericia 01/05/2024 407     Glucose 01/05/2024 108 (H)     Sodium 01/05/2024 140     Potassium 01/05/2024 3.5     Chloride 01/05/2024 105     Bicarbonate 01/05/2024 20 (L)     Anion Gap 01/05/2024 19     Urea Nitrogen 01/05/2024 11     Creatinine 01/05/2024 0.63     eGFR 01/05/2024 >90     Calcium 01/05/2024 8.4 (L)     Albumin 01/05/2024 3.4     Alkaline Phosphatase 01/05/2024 58     Total Protein 01/05/2024 6.4     AST 01/05/2024 22     Bilirubin, Total 01/05/2024 0.4     ALT 01/05/2024 16     Troponin I, High Sensiti* 01/05/2024 94 (HH)     Flu A Result 01/05/2024 Not Detected     Flu B Result 01/05/2024 Not Detected     Coronavirus 2019, PCR 01/05/2024 Not Detected     Troponin I, High Sensiti* 01/05/2024 127 (HH)     Magnesium 01/05/2024 1.51 (L)     aPTT 01/05/2024 27     Troponin I, High Sensiti* 01/05/2024 170 (HH)     Ventricular Rate 01/05/2024 91     Atrial Rate 01/05/2024 91     ID Interval 01/05/2024 118     QRS Duration 01/05/2024 60     QT Interval 01/05/2024 348     QTC Calculation(Bazett) 01/05/2024 428     P Axis 01/05/2024 33     R Tad 01/05/2024 75     T Axis 01/05/2024 1     QRS Count 01/05/2024 15     Q Onset 01/05/2024 229     P Onset 01/05/2024 170     P Offset 01/05/2024 219     T Offset 01/05/2024 403     QTC Fredericia 01/05/2024 400     Heparin Unfractionated 01/05/2024 0.7     Heparin Unfractionated 01/05/2024 0.8     Heparin Unfractionated 01/06/2024 0.7     Glucose 01/06/2024 92     Sodium 01/06/2024 140     Potassium 01/06/2024 3.6     Chloride  01/06/2024 106     Bicarbonate 01/06/2024 25     Anion Gap 01/06/2024 13     Urea Nitrogen 01/06/2024 9     Creatinine 01/06/2024 0.48 (L)     eGFR 01/06/2024 >90     Calcium 01/06/2024 8.3 (L)     Albumin 01/06/2024 3.1 (L)     Alkaline Phosphatase 01/06/2024 47     Total Protein 01/06/2024 5.8 (L)     AST 01/06/2024 21     Bilirubin, Total 01/06/2024 0.3     ALT 01/06/2024 13     Troponin I, High Sensiti* 01/06/2024 75 (HH)     Heparin Unfractionated 01/06/2024 0.7     Magnesium 01/06/2024 1.59 (L)     Heparin Unfractionated 01/06/2024 0.2     D-Dimer, Quantitative VT* 01/06/2024 5,833 (H)     Heparin Unfractionated 01/06/2024 0.3     WBC 01/06/2024 3.5 (L)     nRBC 01/06/2024 0.0     RBC 01/06/2024 3.76 (L)     Hemoglobin 01/06/2024 10.4 (L)     Hematocrit 01/06/2024 33.5 (L)     MCV 01/06/2024 89     MCH 01/06/2024 27.7     MCHC 01/06/2024 31.0 (L)     RDW 01/06/2024 12.3     Platelets 01/06/2024 172     WBC 01/07/2024 3.3 (L)     nRBC 01/07/2024 0.0     RBC 01/07/2024 3.74 (L)     Hemoglobin 01/07/2024 10.4 (L)     Hematocrit 01/07/2024 34.0 (L)     MCV 01/07/2024 91     MCH 01/07/2024 27.8     MCHC 01/07/2024 30.6 (L)     RDW 01/07/2024 12.3     Platelets 01/07/2024 176     Glucose 01/07/2024 94     Sodium 01/07/2024 139     Potassium 01/07/2024 3.4 (L)     Chloride 01/07/2024 108 (H)     Bicarbonate 01/07/2024 25     Anion Gap 01/07/2024 9 (L)     Urea Nitrogen 01/07/2024 6     Creatinine 01/07/2024 0.44 (L)     eGFR 01/07/2024 >90     Calcium 01/07/2024 7.8 (L)     Heparin Unfractionated 01/06/2024 0.3     Heparin Unfractionated 01/07/2024 0.5     Heparin Unfractionated 01/08/2024 <0.1     Blood Culture 01/07/2024 No growth at 4 days -  FINAL REPORT     Blood Culture 01/07/2024 No growth at 4 days -  FINAL REPORT     Extra Tube 01/07/2024 Hold for add-ons.     Extra Tube 01/07/2024 Hold for add-ons.     Extra Tube 01/08/2024 Hold for add-ons.     Extra Tube 01/08/2024 Hold for add-ons.     Extra Tube  01/08/2024 Hold for add-ons.     AV pk kermit 01/08/2024 1.01     LVOT diam 01/08/2024 2.00     LV biplane EF 01/08/2024 62     MV avg E/e' ratio 01/08/2024 6.25     MV E/A ratio 01/08/2024 1.54     LA vol index A/L 01/08/2024 16.8     Tricuspid annular plane * 01/08/2024 1.3     RV free wall pk S' 01/08/2024 17.10     LVIDd 01/08/2024 3.06     RVSP 01/08/2024 12.5     Aortic Valve Area by Con* 01/08/2024 2.06     AV pk grad 01/08/2024 4.1     LV A4C EF 01/08/2024 60.1     WBC 01/08/2024 2.7 (L)     nRBC 01/08/2024 0.0     RBC 01/08/2024 3.53 (L)     Hemoglobin 01/08/2024 10.0 (L)     Hematocrit 01/08/2024 34.0 (L)     MCV 01/08/2024 96     MCH 01/08/2024 28.3     MCHC 01/08/2024 29.4 (L)     RDW 01/08/2024 12.8     Platelets 01/08/2024 159     Glucose 01/08/2024 87     Sodium 01/08/2024 143     Potassium 01/08/2024 4.1     Chloride 01/08/2024 113 (H)     Bicarbonate 01/08/2024 21     Anion Gap 01/08/2024 13     Urea Nitrogen 01/08/2024 7     Creatinine 01/08/2024 0.47 (L)     eGFR 01/08/2024 >90     Calcium 01/08/2024 7.9 (L)     WBC 01/09/2024 4.6     nRBC 01/09/2024 0.0     RBC 01/09/2024 3.81 (L)     Hemoglobin 01/09/2024 10.5 (L)     Hematocrit 01/09/2024 37.4     MCV 01/09/2024 98     MCH 01/09/2024 27.6     MCHC 01/09/2024 28.1 (L)     RDW 01/09/2024 12.0     Platelets 01/09/2024 162     Glucose 01/09/2024 95     Sodium 01/09/2024 140     Potassium 01/09/2024 3.8     Chloride 01/09/2024 110 (H)     Bicarbonate 01/09/2024 22     Anion Gap 01/09/2024 12     Urea Nitrogen 01/09/2024 5 (L)     Creatinine 01/09/2024 0.46 (L)     eGFR 01/09/2024 >90     Calcium 01/09/2024 7.9 (L)     Extra Tube 01/09/2024 Hold for add-ons.         Assessment/Plan   Patient Active Problem List   Diagnosis    Essential hypertension    Elevated troponin    Syncope and collapse    Dental abscess    Rheumatoid arthritis involving multiple sites with positive rheumatoid factor (CMS/HCC)    Diverticulosis    History of cardiac arrest     Anxiety    Anastomotic leak of intestine    Anemia    Arthralgia of ankle    Arthralgia of multiple joints    Atrial fibrillation (CMS/HCC)    Cardiac arrest with successful resuscitation (CMS/HCC)    Chronic pain disorder    Chronic pain of left knee    Colonic diverticular abscess    Contact with and (suspected) exposure to covid-19    Coronavirus infection    Cough    Diplopia    Diverticulitis of large intestine without perforation or abscess without bleeding    Hypokalemia    Immunosuppressive-induced hyperlipidemia    Iron deficiency anemia    Muscle weakness    Perforated diverticulum of large intestine    Polymyalgia rheumatica (CMS/HCC)    Weight loss      This is a very pleasant 63-year-old female following up in my office on history of SVT. Feels much better since we started her on metoprolol and she is on the higher dose beta-blocker. Less episodes of palpitations. She underwent monitor for 2 weeks that showed no atrial fibrillation primarily SVT, patient also had a stress test and 2D echo this year within normal limit.  Patient still having breakthrough episodes of palpitations last one 2 weeks ago lasting for 10 minutes aborted on its own.  Had a recent hospitalization for dental infection with sepsis was found to have mildly elevated troponin was seen by Dr. Smart.  Finding of troponin was related to sepsis.  Denies having any chest pain shortness of breath palpitations or dizziness.  Offered her again EP evaluation she would like to hold off.  Her chest CT showed multiple lesions and palpitations I will refer her to Dr. Frias for evaluation of that.  Will obtain coronary calcium score to assess her plaque burden and need for statin since she is not taking it currently.  We will follow-up in 3 months.  Okay to proceed with surgery at low risk    Tesfaye Corona MD

## 2024-05-03 ENCOUNTER — HOSPITAL ENCOUNTER (OUTPATIENT)
Dept: RADIOLOGY | Facility: HOSPITAL | Age: 64
Discharge: HOME | End: 2024-05-03
Payer: COMMERCIAL

## 2024-05-03 DIAGNOSIS — R91.8 OTHER NONSPECIFIC ABNORMAL FINDING OF LUNG FIELD: ICD-10-CM

## 2024-05-03 DIAGNOSIS — R93.89 ABNORMAL CHEST CT: ICD-10-CM

## 2024-05-03 DIAGNOSIS — I48.20 CHRONIC ATRIAL FIBRILLATION, UNSPECIFIED (MULTI): ICD-10-CM

## 2024-05-03 DIAGNOSIS — M06.9 RHEUMATOID ARTHRITIS, UNSPECIFIED (MULTI): ICD-10-CM

## 2024-05-03 PROCEDURE — 71250 CT THORAX DX C-: CPT | Performed by: STUDENT IN AN ORGANIZED HEALTH CARE EDUCATION/TRAINING PROGRAM

## 2024-05-03 PROCEDURE — 75571 CT HRT W/O DYE W/CA TEST: CPT

## 2024-05-03 PROCEDURE — 71250 CT THORAX DX C-: CPT

## 2024-05-16 ENCOUNTER — TELEPHONE (OUTPATIENT)
Dept: CARDIOLOGY | Facility: CLINIC | Age: 64
End: 2024-05-16

## 2024-05-16 NOTE — TELEPHONE ENCOUNTER
----- Message from Tesfaye Corona MD sent at 5/8/2024 11:25 AM EDT -----  Tell patient that her calcium score was okay overall low risk.  ----- Message -----  From: Cindy, Radiology Results In  Sent: 5/7/2024   9:13 AM EDT  To: Tesfaye Corona MD

## 2024-05-18 PROBLEM — R93.89 ABNORMAL COMPUTERIZED AXIAL TOMOGRAPHY OF CHEST: Status: ACTIVE | Noted: 2024-05-18

## 2024-05-18 RX ORDER — IBUPROFEN 600 MG/1
600 TABLET ORAL EVERY 6 HOURS PRN
COMMUNITY
Start: 2024-04-16

## 2024-05-28 ENCOUNTER — OFFICE VISIT (OUTPATIENT)
Dept: CARDIOLOGY | Facility: CLINIC | Age: 64
End: 2024-05-28
Payer: COMMERCIAL

## 2024-05-28 VITALS
OXYGEN SATURATION: 98 % | RESPIRATION RATE: 18 BRPM | TEMPERATURE: 98.6 F | WEIGHT: 102 LBS | SYSTOLIC BLOOD PRESSURE: 103 MMHG | BODY MASS INDEX: 17.42 KG/M2 | HEART RATE: 65 BPM | HEIGHT: 64 IN | DIASTOLIC BLOOD PRESSURE: 57 MMHG

## 2024-05-28 DIAGNOSIS — R79.89 ELEVATED TROPONIN: ICD-10-CM

## 2024-05-28 DIAGNOSIS — I21.4 NSTEMI (NON-ST ELEVATED MYOCARDIAL INFARCTION) (MULTI): ICD-10-CM

## 2024-05-28 DIAGNOSIS — Z86.74 HISTORY OF CARDIAC ARREST: Primary | ICD-10-CM

## 2024-05-28 DIAGNOSIS — E78.5 IMMUNOSUPPRESSIVE-INDUCED HYPERLIPIDEMIA: ICD-10-CM

## 2024-05-28 DIAGNOSIS — R93.89 ABNORMAL CHEST CT: ICD-10-CM

## 2024-05-28 DIAGNOSIS — T45.1X5A IMMUNOSUPPRESSIVE-INDUCED HYPERLIPIDEMIA: ICD-10-CM

## 2024-05-28 PROCEDURE — 3078F DIAST BP <80 MM HG: CPT | Performed by: INTERNAL MEDICINE

## 2024-05-28 PROCEDURE — 1036F TOBACCO NON-USER: CPT | Performed by: INTERNAL MEDICINE

## 2024-05-28 PROCEDURE — 99213 OFFICE O/P EST LOW 20 MIN: CPT | Performed by: INTERNAL MEDICINE

## 2024-05-28 PROCEDURE — 3074F SYST BP LT 130 MM HG: CPT | Performed by: INTERNAL MEDICINE

## 2024-05-28 ASSESSMENT — ENCOUNTER SYMPTOMS
LOSS OF SENSATION IN FEET: 0
DEPRESSION: 0
OCCASIONAL FEELINGS OF UNSTEADINESS: 1

## 2024-05-28 ASSESSMENT — LIFESTYLE VARIABLES
AUDIT-C TOTAL SCORE: 0
SKIP TO QUESTIONS 9-10: 1
HOW OFTEN DO YOU HAVE SIX OR MORE DRINKS ON ONE OCCASION: NEVER
HOW MANY STANDARD DRINKS CONTAINING ALCOHOL DO YOU HAVE ON A TYPICAL DAY: PATIENT DOES NOT DRINK
HOW OFTEN DO YOU HAVE A DRINK CONTAINING ALCOHOL: NEVER

## 2024-05-28 ASSESSMENT — PATIENT HEALTH QUESTIONNAIRE - PHQ9
1. LITTLE INTEREST OR PLEASURE IN DOING THINGS: NOT AT ALL
2. FEELING DOWN, DEPRESSED OR HOPELESS: NOT AT ALL
SUM OF ALL RESPONSES TO PHQ9 QUESTIONS 1 AND 2: 0

## 2024-05-28 ASSESSMENT — PAIN SCALES - GENERAL: PAINLEVEL: 0-NO PAIN

## 2024-05-28 NOTE — PROGRESS NOTES
History of present illness:  This is a very pleasant 63-year-old female following up in my office on history of SVT. Feels much better since we started her on metoprolol and she is on the higher dose beta-blocker. Less episodes of palpitations. She underwent monitor for 2 weeks that showed no atrial fibrillation primarily SVT, patient also had a stress test and 2D echo this year within normal limit. Patient also had coronary calcium score that showed low risk overall.Total calcium score was 14.  She did have some density seen on her chest CT she was seen by Dr. Frias with repeated CAT scan that showed stable disease. Patient returns to my office for follow-up.  Has been feeling overall okay.     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/21/2024    Muscle weakness (generalized)     Muscle weakness    Osteoarthritis     Pain in unspecified joint     Multiple joint pain    Pneumonia, unspecified organism 11/10/2015    Community acquired pneumonia    Rheumatoid arthritis (Multi)     Syncope     Weakness        Past Surgical History:   Procedure Laterality Date    COLECTOMY      CT GUIDED PERCUTANEOUS PERITONEAL OR RETROPERITONEAL FLUID COLLECTION DRAINAGE  01/31/2022    CT GUIDED PERCUTANEOUS PERITONEAL OR RETROPERITONEAL FLUID COLLECTION DRAINAGE [facility]PATIENT LEGAC[facility]    CT GUIDED PERCUTANEOUS PERITONEAL OR RETROPERITONEAL FLUID COLLECTION DRAINAGE  08/10/2022    CT GUIDED PERCUTANEOUS PERITONEAL  OR RETROPERITONEAL FLUID COLLECTION DRAINAGE [facility] INPATIENT LEGACY       Allergies   Allergen Reactions    Duloxetine GI Upset        reports that she has never smoked. She has never been exposed to tobacco smoke. She has never used smokeless tobacco. She reports that she does not drink alcohol and does not use drugs.    Family History   Problem Relation Name Age of Onset    Diabetes Mother moshe     Diabetes type II Mother moshe     Other (psoriatic arthritis) Father      Diabetes Father         Patient's Medications   New Prescriptions    No medications on file   Previous Medications    ABATACEPT (ORENCIA CLICKJECT) 125 MG/ML AUTO-INJECTOR AUTO-INJECTION    Inject 1 mL (125 mg) under the skin 1 (one) time per week.    ACETAMINOPHEN (TYLENOL) 325 MG TABLET    Take 2 tablets (650 mg) by mouth every 6 hours if needed for mild pain (1 - 3).    ASPIRIN-ACETAMINOPHEN-CAFFEINE (EXCEDRIN EXTRA STRENGTH) 250-250-65 MG TABLET    Take 2 tablets by mouth every 6 hours if needed.    ATORVASTATIN (LIPITOR) 20 MG TABLET    Take 1 tablet (20 mg) by mouth once daily.     MG TABLET    Take 1 tablet (600 mg) by mouth every 6 hours if needed.    METOPROLOL TARTRATE (LOPRESSOR) 50 MG TABLET    Take 1 tablet (50 mg) by mouth once daily. HOLD FOR BP LESS THEN 100 OR HR LESS THEN 60    ONDANSETRON ODT (ZOFRAN-ODT) 4 MG DISINTEGRATING TABLET    Take 1 tablet (4 mg) by mouth every 8 hours if needed for nausea or vomiting.   Modified Medications    No medications on file   Discontinued Medications    ASPIRIN 81 MG EC TABLET    Take 1 tablet (81 mg) by mouth once daily. Do not start before January 13, 2024.    POLYETHYLENE GLYCOL (GLYCOLAX, MIRALAX) 17 GRAM PACKET    Take 17 g by mouth once daily as needed (constipation).       Objective   Physical Exam  General: Patient in no acute distress   HEENT: Atraumatic normocephalic.  Neck: Supple, jugular venous pressure within normal limit.  No bruits  Lungs: Clear to auscultation  bilaterally  Cardiovascular: Regular rate and rhythm, normal heart sounds, no murmurs rubs or gallops  Abdomen: Soft nontender nondistended.  Normal bowel sounds.  Extremities: Warm to touch, no edema.    Lab Review   No visits with results within 2 Month(s) from this visit.   Latest known visit with results is:   Admission on 01/10/2024, Discharged on 01/12/2024   Component Date Value    Ventricular Rate 01/10/2024 69     Atrial Rate 01/10/2024 69     CA Interval 01/10/2024 118     QRS Duration 01/10/2024 70     QT Interval 01/10/2024 392     QTC Calculation(Bazett) 01/10/2024 420     P Axis 01/10/2024 54     R Axis 01/10/2024 77     T [place] 01/10/2024 57     QRS Count 01/10/2024 11     Q Onset 01/10/2024 222     P Onset 01/10/2024 163     P Offset 01/10/2024 204     T Offset 01/10/2024 418     QTC [place] 01/10/2024 410     Troponin I, High Sensiti* 01/10/2024 16 (H)     Lactate 01/10/2024 0.6     POCT pH, Venous 01/10/2024 7.47 (H)     POCT pCO2, Venous 01/10/2024 37 (L)     POCT pO2, Venous 01/10/2024 59 (H)     POCT SO2, Venous 01/10/2024 91 (H)     POCT Oxy Hemoglobin, Alphonse* 01/10/2024 88.8 (H)     POCT Hematocrit Calculat* 01/10/2024 47.0 (H)     POCT Sodium, Venous 01/10/2024 133 (L)     POCT Potassium, Venous 01/10/2024 3.8     POCT Chloride, Venous 01/10/2024 103     POCT Ionized Calicum, Ve* 01/10/2024 1.18     POCT Glucose, Venous 01/10/2024 99     POCT Lactate, Venous 01/10/2024 1.1     POCT Base Excess, Venous 01/10/2024 3.3 (H)     POCT HCO3 Calculated, Ve* 01/10/2024 26.9 (H)     POCT Hemoglobin, Venous 01/10/2024 15.8     POCT Anion Gap, Venous 01/10/2024 7.0 (L)     Patient Temperature 01/10/2024      FiO2 01/10/2024 21     BNP 01/10/2024 468 (H)     WBC 01/10/2024 5.5     nRBC 01/10/2024 0.0     RBC 01/10/2024 3.55 (L)     Hemoglobin 01/10/2024 9.8 (L)     Hematocrit 01/10/2024 32.4 (L)     MCV 01/10/2024 91     MCH 01/10/2024 27.6     MCHC 01/10/2024 30.2 (L)     RDW 01/10/2024 11.9      Platelets 01/10/2024 177     aPTT 01/10/2024 32     Protime 01/10/2024 13.3 (H)     INR 01/10/2024 1.2 (H)     WBC 01/11/2024 5.0     nRBC 01/11/2024 0.0     RBC 01/11/2024 3.68 (L)     Hemoglobin 01/11/2024 10.2 (L)     Hematocrit 01/11/2024 32.6 (L)     MCV 01/11/2024 89     MCH 01/11/2024 27.7     MCHC 01/11/2024 31.3 (L)     RDW 01/11/2024 11.8     Platelets 01/11/2024 190     Glucose 01/11/2024 92     Sodium 01/11/2024 137     Potassium 01/11/2024 3.3 (L)     Chloride 01/11/2024 104     Bicarbonate 01/11/2024 25     Anion Gap 01/11/2024 11     Urea Nitrogen 01/11/2024 6     Creatinine 01/11/2024 0.53     eGFR 01/11/2024 >90     Calcium 01/11/2024 8.0 (L)     Glucose 01/12/2024 89     Sodium 01/12/2024 138     Potassium 01/12/2024 3.9     Chloride 01/12/2024 106     Bicarbonate 01/12/2024 25     Anion Gap 01/12/2024 11     Urea Nitrogen 01/12/2024 10     Creatinine 01/12/2024 0.57     eGFR 01/12/2024 >90     Calcium 01/12/2024 8.2 (L)     WBC 01/12/2024 6.3     nRBC 01/12/2024 0.0     RBC 01/12/2024 3.65 (L)     Hemoglobin 01/12/2024 9.9 (L)     Hematocrit 01/12/2024 32.2 (L)     MCV 01/12/2024 88     MCH 01/12/2024 27.1     MCHC 01/12/2024 30.7 (L)     RDW 01/12/2024 11.9     Platelets 01/12/2024 225     Coronavirus 2019, PCR 01/12/2024 Not Detected         Assessment/Plan   Patient Active Problem List   Diagnosis    Essential hypertension    Elevated troponin    Syncope and collapse    Dental abscess    Rheumatoid arthritis involving multiple sites with positive rheumatoid factor (Multi)    Diverticulosis    History of cardiac arrest    Anxiety    Anastomotic leak of intestine    Anemia    Arthralgia of ankle    Arthralgia of multiple joints    Atrial fibrillation (Multi)    Cardiac arrest with successful resuscitation (Multi)    Chronic pain disorder    Chronic pain of left knee    Colonic diverticular abscess    Contact with and (suspected) exposure to covid-19    Coronavirus infection    Cough    Diplopia     Diverticulitis of large intestine without perforation or abscess without bleeding    Hypokalemia    Immunosuppressive-induced hyperlipidemia    Iron deficiency anemia    Muscle weakness    Perforated diverticulum of large intestine    Polymyalgia rheumatica (Multi)    Weight loss    Abnormal computerized axial tomography of chest        This is a very pleasant 63-year-old female following up in my office on history of SVT. Feels much better since we started her on metoprolol and she is on the higher dose beta-blocker. Less episodes of palpitations. She underwent monitor for 2 weeks that showed no atrial fibrillation primarily SVT, patient also had a stress test and 2D echo this year within normal limit. Patient also had coronary calcium score that showed low risk overall.Total calcium score was 14.  She did have some density seen on her chest CT she was seen by Dr. Frias with repeated CAT scan that showed stable disease. Patient returns to my office for follow-up.  Has been feeling overall okay.  Still having palpitations occasionally.  Denies having chest pain nausea vomiting diaphoresis.  At this point my recommendation is to continue current medications offered her again repeated monitor or EP ablation evaluation but patient refused she would like to continue current medications we will follow-up in 6 months    Tesfaye Corona MD

## 2024-06-03 ENCOUNTER — LAB REQUISITION (OUTPATIENT)
Dept: LAB | Facility: HOSPITAL | Age: 64
End: 2024-06-03
Payer: COMMERCIAL

## 2024-06-03 ENCOUNTER — HOSPITAL ENCOUNTER (OUTPATIENT)
Dept: RADIOLOGY | Facility: EXTERNAL LOCATION | Age: 64
Discharge: HOME | End: 2024-06-03

## 2024-06-03 DIAGNOSIS — M25.571 PAIN IN RIGHT ANKLE AND JOINTS OF RIGHT FOOT: ICD-10-CM

## 2024-06-03 DIAGNOSIS — L89.510: ICD-10-CM

## 2024-06-03 PROCEDURE — 87205 SMEAR GRAM STAIN: CPT

## 2024-06-03 PROCEDURE — 87070 CULTURE OTHR SPECIMN AEROBIC: CPT

## 2024-06-03 PROCEDURE — 87186 SC STD MICRODIL/AGAR DIL: CPT

## 2024-06-03 PROCEDURE — 87075 CULTR BACTERIA EXCEPT BLOOD: CPT

## 2024-06-10 LAB
BACTERIA SPEC CULT: ABNORMAL
BACTERIA SPEC CULT: ABNORMAL
GRAM STN SPEC: ABNORMAL
GRAM STN SPEC: ABNORMAL

## 2024-06-25 ENCOUNTER — APPOINTMENT (OUTPATIENT)
Dept: RHEUMATOLOGY | Facility: CLINIC | Age: 64
End: 2024-06-25
Payer: COMMERCIAL

## 2024-06-25 VITALS
DIASTOLIC BLOOD PRESSURE: 68 MMHG | BODY MASS INDEX: 17.42 KG/M2 | HEIGHT: 64 IN | HEART RATE: 99 BPM | WEIGHT: 102 LBS | SYSTOLIC BLOOD PRESSURE: 103 MMHG

## 2024-06-25 DIAGNOSIS — M06.9 RHEUMATOID ARTHRITIS, INVOLVING UNSPECIFIED SITE, UNSPECIFIED WHETHER RHEUMATOID FACTOR PRESENT (MULTI): Primary | ICD-10-CM

## 2024-06-25 DIAGNOSIS — Z79.899 ENCOUNTER FOR LONG-TERM (CURRENT) USE OF HIGH-RISK MEDICATION: ICD-10-CM

## 2024-06-25 PROCEDURE — 99213 OFFICE O/P EST LOW 20 MIN: CPT | Performed by: INTERNAL MEDICINE

## 2024-06-25 PROCEDURE — 3008F BODY MASS INDEX DOCD: CPT | Performed by: INTERNAL MEDICINE

## 2024-06-25 ASSESSMENT — ENCOUNTER SYMPTOMS
SHORTNESS OF BREATH: 0
ROS GI COMMENTS: COLOSTOMY
PALPITATIONS: 1
DIZZINESS: 1
ABDOMINAL PAIN: 0

## 2024-06-25 NOTE — PROGRESS NOTES
Subjective   Patient ID: Maggie Carrington is a 63 y.o. female who presents for Rheumatoid Arthritis (6 month follow up ).  HPI  Patient with history of rheumatoid arthritis with rheumatoid nodules.  Previous medications have included methotrexate, gold, leflunomide, Xeljanz.  Patient with history of diverticulosis with history of colostomy and cardiac arrest.  Has had frequent infections on medication such as Xeljanz      We last saw her in December and we had her continue on Orencia but she was having co-pay problems.  She is also having pain in her wrists, knees and was going to see orthopedics.    In January she passed out in the bathroom.  Her hips hurt the most.  She did have a tooth abscess and her heart started having increasing rate.  Her blood pressure was all over the place.  Had NSTEMI.  Had right ankle swelling in June  Review of Systems   Respiratory:  Negative for shortness of breath.    Cardiovascular:  Positive for palpitations. Negative for leg swelling.   Gastrointestinal:  Negative for abdominal pain.        Colostomy   Neurological:  Positive for dizziness.       Objective   Physical Exam  GEN: NAD A&O x3 appropriate affect examined in her chair.  Uses a wheeled walker.  HENT: no enlarged glands or thyroid  EYES: +conjunctival redness, eyelids normal  SKIN: No rashes seen on exposed skin  MSK:  Unable to extend her fingers has had chronic changes bilaterally and surgical procedures in her MCPs.  Has swelling in the left breast more than the right no swelling elbows or shoulders decreased muscle tone in the upper extremities has swelling in the knees more on the right than the left and pain with range of motion especially of the right hip.    Assessment/Plan        Rheumatoid arthritis with nodules.  Previously has been on methotrexate, gold, NSAIDs, leflunomide, Enbrel, Xeljanz.    -Currently on Orencia.  Does still have some swelling on exam.  Liver continue with her current regimen but discussed  we could try for other medications in the future.  Will order blood work and reviewed her record    We will have her come back again in 6 months or as needed.    Patient is asking for prescription for wheelchair.

## 2024-07-06 PROBLEM — K04.7 DENTAL ABSCESS: Status: RESOLVED | Noted: 2024-01-07 | Resolved: 2024-07-06

## 2024-07-06 PROBLEM — B34.2 CORONAVIRUS INFECTION: Status: RESOLVED | Noted: 2024-02-21 | Resolved: 2024-07-06

## 2024-07-06 PROBLEM — I47.10 SVT (SUPRAVENTRICULAR TACHYCARDIA) (CMS-HCC): Status: ACTIVE | Noted: 2024-02-21

## 2024-07-06 PROBLEM — R79.89 ELEVATED TROPONIN: Status: RESOLVED | Noted: 2024-01-06 | Resolved: 2024-07-06

## 2024-07-06 PROBLEM — I25.2 HISTORY OF NON-ST ELEVATION MYOCARDIAL INFARCTION (NSTEMI): Status: ACTIVE | Noted: 2024-07-06

## 2024-07-06 PROBLEM — J18.9 CAP (COMMUNITY ACQUIRED PNEUMONIA): Status: ACTIVE | Noted: 2024-07-06

## 2024-07-06 PROBLEM — E87.6 HYPOKALEMIA: Status: RESOLVED | Noted: 2024-02-21 | Resolved: 2024-07-06

## 2024-07-06 PROBLEM — R05.9 COUGH: Status: RESOLVED | Noted: 2024-02-21 | Resolved: 2024-07-06

## 2024-07-06 PROBLEM — K57.20 COLONIC DIVERTICULAR ABSCESS: Status: RESOLVED | Noted: 2024-02-21 | Resolved: 2024-07-06

## 2024-07-06 PROBLEM — R55 SYNCOPE AND COLLAPSE: Status: RESOLVED | Noted: 2024-01-07 | Resolved: 2024-07-06

## 2024-07-06 PROBLEM — J18.9 CAP (COMMUNITY ACQUIRED PNEUMONIA): Status: RESOLVED | Noted: 2024-07-06 | Resolved: 2024-07-06

## 2024-07-06 PROBLEM — K57.20 PERFORATED DIVERTICULUM OF LARGE INTESTINE: Status: RESOLVED | Noted: 2024-02-21 | Resolved: 2024-07-06

## 2024-07-06 PROBLEM — Z91.81 AT RISK FOR FALLING: Status: ACTIVE | Noted: 2024-07-06

## 2024-07-06 PROBLEM — K91.89 ANASTOMOTIC LEAK OF INTESTINE: Status: RESOLVED | Noted: 2024-02-21 | Resolved: 2024-07-06

## 2024-07-06 PROBLEM — K57.32 DIVERTICULITIS OF LARGE INTESTINE WITHOUT PERFORATION OR ABSCESS WITHOUT BLEEDING: Status: RESOLVED | Noted: 2024-02-21 | Resolved: 2024-07-06

## 2024-07-06 NOTE — PROGRESS NOTES
HPI:  pt here to establish  (last seen by Dr. Navarro in 2018) and for routine wellness exam w/o any issues.  Already has cbc, cmp, lipids ordered    GYN History:          Menopause postmenopausal         H/o STD No.          H/o abnormal PAP no         Contraception Nothing         Vasomotor symptoms {Menopause s/s:52814}.          LMP ***          Last Pap yrs ago          Calcium intake adequate. Vitamin D intake at 800-1000 IU/day..          Bone DensityNo         Last Mammogram yrs ago          H/o Gyn Surgery No         Sexual partner same     Breast Complaint:          c/o Due for breast screening.          Denies : concerns.     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      metoprolol tartrate (Lopressor) 50 mg tablet Take 1 tablet by mouth once daily. HOLD FOR BP LESS THEN 100 OR HR LESS THEN 60       No current facility-administered medications for this visit.     ALLERGIES:   Allergies   Allergen Reactions    Duloxetine GI Upset and Nausea And Vomiting     FAMILY HISTORY:   Family History   Problem Relation Name Age of Onset    Diabetes type II Mother moshe     Other (psoriatic  arthritis) Father      Diabetes Father      Cancer Father      Diabetes Brother      No Known Problems Brother       SOCIAL HISTORY:   Social History     Tobacco Use    Smoking status: Never     Passive exposure: Never    Smokeless tobacco: Never   Vaping Use    Vaping status: Never Used   Substance Use Topics    Alcohol use: Never    Drug use: Never       ROS:    CONSTITUTIONAL:          Negative for concerns, fever, chills.         HEENT:          no Difficulty swallowing.  no Hearing loss.  no Poor sense of smell.   No change in vision ; no headaches     CARDIOLOGY:          Chest pain none.  Palpitations none.  Shortness of breath none.         RESPIRATORY:          Negative for persistent cough , wheezing, trouble breathing.         GASTROENTEROLOGY:          Negative for abdominal pain, change in bowel habits.         ENDOCRINOLOGY:          Negative for fatigue, polydipsia, polyuria, weight loss, weight gain, cold intolerance, heat intolerance.         MUSCULOSKELETAL:          Negative for myalgias, joint pain.         DERMATOLOGY:          Negative for rash, bruising, moles.         NEUROLOGY:          Negative for weakness, headache, dizziness.         FEMALE REPRODUCTIVE:          See HPI for details.  no Breast symptoms.  no Abnormal vaginal discharge.  no Dysuria.  no Hot flashes.  no Skipping periods.  no Vaginal spotting.        VITAL SIGNS:  There were no vitals taken for this visit.    PE:{PHYSICAL EXAM WITH PROVIDER CHOICES:49232}                  Vulva: {vulva exam:30924}   Vagina: {vaginal exam:91949}   Cervix: {cervix exam:30669}   Uterus: {uterus exam:80606}   Adnexa: {adnexa:05816}          Diagnoses and all orders for this visit:  Well adult exam (Primary)  Well female exam with routine gynecological exam  Encounter for screening mammogram for malignant neoplasm of breast  Screening for cervical cancer  Screening for HPV (human papillomavirus)

## 2024-07-19 PROBLEM — Z90.49 HISTORY OF COLECTOMY: Status: ACTIVE | Noted: 2024-07-19

## 2024-07-19 NOTE — PROGRESS NOTES
HPI:  pt here to establish (last seen by Dr. Navarro in 2018)  for routine wellness exam w/o any issues.  Already has cbc, cmp, lipids ordered         GYN History:          Menopause postmenopausal         H/o STD No.          H/o abnormal PAP no         Contraception Nothing         Vasomotor symptoms none.          LMP yrs ago          Last Pap yrs ago          Calcium intake adequate. Vitamin D intake at 800-1000 IU/day..          Bone DensityNo         Last Mammogram yrs ago          H/o Gyn Surgery No         Sexual partner same     Breast Complaint:          c/o Due for breast screening.          Denies : concerns.   Pt does c.o of a cough for several months that occurs anytime of day or night.  If she coughs long enough she can cough up some blood.  No allergies, no asthma, no copd.  No h/o of gerd.  Recent ct chest in May shows rheumatoid nodules, otherwise negative.  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      aspirin-acetaminophen-caffeine (Excedrin Extra Strength) 250-250-65 mg tablet Take 2 tablets by mouth every 6 hours if needed.      metoprolol tartrate (Lopressor) 50 mg tablet Take 1 tablet by mouth once daily. HOLD FOR BP LESS THEN 100 OR HR LESS THEN 60      azithromycin (Zithromax Z-Avery) 250 mg tablet 2 tablets  Day 1 then 1 tablet Days 2-5 6 tablet 0    benzonatate (Tessalon) 200 mg capsule Take 1 capsule (200 mg) by mouth 3 times a day as needed for cough for up to 20 days. Do not crush or chew. 60 capsule 0     No current facility-administered medications for this visit.     ALLERGIES:   Allergies   Allergen Reactions    Duloxetine GI Upset and Nausea And Vomiting     FAMILY HISTORY:   Family History   Problem Relation Name Age of Onset    Diabetes type II Mother moshe     Diabetes Mother moshe     Other (psoriatic arthritis) Father Reece     Diabetes Father Reece     Cancer Father Reece     Diabetes Brother      No Known Problems Brother      Arthritis Mother's Sister Marialuisa     Arthritis Mother's Sister Kia     Arthritis Brother Don     Diabetes Mother's Brother Eriberto      SOCIAL HISTORY:   Social History     Tobacco Use    Smoking status: Never     Passive exposure: Never    Smokeless tobacco: Never   Vaping Use    Vaping status: Never Used   Substance Use Topics    Alcohol use: Never    Drug use: Never       ROS:     CONSTITUTIONAL:          Negative for concerns, fever, chills.         HEENT:          no Difficulty swallowing.  no Hearing loss.  no Poor sense of smell.   No change in vision ; no headaches     CARDIOLOGY:          Chest pain none.  Palpitations none.  Shortness of breath none.         RESPIRATORY:          Negative for persistent cough , wheezing, trouble breathing.         GASTROENTEROLOGY:          Negative for abdominal pain, change in bowel habits.         ENDOCRINOLOGY:          Negative for fatigue, polydipsia, polyuria, weight loss, weight gain,  "cold intolerance, heat intolerance.         MUSCULOSKELETAL:          Negative for myalgias, joint pain.         DERMATOLOGY:          Negative for rash, bruising, moles.         NEUROLOGY:          Negative for weakness, headache, dizziness.      FEMALE REPRODUCTIVE:          See HPI for details.  no Breast symptoms.  no Abnormal vaginal discharge.  no Dysuria.  no Hot flashes.  no Skipping periods.  no Vaginal spotting.        VITAL SIGNS:  /78   Pulse 84   Ht 1.626 m (5' 4\")   Wt 49.1 kg (108 lb 3.2 oz)   SpO2 99%   BMI 18.57 kg/m²     PE:    General Appearance: awake, alert, oriented, in no acute distress  Skin: there are no suspicious lesions or rashes of concern  Head/Face: NCAT  Eyes: No gross abnormalities., PERRL, and EOMI  Ears: canals and TMs NI  Mouth/Throat: Mucosa moist, no lesions; pharynx without erythema, edema or exudate.  Neck: neck- supple, no mass, non-tender  Lungs: Normal expansion.  Clear to auscultation.  No rales, rhonchi, or wheezing.  Heart: Heart sounds are normal.  Regular rate and rhythm without murmur, gallop or rub.  Extremities: Extremities warm to touch, pink, with no edema.  Neurologic: Alert and oriented x 3, gait normal., reflexes normal and symmetric, strength and  sensation grossly normal              Abdomen: soft, non-tender, without masses or organomegaly   Vulva: normal   Vagina: normal mucosa   Cervix: no bleeding following Pap, no cervical motion tenderness, and no lesions   Uterus: normal size   Adnexa: normal adnexa        Maggie \"Kerri\" was seen today for establish care and gynecologic exam.  Diagnoses and all orders for this visit:  Well adult exam (Primary)  Well female exam with routine gynecological exam  Screening for cervical cancer  -     THINPREP PAP TEST  Screening for HPV (human papillomavirus)  -     THINPREP PAP TEST  Encounter for screening mammogram for malignant neoplasm of breast  Encounter for osteoporosis screening in asymptomatic " postmenopausal patient  -     XR DEXA bone density; Future  History of colectomy  Need for vaccination  -     Tdap vaccine, age 7 years and older  (BOOSTRIX)  -     Pneumococcal conjugate vaccine, 20-valent (PREVNAR 20)  Chronic cough  -     benzonatate (Tessalon) 200 mg capsule; Take 1 capsule (200 mg) by mouth 3 times a day as needed for cough for up to 20 days. Do not crush or chew.  -     azithromycin (Zithromax Z-Avery) 250 mg tablet; 2 tablets  Day 1 then 1 tablet Days 2-5  If cough isn't helped with meds will do cxr and try gerd med

## 2024-08-01 ENCOUNTER — OFFICE VISIT (OUTPATIENT)
Dept: PRIMARY CARE | Facility: CLINIC | Age: 64
End: 2024-08-01
Payer: COMMERCIAL

## 2024-08-01 VITALS
WEIGHT: 108.2 LBS | OXYGEN SATURATION: 99 % | BODY MASS INDEX: 18.47 KG/M2 | DIASTOLIC BLOOD PRESSURE: 78 MMHG | HEIGHT: 64 IN | HEART RATE: 84 BPM | SYSTOLIC BLOOD PRESSURE: 128 MMHG

## 2024-08-01 DIAGNOSIS — Z23 NEED FOR VACCINATION: ICD-10-CM

## 2024-08-01 DIAGNOSIS — Z90.49 HISTORY OF COLECTOMY: ICD-10-CM

## 2024-08-01 DIAGNOSIS — Z78.0 ENCOUNTER FOR OSTEOPOROSIS SCREENING IN ASYMPTOMATIC POSTMENOPAUSAL PATIENT: ICD-10-CM

## 2024-08-01 DIAGNOSIS — Z13.820 ENCOUNTER FOR OSTEOPOROSIS SCREENING IN ASYMPTOMATIC POSTMENOPAUSAL PATIENT: ICD-10-CM

## 2024-08-01 DIAGNOSIS — R05.3 CHRONIC COUGH: ICD-10-CM

## 2024-08-01 DIAGNOSIS — Z12.31 ENCOUNTER FOR SCREENING MAMMOGRAM FOR MALIGNANT NEOPLASM OF BREAST: ICD-10-CM

## 2024-08-01 DIAGNOSIS — Z12.4 SCREENING FOR CERVICAL CANCER: ICD-10-CM

## 2024-08-01 DIAGNOSIS — Z00.00 WELL ADULT EXAM: Primary | ICD-10-CM

## 2024-08-01 DIAGNOSIS — Z01.419 WELL FEMALE EXAM WITH ROUTINE GYNECOLOGICAL EXAM: ICD-10-CM

## 2024-08-01 DIAGNOSIS — Z11.51 SCREENING FOR HPV (HUMAN PAPILLOMAVIRUS): ICD-10-CM

## 2024-08-01 PROCEDURE — 1036F TOBACCO NON-USER: CPT | Performed by: FAMILY MEDICINE

## 2024-08-01 PROCEDURE — 3008F BODY MASS INDEX DOCD: CPT | Performed by: FAMILY MEDICINE

## 2024-08-01 PROCEDURE — 87624 HPV HI-RISK TYP POOLED RSLT: CPT | Performed by: FAMILY MEDICINE

## 2024-08-01 PROCEDURE — 90471 IMMUNIZATION ADMIN: CPT | Performed by: FAMILY MEDICINE

## 2024-08-01 PROCEDURE — 90715 TDAP VACCINE 7 YRS/> IM: CPT | Performed by: FAMILY MEDICINE

## 2024-08-01 PROCEDURE — 99386 PREV VISIT NEW AGE 40-64: CPT | Performed by: FAMILY MEDICINE

## 2024-08-01 PROCEDURE — 90472 IMMUNIZATION ADMIN EACH ADD: CPT | Performed by: FAMILY MEDICINE

## 2024-08-01 PROCEDURE — 99203 OFFICE O/P NEW LOW 30 MIN: CPT | Performed by: FAMILY MEDICINE

## 2024-08-01 PROCEDURE — 90677 PCV20 VACCINE IM: CPT | Performed by: FAMILY MEDICINE

## 2024-08-01 RX ORDER — ABATACEPT 125 MG/ML
125 INJECTION, SOLUTION SUBCUTANEOUS
COMMUNITY

## 2024-08-01 RX ORDER — BENZONATATE 200 MG/1
200 CAPSULE ORAL 3 TIMES DAILY PRN
Qty: 60 CAPSULE | Refills: 0 | Status: SHIPPED | OUTPATIENT
Start: 2024-08-01 | End: 2024-08-21

## 2024-08-01 RX ORDER — AZITHROMYCIN 250 MG/1
TABLET, FILM COATED ORAL
Qty: 6 TABLET | Refills: 0 | Status: SHIPPED | OUTPATIENT
Start: 2024-08-01

## 2024-08-01 ASSESSMENT — PATIENT HEALTH QUESTIONNAIRE - PHQ9
SUM OF ALL RESPONSES TO PHQ9 QUESTIONS 1 AND 2: 0
2. FEELING DOWN, DEPRESSED OR HOPELESS: NOT AT ALL
1. LITTLE INTEREST OR PLEASURE IN DOING THINGS: NOT AT ALL

## 2024-08-01 ASSESSMENT — PAIN SCALES - GENERAL: PAINLEVEL: 6

## 2024-08-09 ENCOUNTER — HOSPITAL ENCOUNTER (OUTPATIENT)
Dept: RADIOLOGY | Facility: HOSPITAL | Age: 64
Discharge: HOME | End: 2024-08-09
Payer: COMMERCIAL

## 2024-08-09 DIAGNOSIS — Z13.820 ENCOUNTER FOR OSTEOPOROSIS SCREENING IN ASYMPTOMATIC POSTMENOPAUSAL PATIENT: ICD-10-CM

## 2024-08-09 DIAGNOSIS — Z78.0 ENCOUNTER FOR OSTEOPOROSIS SCREENING IN ASYMPTOMATIC POSTMENOPAUSAL PATIENT: ICD-10-CM

## 2024-08-09 PROCEDURE — 77080 DXA BONE DENSITY AXIAL: CPT

## 2024-08-09 ASSESSMENT — LIFESTYLE VARIABLES
3_OR_MORE_DRINKS_PER_DAY: N
CURRENT_SMOKER: N

## 2024-08-12 ENCOUNTER — APPOINTMENT (OUTPATIENT)
Dept: PRIMARY CARE | Facility: CLINIC | Age: 64
End: 2024-08-12
Payer: COMMERCIAL

## 2024-08-12 DIAGNOSIS — Z11.51 SCREENING FOR HPV (HUMAN PAPILLOMAVIRUS): ICD-10-CM

## 2024-08-12 DIAGNOSIS — Z01.419 WELL FEMALE EXAM WITH ROUTINE GYNECOLOGICAL EXAM: ICD-10-CM

## 2024-08-12 DIAGNOSIS — Z00.00 WELL ADULT EXAM: Primary | ICD-10-CM

## 2024-08-12 DIAGNOSIS — Z12.31 ENCOUNTER FOR SCREENING MAMMOGRAM FOR MALIGNANT NEOPLASM OF BREAST: ICD-10-CM

## 2024-08-12 DIAGNOSIS — Z12.4 SCREENING FOR CERVICAL CANCER: ICD-10-CM

## 2024-08-13 DIAGNOSIS — M06.9 RHEUMATOID ARTHRITIS, INVOLVING UNSPECIFIED SITE, UNSPECIFIED WHETHER RHEUMATOID FACTOR PRESENT (MULTI): ICD-10-CM

## 2024-08-13 RX ORDER — ABATACEPT 125 MG/ML
125 INJECTION, SOLUTION SUBCUTANEOUS
Qty: 4 ML | Refills: 5 | Status: SHIPPED | OUTPATIENT
Start: 2024-08-18 | End: 2025-02-14

## 2024-11-12 ENCOUNTER — APPOINTMENT (OUTPATIENT)
Dept: CARDIOLOGY | Facility: CLINIC | Age: 64
End: 2024-11-12
Payer: COMMERCIAL

## 2024-12-17 ENCOUNTER — LAB (OUTPATIENT)
Dept: LAB | Facility: LAB | Age: 64
End: 2024-12-17
Payer: COMMERCIAL

## 2024-12-17 ENCOUNTER — APPOINTMENT (OUTPATIENT)
Dept: RHEUMATOLOGY | Facility: CLINIC | Age: 64
End: 2024-12-17
Payer: COMMERCIAL

## 2024-12-17 VITALS
WEIGHT: 108 LBS | BODY MASS INDEX: 18.54 KG/M2 | SYSTOLIC BLOOD PRESSURE: 110 MMHG | DIASTOLIC BLOOD PRESSURE: 74 MMHG | OXYGEN SATURATION: 99 % | HEART RATE: 215 BPM

## 2024-12-17 DIAGNOSIS — Z79.899 ENCOUNTER FOR LONG-TERM (CURRENT) USE OF HIGH-RISK MEDICATION: Primary | ICD-10-CM

## 2024-12-17 DIAGNOSIS — M06.9 RHEUMATOID ARTHRITIS, INVOLVING UNSPECIFIED SITE, UNSPECIFIED WHETHER RHEUMATOID FACTOR PRESENT (MULTI): ICD-10-CM

## 2024-12-17 DIAGNOSIS — Z79.899 ENCOUNTER FOR LONG-TERM (CURRENT) USE OF HIGH-RISK MEDICATION: ICD-10-CM

## 2024-12-17 LAB
ALBUMIN SERPL BCP-MCNC: 3.7 G/DL (ref 3.4–5)
ALP SERPL-CCNC: 57 U/L (ref 33–136)
ALT SERPL W P-5'-P-CCNC: 12 U/L (ref 7–45)
ANION GAP SERPL CALC-SCNC: 20 MMOL/L (ref 10–20)
AST SERPL W P-5'-P-CCNC: 24 U/L (ref 9–39)
BILIRUB SERPL-MCNC: 0.3 MG/DL (ref 0–1.2)
BUN SERPL-MCNC: 15 MG/DL (ref 6–23)
CALCIUM SERPL-MCNC: 9.4 MG/DL (ref 8.6–10.3)
CHLORIDE SERPL-SCNC: 102 MMOL/L (ref 98–107)
CO2 SERPL-SCNC: 22 MMOL/L (ref 21–32)
CREAT SERPL-MCNC: 0.7 MG/DL (ref 0.5–1.05)
CRP SERPL-MCNC: 0.97 MG/DL
EGFRCR SERPLBLD CKD-EPI 2021: >90 ML/MIN/1.73M*2
ERYTHROCYTE [DISTWIDTH] IN BLOOD BY AUTOMATED COUNT: 15.2 % (ref 11.5–14.5)
ERYTHROCYTE [SEDIMENTATION RATE] IN BLOOD BY WESTERGREN METHOD: 54 MM/H (ref 0–30)
GLUCOSE SERPL-MCNC: 85 MG/DL (ref 74–99)
HCT VFR BLD AUTO: 38.1 % (ref 36–46)
HGB BLD-MCNC: 11.4 G/DL (ref 12–16)
MCH RBC QN AUTO: 24.6 PG (ref 26–34)
MCHC RBC AUTO-ENTMCNC: 29.9 G/DL (ref 32–36)
MCV RBC AUTO: 82 FL (ref 80–100)
NRBC BLD-RTO: 0 /100 WBCS (ref 0–0)
PLATELET # BLD AUTO: 205 X10*3/UL (ref 150–450)
POTASSIUM SERPL-SCNC: 4.2 MMOL/L (ref 3.5–5.3)
PROT SERPL-MCNC: 7.2 G/DL (ref 6.4–8.2)
RBC # BLD AUTO: 4.64 X10*6/UL (ref 4–5.2)
SODIUM SERPL-SCNC: 140 MMOL/L (ref 136–145)
WBC # BLD AUTO: 4.5 X10*3/UL (ref 4.4–11.3)

## 2024-12-17 PROCEDURE — G2211 COMPLEX E/M VISIT ADD ON: HCPCS | Performed by: INTERNAL MEDICINE

## 2024-12-17 PROCEDURE — 99213 OFFICE O/P EST LOW 20 MIN: CPT | Performed by: INTERNAL MEDICINE

## 2024-12-17 PROCEDURE — 86481 TB AG RESPONSE T-CELL SUSP: CPT

## 2024-12-17 PROCEDURE — 85027 COMPLETE CBC AUTOMATED: CPT

## 2024-12-17 PROCEDURE — 86140 C-REACTIVE PROTEIN: CPT

## 2024-12-17 PROCEDURE — 85652 RBC SED RATE AUTOMATED: CPT

## 2024-12-17 PROCEDURE — 36415 COLL VENOUS BLD VENIPUNCTURE: CPT

## 2024-12-17 PROCEDURE — 80053 COMPREHEN METABOLIC PANEL: CPT

## 2024-12-17 ASSESSMENT — ENCOUNTER SYMPTOMS
LOSS OF SENSATION IN FEET: 0
DEPRESSION: 0
PALPITATIONS: 1
SHORTNESS OF BREATH: 0
ROS GI COMMENTS: COLOSTOMY
OCCASIONAL FEELINGS OF UNSTEADINESS: 0
DIZZINESS: 1
ABDOMINAL PAIN: 0

## 2024-12-17 NOTE — PROGRESS NOTES
"Subjective   Patient ID: Maggie Carrington \"Kerri\" is a 64 y.o. female who presents for Follow-up.  HPI  Patient with history of rheumatoid arthritis with rheumatoid nodules.  Previous medications have included methotrexate, gold, leflunomide, Xeljanz.  Patient with history of diverticulosis with history of colostomy and cardiac arrest.  Has had frequent infections on medication such as Xeljanz    Patient was last seen in June and at that time we had her continue with Orencia.  She is accompanied by her  today.    When she was checking in she did have some increase in heart rate and does have a history of atrial fibrillation.  Her episodes are getting more frequent.  She just got Cobra a few weeks ago.  She had been off of Orencia at the end of August and had a bottle of Xeljanz so she took that.  She has bone-on-bone arthritis in her knees and hips.  She had to give up her part-time job in August.  Her  also lost his job and insurance as well.  Her wrist will hurt if she leans on her walker.      Review of Systems   Respiratory:  Negative for shortness of breath.    Cardiovascular:  Positive for palpitations. Negative for leg swelling.   Gastrointestinal:  Negative for abdominal pain.        Colostomy   Neurological:  Positive for dizziness.       Objective   Physical Exam  GEN: NAD A&O x3 appropriate affect examined in wheelchair   HENT: no enlarged glands or thyroid  EYES: +conjunctival redness, eyelids normal  SKIN: No rashes seen on exposed skin  Her heart rate is a regular but no longer tacky  MSK:  Unable to extend her fingers has had chronic changes bilaterally and surgical procedures in her MCPs.  Has swelling in the left breast more than the right no swelling elbows or shoulders decreased muscle tone in the upper extremities has swelling in the knees more on the right than the left and pain with range of motion especially of the right hip.    Assessment/Plan        Rheumatoid arthritis with " nodules.  Previously has been on methotrexate, gold, NSAIDs, leflunomide, Enbrel, Xeljanz.    -Has had difficulty with insurance as her  no longer had his job and she was on Cobra.  Had been off of Orencia and her insurance may be changing again.  -Patient given 4 samples of Orencia Lot number [id number] expiration date 09/20/2025  -She will call us back in January with her new insurance card and we will put a new prescription for her    Atrial fibrillation she has an appointment with her cardiologist in 2 weeks which I strongly suggest want her to keep.  Patient understands    We will have her come back again in 6 months or as needed.

## 2024-12-19 LAB
NIL(NEG) CONTROL SPOT COUNT: NORMAL
PANEL A SPOT COUNT: 1
PANEL B SPOT COUNT: 0
POS CONTROL SPOT COUNT: NORMAL
T-SPOT. TB INTERPRETATION: NEGATIVE

## 2024-12-31 ENCOUNTER — APPOINTMENT (OUTPATIENT)
Dept: CARDIOLOGY | Facility: CLINIC | Age: 64
End: 2024-12-31
Payer: COMMERCIAL

## 2025-01-07 ENCOUNTER — TELEPHONE (OUTPATIENT)
Dept: RHEUMATOLOGY | Facility: CLINIC | Age: 65
End: 2025-01-07
Payer: COMMERCIAL

## 2025-01-14 ENCOUNTER — APPOINTMENT (OUTPATIENT)
Dept: CARDIOLOGY | Facility: CLINIC | Age: 65
End: 2025-01-14
Payer: COMMERCIAL

## 2025-01-14 VITALS
SYSTOLIC BLOOD PRESSURE: 118 MMHG | DIASTOLIC BLOOD PRESSURE: 60 MMHG | HEART RATE: 85 BPM | BODY MASS INDEX: 18.71 KG/M2 | WEIGHT: 109 LBS | OXYGEN SATURATION: 97 %

## 2025-01-14 DIAGNOSIS — Z86.74 HISTORY OF CARDIAC ARREST: ICD-10-CM

## 2025-01-14 DIAGNOSIS — E78.5 IMMUNOSUPPRESSIVE-INDUCED HYPERLIPIDEMIA: ICD-10-CM

## 2025-01-14 DIAGNOSIS — T45.1X5A IMMUNOSUPPRESSIVE-INDUCED HYPERLIPIDEMIA: ICD-10-CM

## 2025-01-14 DIAGNOSIS — R79.89 ELEVATED TROPONIN: ICD-10-CM

## 2025-01-14 DIAGNOSIS — I25.2 HISTORY OF NON-ST ELEVATION MYOCARDIAL INFARCTION (NSTEMI): ICD-10-CM

## 2025-01-14 DIAGNOSIS — I47.10 SVT (SUPRAVENTRICULAR TACHYCARDIA) (CMS-HCC): Primary | ICD-10-CM

## 2025-01-14 DIAGNOSIS — Z91.81 AT RISK FOR FALLING: ICD-10-CM

## 2025-01-14 PROCEDURE — 1036F TOBACCO NON-USER: CPT | Performed by: INTERNAL MEDICINE

## 2025-01-14 PROCEDURE — 99214 OFFICE O/P EST MOD 30 MIN: CPT | Performed by: INTERNAL MEDICINE

## 2025-01-14 RX ORDER — METOPROLOL SUCCINATE 50 MG/1
50 TABLET, EXTENDED RELEASE ORAL DAILY
Qty: 90 TABLET | Refills: 3 | Status: SHIPPED | OUTPATIENT
Start: 2025-01-14 | End: 2026-01-14

## 2025-01-14 ASSESSMENT — ENCOUNTER SYMPTOMS
DEPRESSION: 0
LOSS OF SENSATION IN FEET: 0
OCCASIONAL FEELINGS OF UNSTEADINESS: 0

## 2025-01-14 ASSESSMENT — PAIN SCALES - GENERAL: PAINLEVEL_OUTOF10: 10-WORST PAIN EVER

## 2025-01-14 NOTE — PROGRESS NOTES
History of present illness:  This is a very pleasant 63-year-old female following up in my office on history of SVT. Feels much better since we started her on metoprolol and she is on the higher dose beta-blocker. Less episodes of palpitations. She underwent monitor for 2 weeks that showed no atrial fibrillation primarily SVT, patient also had a stress test and 2D echo this year within normal limit. Patient also had coronary calcium score that showed low risk overall.Total calcium score was 14.  She did have some density seen on her chest CT she was seen by Dr. Frias with repeated CAT scan that showed stable disease. Patient returns to my office for follow-up.  Has been feeling overall okay.  Still having palpitations occasionally.  Denies having chest pain nausea vomiting diaphoresis.     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    Intestinal obstruction due to adhesions (Multi)     s/p colectomy    Muscle weakness (generalized)     Myocardial infarction (Multi) 8/2022, 1/2024    Osteoarthritis     Osteoporosis     Pain in unspecified joint     Multiple joint pain    Rheumatoid arthritis     Lumapas    Syncope     Varicella 1968    Visual impairment        Past Surgical History:   Procedure Laterality Date    COLECTOMY  2022    terminal ileum/cecum  [facility]    CT GUIDED PERCUTANEOUS PERITONEAL OR RETROPERITONEAL FLUID COLLECTION DRAINAGE  01/31/2022    CT GUIDED PERCUTANEOUS PERITONEAL OR RETROPERITONEAL FLUID  COL[facility]CTION [facility]    CT GUIDED PERCUTANEOUS PERITONEAL OR RETROPERITONEAL FLUID COLLECTION DRAINAGE  08/10/2022    CT GUIDED PERCUTANEOUS PERITONEAL OR RETROPERITONEAL FLUID COLLECTION DRAINAGE [facility]    FOOT SURGERY Left 1995    HAND SURGERY Right 1987    JOINT REPLACEMENT  1988?    TONSILLECTOMY      WISDOM TOOTH EXTRACTION  1/2024       Allergies   Allergen Reactions    Duloxetine GI Upset and Nausea And Vomiting        reports that she has never smoked. She has never been exposed to tobacco smoke. She has never used smokeless tobacco. She reports that she does not drink alcohol and does not use drugs.    Family History   Problem Relation Name Age of Onset    Diabetes type II Mother moshe     Diabetes Mother moshe     Other (psoriatic arthritis) Father Reece     Diabetes Father Reece     Cancer Father Reece     Diabetes Brother      No Known Problems Brother      Arthritis Mother's Sister Marialuisa     Arthritis Mother's Sister Kia     Arthritis Brother Don     Diabetes Mother's Brother Eriberto        Patient's Medications   New Prescriptions    No medications on file   Previous Medications    ASPIRIN-ACETAMINOPHEN-CAFFEINE (EXCEDRIN EXTRA STRENGTH) 250-250-65 MG TABLET    Take 2 tablets by mouth every 6 hours if needed.    AZITHROMYCIN (ZITHROMAX Z-SINDI) 250 MG TABLET    2 tablets  Day 1 then 1 tablet Days 2-5    METOPROLOL TARTRATE (LOPRESSOR) 50 MG TABLET    Take 1 tablet by mouth once daily. HOLD FOR BP LESS THEN 100 OR HR LESS THEN 60    ORENCIA CLICKJECT 125 MG/ML AUTO-INJECTOR AUTO-INJECTION    INJECT 1 ML (125 MG) UNDER THE SKIN 1 (ONE) TIME PER WEEK.   Modified Medications    No medications on file   Discontinued Medications    No medications on file       Objective   Physical Exam  General: Patient in no acute distress   HEENT: Atraumatic normocephalic.  Neck: Supple, jugular venous pressure within normal limit.  No bruits  Lungs: Clear to auscultation  bilaterally  Cardiovascular: Regular rate and rhythm, normal heart sounds, no murmurs rubs or gallops  Abdomen: Soft nontender nondistended.  Normal bowel sounds.  Extremities: Warm to touch, no edema.    Lab Review   Lab on 12/17/2024   Component Date Value    WBC 12/17/2024 4.5     nRBC 12/17/2024 0.0     RBC 12/17/2024 4.64     Hemoglobin 12/17/2024 11.4 (L)     Hematocrit 12/17/2024 38.1     MCV 12/17/2024 82     MCH 12/17/2024 24.6 (L)     MCHC 12/17/2024 29.9 (L)     RDW 12/17/2024 15.2 (H)     Platelets 12/17/2024 205     Glucose 12/17/2024 85     Sodium 12/17/2024 140     Potassium 12/17/2024 4.2     Chloride 12/17/2024 102     Bicarbonate 12/17/2024 22     Anion Gap 12/17/2024 20     Urea Nitrogen 12/17/2024 15     Creatinine 12/17/2024 0.70     eGFR 12/17/2024 >90     Calcium 12/17/2024 9.4     Albumin 12/17/2024 3.7     Alkaline Phosphatase 12/17/2024 57     Total Protein 12/17/2024 7.2     AST 12/17/2024 24     Bilirubin, Total 12/17/2024 0.3     ALT 12/17/2024 12     C-Reactive Protein 12/17/2024 0.97     Sedimentation Rate 12/17/2024 54 (H)     T-SPOT. TB Interpretation 12/17/2024 Negative     Panel A Spot Count 12/17/2024 1     Panel B Spot Count 12/17/2024 0     NIL(NEG) Control Spot Co* 12/17/2024 Passed     POS Control Spot Count 12/17/2024 Passed         Assessment/Plan   Patient Active Problem List   Diagnosis    Rheumatoid arthritis involving multiple sites with positive rheumatoid factor (Multi)    Diverticulosis    History of cardiac arrest    Anxiety    Arthralgia of ankle    Arthralgia of multiple joints    SVT (supraventricular tachycardia) (CMS-HCC)    Chronic pain disorder    Chronic pain of left knee    Diplopia    Immunosuppressive-induced hyperlipidemia    Iron deficiency anemia    Muscle weakness    Polymyalgia rheumatica (Multi)    Weight loss    Abnormal computerized axial tomography of chest    History of non-ST elevation myocardial infarction (NSTEMI)    At risk for falling     History of colectomy    Osteoporosis      This is a very pleasant 63-year-old female following up in my office on history of SVT. Feels much better since we started her on metoprolol and she is on the higher dose beta-blocker. Less episodes of palpitations. She underwent monitor for 2 weeks that showed no atrial fibrillation primarily SVT, patient also had a stress test and 2D echo this year within normal limit. Patient also had coronary calcium score that showed low risk overall.Total calcium score was 14.  She did have some density seen on her chest CT she was seen by Dr. Frias with repeated CAT scan that showed stable disease. Patient returns to my office for follow-up.  Has been feeling overall okay.  Still having palpitations occasionally.  Denies having chest pain nausea vomiting diaphoresis.  At this point my recommendation is to continue current medications offered her again repeated monitor or EP ablation evaluation but patient refused she would like to continue current medications we will follow-up in 6 months     Tesfaye Corona MD

## 2025-02-19 DIAGNOSIS — M06.9 RHEUMATOID ARTHRITIS, INVOLVING UNSPECIFIED SITE, UNSPECIFIED WHETHER RHEUMATOID FACTOR PRESENT (MULTI): ICD-10-CM

## 2025-02-19 RX ORDER — ABATACEPT 125 MG/ML
125 INJECTION, SOLUTION SUBCUTANEOUS
Qty: 4 ML | Refills: 5 | Status: SHIPPED | OUTPATIENT
Start: 2025-02-23 | End: 2025-08-22

## 2025-03-06 DIAGNOSIS — I47.10 SVT (SUPRAVENTRICULAR TACHYCARDIA) (CMS-HCC): ICD-10-CM

## 2025-03-06 RX ORDER — METOPROLOL SUCCINATE 50 MG/1
50 TABLET, EXTENDED RELEASE ORAL DAILY
Qty: 90 TABLET | Refills: 3 | Status: SHIPPED | OUTPATIENT
Start: 2025-03-06 | End: 2026-03-06

## 2025-06-17 ENCOUNTER — APPOINTMENT (OUTPATIENT)
Dept: RHEUMATOLOGY | Facility: CLINIC | Age: 65
End: 2025-06-17
Payer: COMMERCIAL

## 2025-06-17 VITALS
OXYGEN SATURATION: 98 % | HEART RATE: 75 BPM | SYSTOLIC BLOOD PRESSURE: 95 MMHG | DIASTOLIC BLOOD PRESSURE: 60 MMHG | WEIGHT: 109 LBS | BODY MASS INDEX: 18.71 KG/M2

## 2025-06-17 DIAGNOSIS — M06.9 RHEUMATOID ARTHRITIS, INVOLVING UNSPECIFIED SITE, UNSPECIFIED WHETHER RHEUMATOID FACTOR PRESENT (MULTI): Primary | ICD-10-CM

## 2025-06-17 DIAGNOSIS — Z79.899 ENCOUNTER FOR LONG-TERM (CURRENT) USE OF HIGH-RISK MEDICATION: ICD-10-CM

## 2025-06-17 PROCEDURE — 1036F TOBACCO NON-USER: CPT | Performed by: INTERNAL MEDICINE

## 2025-06-17 PROCEDURE — 99213 OFFICE O/P EST LOW 20 MIN: CPT | Performed by: INTERNAL MEDICINE

## 2025-06-17 ASSESSMENT — ENCOUNTER SYMPTOMS
ROS GI COMMENTS: COLOSTOMY
DEPRESSION: 0
PALPITATIONS: 1
ABDOMINAL PAIN: 0
LOSS OF SENSATION IN FEET: 0
OCCASIONAL FEELINGS OF UNSTEADINESS: 0
SHORTNESS OF BREATH: 0
DIZZINESS: 1

## 2025-06-17 ASSESSMENT — PATIENT HEALTH QUESTIONNAIRE - PHQ9
SUM OF ALL RESPONSES TO PHQ9 QUESTIONS 1 AND 2: 0
1. LITTLE INTEREST OR PLEASURE IN DOING THINGS: NOT AT ALL
2. FEELING DOWN, DEPRESSED OR HOPELESS: NOT AT ALL

## 2025-06-17 ASSESSMENT — PAIN SCALES - GENERAL: PAINLEVEL_OUTOF10: 8

## 2025-06-17 NOTE — PROGRESS NOTES
"Subjective   Patient ID: Maggie Carrington \"Kerri\" is a 64 y.o. female who presents for Follow-up.  HPI  Patient with history of rheumatoid arthritis with rheumatoid nodules.  Previous medications have included methotrexate, gold, leflunomide, Xeljanz.  Patient with history of diverticulosis with history of colostomy and cardiac arrest.  Has had frequent infections on medication such as Xeljanz    Patient was last seen in December and at that time She has some difficulty getting Orencia as her  no longer has his job and they were on Cobra.  We have given her samples of Orencia.  She was able to get onto Orencia regularly and she does feel that it has helped her symptoms.  She is able to walk in her house with a walker.  Most of her pain is in her on the side.  Her legs also have discomfort as well.  Denies any falls.  She does have a chronic cough.    Review of Systems   Respiratory:  Negative for shortness of breath.    Cardiovascular:  Positive for palpitations. Negative for leg swelling.   Gastrointestinal:  Negative for abdominal pain.        Colostomy   Neurological:  Positive for dizziness.       Objective   Physical Exam  GEN: NAD A&O x3 appropriate affect examined in wheelchair   HENT: no enlarged glands or thyroid  EYES: +conjunctival redness, eyelids normal  SKIN: No rashes seen on exposed skin  Her heart rate is a regular but no longer tacky  MSK:  Unable to extend her fingers has had chronic changes bilaterally and surgical procedures in her MCPs.  No swelling in the bilateral wrists  No swelling in the elbows  Tenderness in the lower legs and fullness  Some tenderness  Assessment/Plan        Rheumatoid arthritis with nodules.  Previously has been on methotrexate, gold, NSAIDs, leflunomide, Enbrel, Xeljanz.    -.  Currently on Orencia and has had decrease in inflammation.  Will get blood work to monitor drug toxicity and disease activity.  She will be going onto Medicare later this fall.     We will " have her come back again in 6 months or as needed.

## 2025-06-18 LAB
ALBUMIN SERPL-MCNC: 4 G/DL (ref 3.6–5.1)
ALP SERPL-CCNC: 53 U/L (ref 37–153)
ALT SERPL-CCNC: 7 U/L (ref 6–29)
ANION GAP SERPL CALCULATED.4IONS-SCNC: 10 MMOL/L (CALC) (ref 7–17)
AST SERPL-CCNC: 21 U/L (ref 10–35)
BILIRUB SERPL-MCNC: 0.4 MG/DL (ref 0.2–1.2)
BUN SERPL-MCNC: 18 MG/DL (ref 7–25)
CALCIUM SERPL-MCNC: 9.3 MG/DL (ref 8.6–10.4)
CHLORIDE SERPL-SCNC: 105 MMOL/L (ref 98–110)
CO2 SERPL-SCNC: 22 MMOL/L (ref 20–32)
CREAT SERPL-MCNC: 0.6 MG/DL (ref 0.5–1.05)
CRP SERPL-MCNC: 11.2 MG/L
EGFRCR SERPLBLD CKD-EPI 2021: 100 ML/MIN/1.73M2
ERYTHROCYTE [DISTWIDTH] IN BLOOD BY AUTOMATED COUNT: 14.4 % (ref 11–15)
ERYTHROCYTE [SEDIMENTATION RATE] IN BLOOD BY WESTERGREN METHOD: 31 MM/H
GLUCOSE SERPL-MCNC: 97 MG/DL (ref 65–139)
HCT VFR BLD AUTO: 36.4 % (ref 35–45)
HGB BLD-MCNC: 10.9 G/DL (ref 11.7–15.5)
MCH RBC QN AUTO: 24.3 PG (ref 27–33)
MCHC RBC AUTO-ENTMCNC: 29.9 G/DL (ref 32–36)
MCV RBC AUTO: 81.3 FL (ref 80–100)
PLATELET # BLD AUTO: 262 THOUSAND/UL (ref 140–400)
PMV BLD REES-ECKER: 11.1 FL (ref 7.5–12.5)
POTASSIUM SERPL-SCNC: 4.2 MMOL/L (ref 3.5–5.3)
PROT SERPL-MCNC: 7.2 G/DL (ref 6.1–8.1)
RBC # BLD AUTO: 4.48 MILLION/UL (ref 3.8–5.1)
SODIUM SERPL-SCNC: 137 MMOL/L (ref 135–146)
WBC # BLD AUTO: 5.1 THOUSAND/UL (ref 3.8–10.8)

## 2025-07-15 ENCOUNTER — APPOINTMENT (OUTPATIENT)
Dept: CARDIOLOGY | Facility: CLINIC | Age: 65
End: 2025-07-15
Payer: COMMERCIAL

## 2025-07-15 VITALS
RESPIRATION RATE: 16 BRPM | HEART RATE: 66 BPM | BODY MASS INDEX: 17.51 KG/M2 | DIASTOLIC BLOOD PRESSURE: 48 MMHG | WEIGHT: 102 LBS | OXYGEN SATURATION: 95 % | SYSTOLIC BLOOD PRESSURE: 96 MMHG

## 2025-07-15 DIAGNOSIS — T45.1X5A IMMUNOSUPPRESSIVE-INDUCED HYPERLIPIDEMIA: ICD-10-CM

## 2025-07-15 DIAGNOSIS — I25.2 HISTORY OF NON-ST ELEVATION MYOCARDIAL INFARCTION (NSTEMI): ICD-10-CM

## 2025-07-15 DIAGNOSIS — I47.10 SVT (SUPRAVENTRICULAR TACHYCARDIA): ICD-10-CM

## 2025-07-15 DIAGNOSIS — R07.2 PRECORDIAL PAIN: Primary | ICD-10-CM

## 2025-07-15 DIAGNOSIS — R05.3 CHRONIC COUGH: ICD-10-CM

## 2025-07-15 DIAGNOSIS — Z86.74 HISTORY OF CARDIAC ARREST: ICD-10-CM

## 2025-07-15 DIAGNOSIS — E78.5 IMMUNOSUPPRESSIVE-INDUCED HYPERLIPIDEMIA: ICD-10-CM

## 2025-07-15 PROCEDURE — 1036F TOBACCO NON-USER: CPT | Performed by: INTERNAL MEDICINE

## 2025-07-15 PROCEDURE — 99214 OFFICE O/P EST MOD 30 MIN: CPT | Performed by: INTERNAL MEDICINE

## 2025-07-15 ASSESSMENT — PATIENT HEALTH QUESTIONNAIRE - PHQ9
1. LITTLE INTEREST OR PLEASURE IN DOING THINGS: NOT AT ALL
SUM OF ALL RESPONSES TO PHQ9 QUESTIONS 1 AND 2: 0
2. FEELING DOWN, DEPRESSED OR HOPELESS: NOT AT ALL

## 2025-07-15 ASSESSMENT — LIFESTYLE VARIABLES
HOW MANY STANDARD DRINKS CONTAINING ALCOHOL DO YOU HAVE ON A TYPICAL DAY: PATIENT DOES NOT DRINK
HOW OFTEN DO YOU HAVE A DRINK CONTAINING ALCOHOL: NEVER
HAVE YOU OR SOMEONE ELSE BEEN INJURED AS A RESULT OF YOUR DRINKING: NO
AUDIT-C TOTAL SCORE: 0
HAS A RELATIVE, FRIEND, DOCTOR, OR ANOTHER HEALTH PROFESSIONAL EXPRESSED CONCERN ABOUT YOUR DRINKING OR SUGGESTED YOU CUT DOWN: NO
SKIP TO QUESTIONS 9-10: 1
AUDIT TOTAL SCORE: 0
HOW OFTEN DO YOU HAVE SIX OR MORE DRINKS ON ONE OCCASION: NEVER

## 2025-07-15 ASSESSMENT — ENCOUNTER SYMPTOMS
DEPRESSION: 0
OCCASIONAL FEELINGS OF UNSTEADINESS: 0
LOSS OF SENSATION IN FEET: 0

## 2025-07-15 ASSESSMENT — PAIN SCALES - GENERAL: PAINLEVEL_OUTOF10: 0-NO PAIN

## 2025-07-15 NOTE — PROGRESS NOTES
[facility] [place]    Interventional Cardiology    History of present illness:  This is a very pleasant 64-year-old female following up in my office on history of SVT. Feels much better since we started her on metoprolol and she is on the higher dose beta-blocker. Less episodes of palpitations. She underwent monitor for 2 weeks in 2023  that showed no atrial fibrillation primarily SVT, patient also had a stress test in may 2023 that showed no ischemia and normal EF. 2D echo in January 2024 within normal limit. Patient also had coronary calcium score that showed low risk overall. Total calcium score was 14. She did have some density seen on her chest CT she was seen by Dr. Frias with repeated CAT scan that showed stable disease.  Patient returns to my office for follow-up.  Has been complaining of mild productive cough for few weeks in addition of chest tightness.  Chest tightness is more epigastric associated more with cough.  Denies having nausea vomiting diaphoresis or syncope.  She has been also complaining of shortness of breath with moderate activity however she has very limited activity due to her severe rheumatoid arthritis.     Medical History[1]    Surgical History[2]    Allergies[3]     reports that she has never smoked. She has never been exposed to tobacco smoke. She has never used smokeless tobacco. She reports that she does not currently use alcohol. She reports that she does not use drugs.    Family History[4]    Patient's Medications   New Prescriptions    No medications on file   Previous Medications    ABATACEPT (ORENCIA CLICKJECT) 125 MG/ML AUTO-INJECTOR AUTO-INJECTION    Inject 1 mL (125 mg) under the skin 1 (one) time per week.    ASPIRIN-ACETAMINOPHEN-CAFFEINE (EXCEDRIN EXTRA STRENGTH) 250-250-65 MG TABLET    Take 2 tablets by mouth every 6 hours if needed.    METOPROLOL SUCCINATE XL (TOPROL-XL) 50 MG 24 HR TABLET    Take 1 tablet (50 mg) by mouth  once daily.   Modified Medications    No medications on file   Discontinued Medications    No medications on file       Objective   Physical Exam  General: Patient in no acute distress   HEENT: Atraumatic normocephalic.  Neck: Supple, jugular venous pressure within normal limit.  No bruits  Lungs: Clear to auscultation bilaterally  Cardiovascular: Regular rate and rhythm, normal heart sounds, no murmurs rubs or gallops  Abdomen: Soft nontender nondistended.  Normal bowel sounds.  Extremities: Warm to touch, no edema.      Lab Review   Office Visit on 06/17/2025   Component Date Value    WHITE BLOOD CELL COUNT 06/17/2025 5.1     RED BLOOD CELL COUNT 06/17/2025 4.48     HEMOGLOBIN 06/17/2025 10.9 (L)     HEMATOCRIT 06/17/2025 36.4     MCV 06/17/2025 81.3     MCH 06/17/2025 24.3 (L)     MCHC 06/17/2025 29.9 (L)     RDW 06/17/2025 14.4     PLATELET COUNT 06/17/2025 262     MPV 06/17/2025 11.1     GLUCOSE 06/17/2025 97     UREA NITROGEN (BUN) 06/17/2025 18     CREATININE 06/17/2025 0.60     EGFR 06/17/2025 100     SODIUM 06/17/2025 137     POTASSIUM 06/17/2025 4.2     CHLORIDE 06/17/2025 105     CARBON DIOXIDE 06/17/2025 22     ELECTROLYTE BALANCE 06/17/2025 10     CALCIUM 06/17/2025 9.3     PROTEIN, TOTAL 06/17/2025 7.2     ALBUMIN 06/17/2025 4.0     BILIRUBIN, TOTAL 06/17/2025 0.4     ALKALINE PHOSPHATASE 06/17/2025 53     AST 06/17/2025 21     ALT 06/17/2025 7     C-REACTIVE PROTEIN 06/17/2025 11.2 (H)     SED RATE BY MODIFIED SAÚL* 06/17/2025 31 (H)         Assessment/Plan   Problem List[5]     This is a very pleasant 64-year-old female following up in my office on history of SVT. Feels much better since we started her on metoprolol and she is on the higher dose beta-blocker. Less episodes of palpitations. She underwent monitor for 2 weeks in 2023  that showed no atrial fibrillation primarily SVT, patient also had a stress test in may 2023 that showed no ischemia and normal EF. 2D echo in January 2024 within normal  limit. Patient also had coronary calcium score that showed low risk overall. Total calcium score was 14. She did have some density seen on her chest CT she was seen by Dr. Frias with repeated CAT scan that showed stable disease.  Patient returns to my office for follow-up.  Has been complaining of mild productive cough for few weeks in addition of chest tightness.  Chest tightness is more epigastric associated more with cough.  Denies having nausea vomiting diaphoresis or syncope.  She has been also complaining of shortness of breath with moderate activity however she has very limited activity due to her severe rheumatoid arthritis.     Patient has been having atypical chest pain.  I will obtain coronary CT angio to rule out significant coronary disease.  We will also reassess her pulmonary status since she may have rheumatoid lungs based on previous chest CT.  I will refer patient for pulmonary evaluation meanwhile.  Patient blood pressure and heart rate well-controlled.  Will continue current dose of beta-blockers.  LDL was 25 back in January 2024 with HDL of 55 and total triglycerides of 73.  Patient otherwise stable.  If she continues to have chest pain and CT angio shows progression of coronary artery disease and I will consider cardiac catheterization.  Meanwhile we will follow-up in 6 months.      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     Intestinal obstruction due to adhesions (Multi)     s/p colectomy    Muscle weakness (generalized)     Myocardial infarction (Multi) 8/2022, 1/2024    Osteoarthritis     Osteoporosis     Pain in unspecified joint     Multiple joint pain    Rheumatoid arthritis     [place]    Syncope     Varicella 1968    Visual impairment    [2]   Past Surgical History:  Procedure Laterality Date    COLECTOMY  2022    terminal ileum/cecum  [facility]    CT GUIDED PERCUTANEOUS PERITONEAL OR RETROPERITONEAL FLUID COLLECTION DRAINAGE  01/31/2022    CT GUIDED PERCUTANEOUS PERITONEAL OR RETROPERITONEAL FLUID COLLECTION DRAINAGE [facility]    CT GUIDED PERCUTANEOUS PERITONEAL OR RETROPERITONEAL FLUID COLLECTION DRAINAGE  08/10/2022    CT GUIDED PERCUTANEOUS PERITONEAL OR RETROPERITONEAL FLUID COLLECTION DRAINAGE [facility]    FOOT SURGERY Left 1995    HAND SURGERY Right 1987    JOINT REPLACEMENT  1988?    TONSILLECTOMY      WISDOM TOOTH EXTRACTION  1/2024   [3]   Allergies  Allergen Reactions    Duloxetine GI Upset and Nausea And Vomiting   [4]   Family History  Problem Relation Name Age of Onset    Diabetes type II Mother moshe     Diabetes Mother moshe     Other (psoriatic arthritis) Father Reece     Diabetes Father Reece     Cancer Father Reece     Diabetes Brother      No Known Problems Brother      Arthritis Mother's Sister Marialuisa     Arthritis Mother's Sister Kia     Arthritis Brother Don     Diabetes Mother's Brother Eriberto    [5]   Patient Active Problem List  Diagnosis    Rheumatoid arthritis involving multiple sites with positive rheumatoid factor (Multi)    Diverticulosis    History of cardiac arrest    Anxiety    Arthralgia of ankle    Arthralgia of multiple joints    SVT (supraventricular tachycardia)    Chronic pain disorder    Chronic pain of left knee    Diplopia    Immunosuppressive-induced hyperlipidemia    Iron deficiency anemia    Muscle weakness    Polymyalgia rheumatica (Multi)    Weight loss     Abnormal computerized axial tomography of chest    History of non-ST elevation myocardial infarction (NSTEMI)    At risk for falling    History of colectomy    Osteoporosis

## 2025-07-28 DIAGNOSIS — M06.9 RHEUMATOID ARTHRITIS, INVOLVING UNSPECIFIED SITE, UNSPECIFIED WHETHER RHEUMATOID FACTOR PRESENT (MULTI): ICD-10-CM

## 2025-07-28 RX ORDER — ABATACEPT 125 MG/ML
INJECTION, SOLUTION SUBCUTANEOUS
Qty: 4 ML | Refills: 5 | Status: SHIPPED | OUTPATIENT
Start: 2025-07-28

## 2025-10-30 ENCOUNTER — APPOINTMENT (OUTPATIENT)
Dept: CARDIOLOGY | Facility: CLINIC | Age: 65
End: 2025-10-30
Payer: COMMERCIAL

## 2025-12-16 ENCOUNTER — APPOINTMENT (OUTPATIENT)
Dept: RHEUMATOLOGY | Facility: CLINIC | Age: 65
End: 2025-12-16
Payer: COMMERCIAL